# Patient Record
Sex: FEMALE | Race: WHITE | NOT HISPANIC OR LATINO | Employment: UNEMPLOYED | URBAN - NONMETROPOLITAN AREA
[De-identification: names, ages, dates, MRNs, and addresses within clinical notes are randomized per-mention and may not be internally consistent; named-entity substitution may affect disease eponyms.]

---

## 2017-01-25 ENCOUNTER — OFFICE VISIT (OUTPATIENT)
Dept: FAMILY MEDICINE CLINIC | Facility: CLINIC | Age: 52
End: 2017-01-25

## 2017-01-25 VITALS
BODY MASS INDEX: 22.82 KG/M2 | SYSTOLIC BLOOD PRESSURE: 94 MMHG | HEIGHT: 65 IN | DIASTOLIC BLOOD PRESSURE: 64 MMHG | WEIGHT: 137 LBS

## 2017-01-25 DIAGNOSIS — M54.31 SCIATICA OF RIGHT SIDE: ICD-10-CM

## 2017-01-25 DIAGNOSIS — F41.1 GENERALIZED ANXIETY DISORDER: Primary | ICD-10-CM

## 2017-01-25 PROCEDURE — 99213 OFFICE O/P EST LOW 20 MIN: CPT | Performed by: FAMILY MEDICINE

## 2017-01-25 RX ORDER — LORAZEPAM 0.5 MG/1
0.5 TABLET ORAL 2 TIMES DAILY PRN
Qty: 60 TABLET | Refills: 2 | Status: SHIPPED | OUTPATIENT
Start: 2017-01-25 | End: 2017-02-03 | Stop reason: SDUPTHER

## 2017-01-25 NOTE — PATIENT INSTRUCTIONS
Sciatica With Rehab  The sciatic nerve runs from the back down the leg and is responsible for sensation and control of the muscles in the back (posterior) side of the thigh, lower leg, and foot. Sciatica is a condition that is characterized by inflammation of this nerve.   SYMPTOMS   · Signs of nerve damage, including numbness and/or weakness along the posterior side of the lower extremity.  · Pain in the back of the thigh that may also travel down the leg.  · Pain that worsens when sitting for long periods of time.  · Occasionally, pain in the back or buttock.  CAUSES   Inflammation of the sciatic nerve is the cause of sciatica. The inflammation is due to something irritating the nerve. Common sources of irritation include:  · Sitting for long periods of time.  · Direct trauma to the nerve.  · Arthritis of the spine.  · Herniated or ruptured disk.  · Slipping of the vertebrae (spondylolisthesis).  · Pressure from soft tissues, such as muscles or ligament-like tissue (fascia).  RISK INCREASES WITH:  · Sports that place pressure or stress on the spine (football or weightlifting).  · Poor strength and flexibility.  · Failure to warm up properly before activity.  · Family history of low back pain or disk disorders.  · Previous back injury or surgery.  · Poor body mechanics, especially when lifting, or poor posture.  PREVENTION   · Warm up and stretch properly before activity.  · Maintain physical fitness:    Strength, flexibility, and endurance.  · Cardiovascular fitness.  · Learn and use proper technique, especially with posture and lifting. When possible, have  correct improper technique.  · Avoid activities that place stress on the spine.  PROGNOSIS  If treated properly, then sciatica usually resolves within 6 weeks. However, occasionally surgery is necessary.   RELATED COMPLICATIONS   · Permanent nerve damage, including pain, numbness, tingle, or weakness.  · Chronic back pain.  · Risks of surgery: infection,  bleeding, nerve damage, or damage to surrounding tissues.  TREATMENT  Treatment initially involves resting from any activities that aggravate your symptoms. The use of ice and medication may help reduce pain and inflammation. The use of strengthening and stretching exercises may help reduce pain with activity. These exercises may be performed at home or with referral to a therapist. A therapist may recommend further treatments, such as transcutaneous electronic nerve stimulation (TENS) or ultrasound. Your caregiver may recommend corticosteroid injections to help reduce inflammation of the sciatic nerve. If symptoms persist despite non-surgical (conservative) treatment, then surgery may be recommended.  MEDICATION  · If pain medication is necessary, then nonsteroidal anti-inflammatory medications, such as aspirin and ibuprofen, or other minor pain relievers, such as acetaminophen, are often recommended.  · Do not take pain medication for 7 days before surgery.  · Prescription pain relievers may be given if deemed necessary by your caregiver. Use only as directed and only as much as you need.  · Ointments applied to the skin may be helpful.  · Corticosteroid injections may be given by your caregiver. These injections should be reserved for the most serious cases, because they may only be given a certain number of times.  HEAT AND COLD  · Cold treatment (icing) relieves pain and reduces inflammation. Cold treatment should be applied for 10 to 15 minutes every 2 to 3 hours for inflammation and pain and immediately after any activity that aggravates your symptoms. Use ice packs or massage the area with a piece of ice (ice massage).  · Heat treatment may be used prior to performing the stretching and strengthening activities prescribed by your caregiver, physical therapist, or . Use a heat pack or soak the injury in warm water.  SEEK MEDICAL CARE IF:  · Treatment seems to offer no benefit, or the condition  worsens.  · Any medications produce adverse side effects.  EXERCISES   RANGE OF MOTION (ROM) AND STRETCHING EXERCISES - Sciatica  Most people with sciatic will find that their symptoms worsen with either excessive bending forward (flexion) or arching at the low back (extension). The exercises which will help resolve your symptoms will focus on the opposite motion. Your physician, physical therapist or  will help you determine which exercises will be most helpful to resolve your low back pain. Do not complete any exercises without first consulting with your clinician. Discontinue any exercises which worsen your symptoms until you speak to your clinician. If you have pain, numbness or tingling which travels down into your buttocks, leg or foot, the goal of the therapy is for these symptoms to move closer to your back and eventually resolve. Occasionally, these leg symptoms will get better, but your low back pain may worsen; this is typically an indication of progress in your rehabilitation. Be certain to be very alert to any changes in your symptoms and the activities in which you participated in the 24 hours prior to the change. Sharing this information with your clinician will allow him/her to most efficiently treat your condition.  These exercises may help you when beginning to rehabilitate your injury. Your symptoms may resolve with or without further involvement from your physician, physical therapist or . While completing these exercises, remember:   · Restoring tissue flexibility helps normal motion to return to the joints. This allows healthier, less painful movement and activity.  · An effective stretch should be held for at least 30 seconds.  · A stretch should never be painful. You should only feel a gentle lengthening or release in the stretched tissue.  FLEXION RANGE OF MOTION AND STRETCHING EXERCISES:  STRETCH - Flexion, Single Knee to Chest   · Lie on a firm bed or floor  with both legs extended in front of you.  · Keeping one leg in contact with the floor, bring your opposite knee to your chest. Hold your leg in place by either grabbing behind your thigh or at your knee.  · Pull until you feel a gentle stretch in your low back. Hold __________ seconds.  · Slowly release your grasp and repeat the exercise with the opposite side.  Repeat __________ times. Complete this exercise __________ times per day.   STRETCH - Flexion, Double Knee to Chest  · Lie on a firm bed or floor with both legs extended in front of you.  · Keeping one leg in contact with the floor, bring your opposite knee to your chest.  · Tense your stomach muscles to support your back and then lift your other knee to your chest. Hold your legs in place by either grabbing behind your thighs or at your knees.  · Pull both knees toward your chest until you feel a gentle stretch in your low back. Hold __________ seconds.  · Tense your stomach muscles and slowly return one leg at a time to the floor.  Repeat __________ times. Complete this exercise __________ times per day.   STRETCH - Low Trunk Rotation   · Lie on a firm bed or floor. Keeping your legs in front of you, bend your knees so they are both pointed toward the ceiling and your feet are flat on the floor.  · Extend your arms out to the side. This will stabilize your upper body by keeping your shoulders in contact with the floor.  · Gently and slowly drop both knees together to one side until you feel a gentle stretch in your low back. Hold for __________ seconds.  · Tense your stomach muscles to support your low back as you bring your knees back to the starting position. Repeat the exercise to the other side.  Repeat __________ times. Complete this exercise __________ times per day   EXTENSION RANGE OF MOTION AND FLEXIBILITY EXERCISES:  STRETCH - Extension, Prone on Elbows  · Lie on your stomach on the floor, a bed will be too soft. Place your palms about shoulder  "width apart and at the height of your head.  · Place your elbows under your shoulders. If this is too painful, stack pillows under your chest.  · Allow your body to relax so that your hips drop lower and make contact more completely with the floor.  · Hold this position for __________ seconds.  · Slowly return to lying flat on the floor.  Repeat __________ times. Complete this exercise __________ times per day.   RANGE OF MOTION - Extension, Prone Press Ups  · Lie on your stomach on the floor, a bed will be too soft. Place your palms about shoulder width apart and at the height of your head.  · Keeping your back as relaxed as possible, slowly straighten your elbows while keeping your hips on the floor. You may adjust the placement of your hands to maximize your comfort. As you gain motion, your hands will come more underneath your shoulders.  · Hold this position __________ seconds.  · Slowly return to lying flat on the floor.  Repeat __________ times. Complete this exercise __________ times per day.   STRENGTHENING EXERCISES - Sciatica   These exercises may help you when beginning to rehabilitate your injury. These exercises should be done near your \"sweet spot.\" This is the neutral, low-back arch, somewhere between fully rounded and fully arched, that is your least painful position. When performed in this safe range of motion, these exercises can be used for people who have either a flexion or extension based injury. These exercises may resolve your symptoms with or without further involvement from your physician, physical therapist or . While completing these exercises, remember:   · Muscles can gain both the endurance and the strength needed for everyday activities through controlled exercises.  · Complete these exercises as instructed by your physician, physical therapist or . Progress with the resistance and repetition exercises only as your caregiver advises.  · You may " experience muscle soreness or fatigue, but the pain or discomfort you are trying to eliminate should never worsen during these exercises. If this pain does worsen, stop and make certain you are following the directions exactly. If the pain is still present after adjustments, discontinue the exercise until you can discuss the trouble with your clinician.  STRENGTHENING - Deep Abdominals, Pelvic Tilt   · Lie on a firm bed or floor. Keeping your legs in front of you, bend your knees so they are both pointed toward the ceiling and your feet are flat on the floor.  · Tense your lower abdominal muscles to press your low back into the floor. This motion will rotate your pelvis so that your tail bone is scooping upwards rather than pointing at your feet or into the floor.  · With a gentle tension and even breathing, hold this position for __________ seconds.  Repeat __________ times. Complete this exercise __________ times per day.   STRENGTHENING - Abdominals, Crunches   · Lie on a firm bed or floor. Keeping your legs in front of you, bend your knees so they are both pointed toward the ceiling and your feet are flat on the floor. Cross your arms over your chest.  · Slightly tip your chin down without bending your neck.  · Tense your abdominals and slowly lift your trunk high enough to just clear your shoulder blades. Lifting higher can put excessive stress on the low back and does not further strengthen your abdominal muscles.  · Control your return to the starting position.  Repeat __________ times. Complete this exercise __________ times per day.   STRENGTHENING - Quadruped, Opposite UE/LE Lift  · Assume a hands and knees position on a firm surface. Keep your hands under your shoulders and your knees under your hips. You may place padding under your knees for comfort.  · Find your neutral spine and gently tense your abdominal muscles so that you can maintain this position. Your shoulders and hips should form a rectangle  that is parallel with the floor and is not twisted.  · Keeping your trunk steady, lift your right hand no higher than your shoulder and then your left leg no higher than your hip. Make sure you are not holding your breath. Hold this position __________ seconds.  · Continuing to keep your abdominal muscles tense and your back steady, slowly return to your starting position. Repeat with the opposite arm and leg.  Repeat __________ times. Complete this exercise __________ times per day.   STRENGTHENING - Abdominals and Quadriceps, Straight Leg Raise   · Lie on a firm bed or floor with both legs extended in front of you.  · Keeping one leg in contact with the floor, bend the other knee so that your foot can rest flat on the floor.  · Find your neutral spine, and tense your abdominal muscles to maintain your spinal position throughout the exercise.  · Slowly lift your straight leg off the floor about 6 inches for a count of 15, making sure to not hold your breath.  · Still keeping your neutral spine, slowly lower your leg all the way to the floor.  Repeat this exercise with each leg __________ times. Complete this exercise __________ times per day.  POSTURE AND BODY MECHANICS CONSIDERATIONS - Sciatica  Keeping correct posture when sitting, standing or completing your activities will reduce the stress put on different body tissues, allowing injured tissues a chance to heal and limiting painful experiences. The following are general guidelines for improved posture. Your physician or physical therapist will provide you with any instructions specific to your needs. While reading these guidelines, remember:  · The exercises prescribed by your provider will help you have the flexibility and strength to maintain correct postures.  · The correct posture provides the optimal environment for your joints to work. All of your joints have less wear and tear when properly supported by a spine with good posture. This means you will  experience a healthier, less painful body.  · Correct posture must be practiced with all of your activities, especially prolonged sitting and standing. Correct posture is as important when doing repetitive low-stress activities (typing) as it is when doing a single heavy-load activity (lifting).  RESTING POSITIONS  Consider which positions are most painful for you when choosing a resting position. If you have pain with flexion-based activities (sitting, bending, stooping, squatting), choose a position that allows you to rest in a less flexed posture. You would want to avoid curling into a fetal position on your side. If your pain worsens with extension-based activities (prolonged standing, working overhead), avoid resting in an extended position such as sleeping on your stomach. Most people will find more comfort when they rest with their spine in a more neutral position, neither too rounded nor too arched. Lying on a non-sagging bed on your side with a pillow between your knees, or on your back with a pillow under your knees will often provide some relief. Keep in mind, being in any one position for a prolonged period of time, no matter how correct your posture, can still lead to stiffness.  PROPER SITTING POSTURE  In order to minimize stress and discomfort on your spine, you must sit with correct posture Sitting with good posture should be effortless for a healthy body. Returning to good posture is a gradual process. Many people can work toward this most comfortably by using various supports until they have the flexibility and strength to maintain this posture on their own.  When sitting with proper posture, your ears will fall over your shoulders and your shoulders will fall over your hips. You should use the back of the chair to support your upper back. Your low back will be in a neutral position, just slightly arched. You may place a small pillow or folded towel at the base of your low back for support.   When  working at a desk, create an environment that supports good, upright posture. Without extra support, muscles fatigue and lead to excessive strain on joints and other tissues. Keep these recommendations in mind:  CHAIR:   · A chair should be able to slide under your desk when your back makes contact with the back of the chair. This allows you to work closely.  · The chair's height should allow your eyes to be level with the upper part of your monitor and your hands to be slightly lower than your elbows.  BODY POSITION  · Your feet should make contact with the floor. If this is not possible, use a foot rest.  · Keep your ears over your shoulders. This will reduce stress on your neck and low back.  INCORRECT SITTING POSTURES   · If you are feeling tired and unable to assume a healthy sitting posture, do not slouch or slump. This puts excessive strain on your back tissues, causing more damage and pain. Healthier options include:  · Using more support, like a lumbar pillow.  · Switching tasks to something that requires you to be upright or walking.  · Talking a brief walk.  · Lying down to rest in a neutral-spine position.  PROLONGED STANDING WHILE SLIGHTLY LEANING FORWARD   When completing a task that requires you to lean forward while standing in one place for a long time, place either foot up on a stationary 2-4 inch high object to help maintain the best posture. When both feet are on the ground, the low back tends to lose its slight inward curve. If this curve flattens (or becomes too large), then the back and your other joints will experience too much stress, fatigue more quickly and can cause pain.   CORRECT STANDING POSTURES  Proper standing posture should be assumed with all daily activities, even if they only take a few moments, like when brushing your teeth. As in sitting, your ears should fall over your shoulders and your shoulders should fall over your hips. You should keep a slight tension in your abdominal  muscles to brace your spine. Your tailbone should point down to the ground, not behind your body, resulting in an over-extended swayback posture.   INCORRECT STANDING POSTURES   Common incorrect standing postures include a forward head, locked knees and/or an excessive swayback.  WALKING  Walk with an upright posture. Your ears, shoulders and hips should all line-up.  PROLONGED ACTIVITY IN A FLEXED POSITION  When completing a task that requires you to bend forward at your waist or lean over a low surface, try to find a way to stabilize 3 of 4 of your limbs. You can place a hand or elbow on your thigh or rest a knee on the surface you are reaching across. This will provide you more stability so that your muscles do not fatigue as quickly. By keeping your knees relaxed, or slightly bent, you will also reduce stress across your low back.  CORRECT LIFTING TECHNIQUES  DO :   · Assume a wide stance. This will provide you more stability and the opportunity to get as close as possible to the object which you are lifting.  · Tense your abdominals to brace your spine; then bend at the knees and hips. Keeping your back locked in a neutral-spine position, lift using your leg muscles. Lift with your legs, keeping your back straight.  · Test the weight of unknown objects before attempting to lift them.  · Try to keep your elbows locked down at your sides in order get the best strength from your shoulders when carrying an object.  · Always ask for help when lifting heavy or awkward objects.  INCORRECT LIFTING TECHNIQUES  DO NOT:   · Lock your knees when lifting, even if it is a small object.  · Bend and twist. Pivot at your feet or move your feet when needing to change directions.  · Assume that you cannot safely  a paperclip without proper posture.     This information is not intended to replace advice given to you by your health care provider. Make sure you discuss any questions you have with your health care provider.      Document Released: 12/18/2006 Document Revised: 05/03/2016 Document Reviewed: 04/01/2010  [employer] Patient Education ©2016 [employer]

## 2017-01-25 NOTE — MR AVS SNAPSHOT
Nerissa John   1/25/2017 3:15 PM   Office Visit    Dept Phone:  [phone]   Encounter #:  [id number]    Provider:  Raiza Saini MD   Department:  [facility] FAM[facility]                Your Full Care Plan              Today's Medication Changes          These changes are accurate as of: 1/25/17  4:01 PM.  If you have any questions, ask your nurse or doctor.               Medication(s)that have changed:     FORTEO 600 MCG/2.4ML injection   Generic drug:  teriparatide   INJECT 2.4ML SUBQ EVERY DAY AS DIRECTED   What changed:  Another medication with the same name was removed. Continue taking this medication, and follow the directions you see here.   Changed by:  Bari Elizabeth MD            Where to Get Your Medications      You can get these medications from any pharmacy     Bring a paper prescription for each of these medications     LORazepam 0.5 MG tablet                  Your Updated Medication List          This list is accurate as of: 1/25/17  4:01 PM.  Always use your most recent med list.                aspirin 81 MG chewable tablet       calcium carbonate 1250 MG capsule       EASY TOUCH PEN NEEDLES 31G X 5 MM misc   Generic drug:  Insulin Pen Needle   USE AS DIRECTED.       FISH OIL CONCENTRATE 1000 MG capsule       FORTEO 600 MCG/2.4ML injection   Generic drug:  teriparatide   INJECT 2.4ML SUBQ EVERY DAY AS DIRECTED       LORazepam 0.5 MG tablet   Commonly known as:  ATIVAN   Take 1 tablet by mouth 2 (Two) Times a Day As Needed for anxiety.       methIMAzole 5 MG tablet   Commonly known as:  TAPAZOLE   Take 1 tablet by mouth daily.       MULTIVITAMIN PO       naproxen 375 MG tablet   Commonly known as:  NAPROSYN   Take 1 tablet by mouth 2 (Two) Times a Day As Needed for mild pain (1-3).       omeprazole 20 MG capsule   Commonly known as:  priLOSEC   Take 1 capsule by mouth Daily.       tiZANidine 2 MG tablet   Commonly known as:   ZANAFLEX   Take 1 tablet by mouth Every 8 (Eight) Hours As Needed for muscle spasms.       TOPROL XL 25 MG 24 hr tablet   Generic drug:  metoprolol succinate XL       vitamin E 400 UNIT capsule               You Were Diagnosed With        Codes Comments    Generalized anxiety disorder     ICD-10-CM: F41.1  ICD-9-CM: 300.02       Instructions    Sciatica With Rehab  The sciatic nerve runs from the back down the leg and is responsible for sensation and control of the muscles in the back (posterior) side of the thigh, lower leg, and foot. Sciatica is a condition that is characterized by inflammation of this nerve.   SYMPTOMS   · Signs of nerve damage, including numbness and/or weakness along the posterior side of the lower extremity.  · Pain in the back of the thigh that may also travel down the leg.  · Pain that worsens when sitting for long periods of time.  · Occasionally, pain in the back or buttock.  CAUSES   Inflammation of the sciatic nerve is the cause of sciatica. The inflammation is due to something irritating the nerve. Common sources of irritation include:  · Sitting for long periods of time.  · Direct trauma to the nerve.  · Arthritis of the spine.  · Herniated or ruptured disk.  · Slipping of the vertebrae (spondylolisthesis).  · Pressure from soft tissues, such as muscles or ligament-like tissue (fascia).  RISK INCREASES WITH:  · Sports that place pressure or stress on the spine (football or weightlifting).  · Poor strength and flexibility.  · Failure to warm up properly before activity.  · Family history of low back pain or disk disorders.  · Previous back injury or surgery.  · Poor body mechanics, especially when lifting, or poor posture.  PREVENTION   · Warm up and stretch properly before activity.  · Maintain physical fitness:    Strength, flexibility, and endurance.  · Cardiovascular fitness.  · Learn and use proper technique, especially with posture and lifting. When possible, have  correct  improper technique.  · Avoid activities that place stress on the spine.  PROGNOSIS  If treated properly, then sciatica usually resolves within 6 weeks. However, occasionally surgery is necessary.   RELATED COMPLICATIONS   · Permanent nerve damage, including pain, numbness, tingle, or weakness.  · Chronic back pain.  · Risks of surgery: infection, bleeding, nerve damage, or damage to surrounding tissues.  TREATMENT  Treatment initially involves resting from any activities that aggravate your symptoms. The use of ice and medication may help reduce pain and inflammation. The use of strengthening and stretching exercises may help reduce pain with activity. These exercises may be performed at home or with referral to a therapist. A therapist may recommend further treatments, such as transcutaneous electronic nerve stimulation (TENS) or ultrasound. Your caregiver may recommend corticosteroid injections to help reduce inflammation of the sciatic nerve. If symptoms persist despite non-surgical (conservative) treatment, then surgery may be recommended.  MEDICATION  · If pain medication is necessary, then nonsteroidal anti-inflammatory medications, such as aspirin and ibuprofen, or other minor pain relievers, such as acetaminophen, are often recommended.  · Do not take pain medication for 7 days before surgery.  · Prescription pain relievers may be given if deemed necessary by your caregiver. Use only as directed and only as much as you need.  · Ointments applied to the skin may be helpful.  · Corticosteroid injections may be given by your caregiver. These injections should be reserved for the most serious cases, because they may only be given a certain number of times.  HEAT AND COLD  · Cold treatment (icing) relieves pain and reduces inflammation. Cold treatment should be applied for 10 to 15 minutes every 2 to 3 hours for inflammation and pain and immediately after any activity that aggravates your symptoms. Use ice packs  or massage the area with a piece of ice (ice massage).  · Heat treatment may be used prior to performing the stretching and strengthening activities prescribed by your caregiver, physical therapist, or . Use a heat pack or soak the injury in warm water.  SEEK MEDICAL CARE IF:  · Treatment seems to offer no benefit, or the condition worsens.  · Any medications produce adverse side effects.  EXERCISES   RANGE OF MOTION (ROM) AND STRETCHING EXERCISES - Sciatica  Most people with sciatic will find that their symptoms worsen with either excessive bending forward (flexion) or arching at the low back (extension). The exercises which will help resolve your symptoms will focus on the opposite motion. Your physician, physical therapist or  will help you determine which exercises will be most helpful to resolve your low back pain. Do not complete any exercises without first consulting with your clinician. Discontinue any exercises which worsen your symptoms until you speak to your clinician. If you have pain, numbness or tingling which travels down into your buttocks, leg or foot, the goal of the therapy is for these symptoms to move closer to your back and eventually resolve. Occasionally, these leg symptoms will get better, but your low back pain may worsen; this is typically an indication of progress in your rehabilitation. Be certain to be very alert to any changes in your symptoms and the activities in which you participated in the 24 hours prior to the change. Sharing this information with your clinician will allow him/her to most efficiently treat your condition.  These exercises may help you when beginning to rehabilitate your injury. Your symptoms may resolve with or without further involvement from your physician, physical therapist or . While completing these exercises, remember:   · Restoring tissue flexibility helps normal motion to return to the joints. This allows  healthier, less painful movement and activity.  · An effective stretch should be held for at least 30 seconds.  · A stretch should never be painful. You should only feel a gentle lengthening or release in the stretched tissue.  FLEXION RANGE OF MOTION AND STRETCHING EXERCISES:  STRETCH - Flexion, Single Knee to Chest   · Lie on a firm bed or floor with both legs extended in front of you.  · Keeping one leg in contact with the floor, bring your opposite knee to your chest. Hold your leg in place by either grabbing behind your thigh or at your knee.  · Pull until you feel a gentle stretch in your low back. Hold __________ seconds.  · Slowly release your grasp and repeat the exercise with the opposite side.  Repeat __________ times. Complete this exercise __________ times per day.   STRETCH - Flexion, Double Knee to Chest  · Lie on a firm bed or floor with both legs extended in front of you.  · Keeping one leg in contact with the floor, bring your opposite knee to your chest.  · Tense your stomach muscles to support your back and then lift your other knee to your chest. Hold your legs in place by either grabbing behind your thighs or at your knees.  · Pull both knees toward your chest until you feel a gentle stretch in your low back. Hold __________ seconds.  · Tense your stomach muscles and slowly return one leg at a time to the floor.  Repeat __________ times. Complete this exercise __________ times per day.   STRETCH - Low Trunk Rotation   · Lie on a firm bed or floor. Keeping your legs in front of you, bend your knees so they are both pointed toward the ceiling and your feet are flat on the floor.  · Extend your arms out to the side. This will stabilize your upper body by keeping your shoulders in contact with the floor.  · Gently and slowly drop both knees together to one side until you feel a gentle stretch in your low back. Hold for __________ seconds.  · Tense your stomach muscles to support your low back as  "you bring your knees back to the starting position. Repeat the exercise to the other side.  Repeat __________ times. Complete this exercise __________ times per day   EXTENSION RANGE OF MOTION AND FLEXIBILITY EXERCISES:  STRETCH - Extension, Prone on Elbows  · Lie on your stomach on the floor, a bed will be too soft. Place your palms about shoulder width apart and at the height of your head.  · Place your elbows under your shoulders. If this is too painful, stack pillows under your chest.  · Allow your body to relax so that your hips drop lower and make contact more completely with the floor.  · Hold this position for __________ seconds.  · Slowly return to lying flat on the floor.  Repeat __________ times. Complete this exercise __________ times per day.   RANGE OF MOTION - Extension, Prone Press Ups  · Lie on your stomach on the floor, a bed will be too soft. Place your palms about shoulder width apart and at the height of your head.  · Keeping your back as relaxed as possible, slowly straighten your elbows while keeping your hips on the floor. You may adjust the placement of your hands to maximize your comfort. As you gain motion, your hands will come more underneath your shoulders.  · Hold this position __________ seconds.  · Slowly return to lying flat on the floor.  Repeat __________ times. Complete this exercise __________ times per day.   STRENGTHENING EXERCISES - Sciatica   These exercises may help you when beginning to rehabilitate your injury. These exercises should be done near your \"sweet spot.\" This is the neutral, low-back arch, somewhere between fully rounded and fully arched, that is your least painful position. When performed in this safe range of motion, these exercises can be used for people who have either a flexion or extension based injury. These exercises may resolve your symptoms with or without further involvement from your physician, physical therapist or . While completing " these exercises, remember:   · Muscles can gain both the endurance and the strength needed for everyday activities through controlled exercises.  · Complete these exercises as instructed by your physician, physical therapist or . Progress with the resistance and repetition exercises only as your caregiver advises.  · You may experience muscle soreness or fatigue, but the pain or discomfort you are trying to eliminate should never worsen during these exercises. If this pain does worsen, stop and make certain you are following the directions exactly. If the pain is still present after adjustments, discontinue the exercise until you can discuss the trouble with your clinician.  STRENGTHENING - Deep Abdominals, Pelvic Tilt   · Lie on a firm bed or floor. Keeping your legs in front of you, bend your knees so they are both pointed toward the ceiling and your feet are flat on the floor.  · Tense your lower abdominal muscles to press your low back into the floor. This motion will rotate your pelvis so that your tail bone is scooping upwards rather than pointing at your feet or into the floor.  · With a gentle tension and even breathing, hold this position for __________ seconds.  Repeat __________ times. Complete this exercise __________ times per day.   STRENGTHENING - Abdominals, Crunches   · Lie on a firm bed or floor. Keeping your legs in front of you, bend your knees so they are both pointed toward the ceiling and your feet are flat on the floor. Cross your arms over your chest.  · Slightly tip your chin down without bending your neck.  · Tense your abdominals and slowly lift your trunk high enough to just clear your shoulder blades. Lifting higher can put excessive stress on the low back and does not further strengthen your abdominal muscles.  · Control your return to the starting position.  Repeat __________ times. Complete this exercise __________ times per day.   STRENGTHENING - Quadruped, Opposite  UE/LE Lift  · Assume a hands and knees position on a firm surface. Keep your hands under your shoulders and your knees under your hips. You may place padding under your knees for comfort.  · Find your neutral spine and gently tense your abdominal muscles so that you can maintain this position. Your shoulders and hips should form a rectangle that is parallel with the floor and is not twisted.  · Keeping your trunk steady, lift your right hand no higher than your shoulder and then your left leg no higher than your hip. Make sure you are not holding your breath. Hold this position __________ seconds.  · Continuing to keep your abdominal muscles tense and your back steady, slowly return to your starting position. Repeat with the opposite arm and leg.  Repeat __________ times. Complete this exercise __________ times per day.   STRENGTHENING - Abdominals and Quadriceps, Straight Leg Raise   · Lie on a firm bed or floor with both legs extended in front of you.  · Keeping one leg in contact with the floor, bend the other knee so that your foot can rest flat on the floor.  · Find your neutral spine, and tense your abdominal muscles to maintain your spinal position throughout the exercise.  · Slowly lift your straight leg off the floor about 6 inches for a count of 15, making sure to not hold your breath.  · Still keeping your neutral spine, slowly lower your leg all the way to the floor.  Repeat this exercise with each leg __________ times. Complete this exercise __________ times per day.  POSTURE AND BODY MECHANICS CONSIDERATIONS - Sciatica  Keeping correct posture when sitting, standing or completing your activities will reduce the stress put on different body tissues, allowing injured tissues a chance to heal and limiting painful experiences. The following are general guidelines for improved posture. Your physician or physical therapist will provide you with any instructions specific to your needs. While reading these  guidelines, remember:  · The exercises prescribed by your provider will help you have the flexibility and strength to maintain correct postures.  · The correct posture provides the optimal environment for your joints to work. All of your joints have less wear and tear when properly supported by a spine with good posture. This means you will experience a healthier, less painful body.  · Correct posture must be practiced with all of your activities, especially prolonged sitting and standing. Correct posture is as important when doing repetitive low-stress activities (typing) as it is when doing a single heavy-load activity (lifting).  RESTING POSITIONS  Consider which positions are most painful for you when choosing a resting position. If you have pain with flexion-based activities (sitting, bending, stooping, squatting), choose a position that allows you to rest in a less flexed posture. You would want to avoid curling into a fetal position on your side. If your pain worsens with extension-based activities (prolonged standing, working overhead), avoid resting in an extended position such as sleeping on your stomach. Most people will find more comfort when they rest with their spine in a more neutral position, neither too rounded nor too arched. Lying on a non-sagging bed on your side with a pillow between your knees, or on your back with a pillow under your knees will often provide some relief. Keep in mind, being in any one position for a prolonged period of time, no matter how correct your posture, can still lead to stiffness.  PROPER SITTING POSTURE  In order to minimize stress and discomfort on your spine, you must sit with correct posture Sitting with good posture should be effortless for a healthy body. Returning to good posture is a gradual process. Many people can work toward this most comfortably by using various supports until they have the flexibility and strength to maintain this posture on their  own.  When sitting with proper posture, your ears will fall over your shoulders and your shoulders will fall over your hips. You should use the back of the chair to support your upper back. Your low back will be in a neutral position, just slightly arched. You may place a small pillow or folded towel at the base of your low back for support.   When working at a desk, create an environment that supports good, upright posture. Without extra support, muscles fatigue and lead to excessive strain on joints and other tissues. Keep these recommendations in mind:  CHAIR:   · A chair should be able to slide under your desk when your back makes contact with the back of the chair. This allows you to work closely.  · The chair's height should allow your eyes to be level with the upper part of your monitor and your hands to be slightly lower than your elbows.  BODY POSITION  · Your feet should make contact with the floor. If this is not possible, use a foot rest.  · Keep your ears over your shoulders. This will reduce stress on your neck and low back.  INCORRECT SITTING POSTURES   · If you are feeling tired and unable to assume a healthy sitting posture, do not slouch or slump. This puts excessive strain on your back tissues, causing more damage and pain. Healthier options include:  · Using more support, like a lumbar pillow.  · Switching tasks to something that requires you to be upright or walking.  · Talking a brief walk.  · Lying down to rest in a neutral-spine position.  PROLONGED STANDING WHILE SLIGHTLY LEANING FORWARD   When completing a task that requires you to lean forward while standing in one place for a long time, place either foot up on a stationary 2-4 inch high object to help maintain the best posture. When both feet are on the ground, the low back tends to lose its slight inward curve. If this curve flattens (or becomes too large), then the back and your other joints will experience too much stress, fatigue more  quickly and can cause pain.   CORRECT STANDING POSTURES  Proper standing posture should be assumed with all daily activities, even if they only take a few moments, like when brushing your teeth. As in sitting, your ears should fall over your shoulders and your shoulders should fall over your hips. You should keep a slight tension in your abdominal muscles to brace your spine. Your tailbone should point down to the ground, not behind your body, resulting in an over-extended swayback posture.   INCORRECT STANDING POSTURES   Common incorrect standing postures include a forward head, locked knees and/or an excessive swayback.  WALKING  Walk with an upright posture. Your ears, shoulders and hips should all line-up.  PROLONGED ACTIVITY IN A FLEXED POSITION  When completing a task that requires you to bend forward at your waist or lean over a low surface, try to find a way to stabilize 3 of 4 of your limbs. You can place a hand or elbow on your thigh or rest a knee on the surface you are reaching across. This will provide you more stability so that your muscles do not fatigue as quickly. By keeping your knees relaxed, or slightly bent, you will also reduce stress across your low back.  CORRECT LIFTING TECHNIQUES  DO :   · Assume a wide stance. This will provide you more stability and the opportunity to get as close as possible to the object which you are lifting.  · Tense your abdominals to brace your spine; then bend at the knees and hips. Keeping your back locked in a neutral-spine position, lift using your leg muscles. Lift with your legs, keeping your back straight.  · Test the weight of unknown objects before attempting to lift them.  · Try to keep your elbows locked down at your sides in order get the best strength from your shoulders when carrying an object.  · Always ask for help when lifting heavy or awkward objects.  INCORRECT LIFTING TECHNIQUES  DO NOT:   · Lock your knees when lifting, even if it is a small  object.  · Bend and twist. Pivot at your feet or move your feet when needing to change directions.  · Assume that you cannot safely  a paperclip without proper posture.     This information is not intended to replace advice given to you by your health care provider. Make sure you discuss any questions you have with your health care provider.     Document Released: 12/18/2006 Document Revised: 05/03/2016 Document Reviewed: 04/01/2010  [employer] Patient Education ©2016 [employer]         Patient Instructions History      Upcoming Appointments     Visit Type Date Time Department    OFFICE VISIT 1/25/2017  3:15 PM [facility]    OFFICE VISIT 3/23/2017  3:15 PM [facility]    OFFICE VISIT 5/26/2017  3:30 PM [facility]      MyChart Signup     Our records indicate that you have an active [employer] account.    You can view your After Visit Summary by going to [email or web address] and logging in with your [employer] username and password.  If you don't have a [employer] username and password but a parent or guardian has access to your record, the parent or guardian should login with their own [employer] username and password and access your record to view the After Visit Summary.    If you have questions, you can email [email or web address] or call [phone] to talk to our [employer] staff.  Remember, [employer] is NOT to be used for urgent needs.  For medical emergencies, dial 911.               Other Info from Your Visit           Your Appointments     Mar 23, 2017  3:15 PM CDT   Office Visit with Raiza Sanii MD   [facility] FAMILY MEDICINE (--)    [address]  [address] [place] [id number] [phone]           Arrive 15 minutes prior to appointment.            May 26, 2017  3:30 PM CDT   Office Visit with Bari Elizabeth MD   [facility] ENDOCRINOLOGY (--)    [address] [facility] [address]   [phone]           Arrive 15 minutes prior to appointment.              Allergies     No Known Allergies      Reason for Visit     Follow-up recheck for de quervains disease,generalized anxiety disorder    Leg Pain right leg pain, varicose veins       Vital Signs     Blood Pressure Height Weight Last Menstrual Period Body Mass Index Smoking Status    94/64 65\" (165.1 cm) 137 lb (62.1 kg) (LMP Unknown) 22.8 kg/m2 Never Smoker      Problems and Diagnoses Noted     Generalized anxiety disorder        "

## 2017-01-25 NOTE — PROGRESS NOTES
Elizabeth Lopez is a 51 y.o. female.     Leg Pain    There was no injury mechanism. The pain is present in the right hip. The quality of the pain is described as shooting. The pain is at a severity of 4/10. The pain is moderate. The pain has been intermittent since onset. Associated symptoms include numbness and tingling. Pertinent negatives include no inability to bear weight, loss of motion, loss of sensation or muscle weakness. She reports no foreign bodies present. She has tried acetaminophen and NSAIDs for the symptoms. The treatment provided mild relief.   Anxiety   Presents for follow-up visit. Symptoms include excessive worry, muscle tension, nervous/anxious behavior, palpitations, panic and restlessness. Patient reports no chest pain, compulsions, confusion, decreased concentration, depressed mood, dizziness, dry mouth, feeling of choking, hyperventilation, impotence, insomnia, irritability, malaise, nausea, obsessions, shortness of breath or suicidal ideas. Symptoms occur most days. The severity of symptoms is moderate and causing significant distress. The quality of sleep is fair. Nighttime awakenings: occasional.     Compliance with medications is %.         Current Outpatient Prescriptions:   •  aspirin 81 MG chewable tablet, Chew 81 mg daily., Disp: , Rfl:   •  calcium carbonate 1250 MG capsule, Take 600 mg by mouth., Disp: , Rfl:   •  EASY TOUCH PEN NEEDLES 31G X 5 MM misc, USE AS DIRECTED., Disp: 50 each, Rfl: 11  •  FORTEO 600 MCG/2.4ML injection, INJECT 2.4ML SUBQ EVERY DAY AS DIRECTED, Disp: 2.4 mL, Rfl: 3  •  LORazepam (ATIVAN) 0.5 MG tablet, Take 1 tablet by mouth 2 (Two) Times a Day As Needed for anxiety., Disp: 60 tablet, Rfl: 2  •  methimazole (TAPAZOLE) 5 MG tablet, Take 1 tablet by mouth daily., Disp: 30 tablet, Rfl: 4  •  metoprolol succinate XL (TOPROL XL) 25 MG 24 hr tablet, Take 12.5 mg by mouth daily., Disp: , Rfl:   •  Multiple Vitamins-Minerals (MULTIVITAMIN PO),  "Take 1 tablet by mouth daily., Disp: , Rfl:   •  naproxen (NAPROSYN) 375 MG tablet, Take 1 tablet by mouth 2 (Two) Times a Day As Needed for mild pain (1-3)., Disp: 30 tablet, Rfl: 1  •  Omega-3 Fatty Acids (FISH OIL CONCENTRATE) 1000 MG capsule, Take 1 capsule by mouth 2 (two) times a day., Disp: , Rfl:   •  omeprazole (PriLOSEC) 20 MG capsule, Take 1 capsule by mouth Daily., Disp: 30 capsule, Rfl: 5  •  tiZANidine (ZANAFLEX) 2 MG tablet, Take 1 tablet by mouth Every 8 (Eight) Hours As Needed for muscle spasms., Disp: 30 tablet, Rfl: 2  •  vitamin E 400 UNIT capsule, Take 400 Units by mouth daily., Disp: , Rfl:     The following portions of the patient's history were reviewed and updated as appropriate: allergies, current medications, past family history, past medical history, past social history, past surgical history and problem list.    Review of Systems   Constitutional: Negative for irritability.   Respiratory: Negative for shortness of breath.    Cardiovascular: Positive for palpitations. Negative for chest pain and leg swelling.   Gastrointestinal: Negative for abdominal distention, abdominal pain, constipation, diarrhea, nausea and vomiting.   Genitourinary: Negative for impotence.   Musculoskeletal: Positive for arthralgias, back pain and gait problem. Negative for joint swelling, myalgias, neck pain and neck stiffness.   Skin: Negative for pallor, rash and wound.   Neurological: Positive for tingling and numbness. Negative for dizziness.   Psychiatric/Behavioral: Negative for confusion, decreased concentration and suicidal ideas. The patient is nervous/anxious. The patient does not have insomnia.      Objective    Vitals:    01/25/17 1504   BP: 94/64   Weight: 137 lb (62.1 kg)   Height: 65\" (165.1 cm)       Physical Exam   Constitutional: She is oriented to person, place, and time. She appears well-developed and well-nourished. No distress.   Cardiovascular: Normal rate, regular rhythm and normal heart " sounds.    No murmur heard.  Pulmonary/Chest: Effort normal and breath sounds normal. No respiratory distress. She has no wheezes.   Abdominal: Soft. Bowel sounds are normal. She exhibits no distension. There is no tenderness.   Musculoskeletal:        Right hip: She exhibits normal range of motion, normal strength, no bony tenderness, no swelling and no deformity.        Lumbar back: She exhibits normal range of motion, no tenderness, no pain and no spasm.   Negative straight leg test BL.  Also normal strength in legs BL.   Neurological: She is alert and oriented to person, place, and time.   Psychiatric: She has a normal mood and affect. Her behavior is normal. Judgment and thought content normal.   Nursing note and vitals reviewed.      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  Will continue ativan for now.  UDS at next appointment.  The patient has read and signed the [facility] Controlled Substance Contract.  I will continue to see patient for regular follow up appointments.  They are well controlled on their medication.  BRENDAN has been reviewed by me and is updated every 3 months. The patient is aware of the potential for addiction and dependence.  2.) Sciatica-  Will try stretches.  Isn't flaired at the moment.    RTC in 2 months or sooner PRN

## 2017-02-01 RX ORDER — METHIMAZOLE 5 MG/1
TABLET ORAL
Qty: 30 TABLET | Refills: 0 | Status: SHIPPED | OUTPATIENT
Start: 2017-02-01 | End: 2017-05-26 | Stop reason: SDUPTHER

## 2017-02-03 DIAGNOSIS — F41.1 GENERALIZED ANXIETY DISORDER: ICD-10-CM

## 2017-02-03 RX ORDER — LORAZEPAM 0.5 MG/1
0.5 TABLET ORAL 2 TIMES DAILY PRN
Qty: 60 TABLET | Refills: 2 | Status: SHIPPED | OUTPATIENT
Start: 2017-02-03 | End: 2017-05-05 | Stop reason: SDUPTHER

## 2017-02-22 ENCOUNTER — HOSPITAL ENCOUNTER (EMERGENCY)
Facility: HOSPITAL | Age: 52
Discharge: HOME | End: 2017-02-22
Attending: EMERGENCY MEDICINE | Admitting: NURSE PRACTITIONER

## 2017-02-22 ENCOUNTER — APPOINTMENT (OUTPATIENT)
Dept: GENERAL RADIOLOGY | Facility: HOSPITAL | Age: 52
End: 2017-02-22

## 2017-02-22 VITALS
HEART RATE: 80 BPM | BODY MASS INDEX: 22.16 KG/M2 | SYSTOLIC BLOOD PRESSURE: 115 MMHG | WEIGHT: 133 LBS | TEMPERATURE: 99.1 F | OXYGEN SATURATION: 97 % | HEIGHT: 65 IN | DIASTOLIC BLOOD PRESSURE: 67 MMHG | RESPIRATION RATE: 18 BRPM

## 2017-02-22 DIAGNOSIS — F41.9 ANXIETY: Primary | ICD-10-CM

## 2017-02-22 LAB
ALBUMIN SERPL-MCNC: 4.1 G/DL (ref 3.4–4.8)
ALBUMIN/GLOB SERPL: 1.2 G/DL (ref 1.1–1.8)
ALP SERPL-CCNC: 58 U/L (ref 38–126)
ALT SERPL W P-5'-P-CCNC: 26 U/L (ref 9–52)
ANION GAP SERPL CALCULATED.3IONS-SCNC: 11 MMOL/L (ref 5–15)
AST SERPL-CCNC: 32 U/L (ref 14–36)
BASOPHILS # BLD AUTO: 0.05 10*3/MM3 (ref 0–0.2)
BASOPHILS NFR BLD AUTO: 0.8 % (ref 0–2)
BILIRUB SERPL-MCNC: 0.4 MG/DL (ref 0.2–1.3)
BUN BLD-MCNC: 15 MG/DL (ref 7–21)
BUN/CREAT SERPL: 25.9 (ref 7–25)
CALCIUM SPEC-SCNC: 9.1 MG/DL (ref 8.4–10.2)
CHLORIDE SERPL-SCNC: 98 MMOL/L (ref 95–110)
CO2 SERPL-SCNC: 25 MMOL/L (ref 22–31)
CREAT BLD-MCNC: 0.58 MG/DL (ref 0.5–1)
DEPRECATED RDW RBC AUTO: 38 FL (ref 36.4–46.3)
EOSINOPHIL # BLD AUTO: 0.15 10*3/MM3 (ref 0–0.7)
EOSINOPHIL NFR BLD AUTO: 2.4 % (ref 0–7)
ERYTHROCYTE [DISTWIDTH] IN BLOOD BY AUTOMATED COUNT: 11.9 % (ref 11.5–14.5)
GFR SERPL CREATININE-BSD FRML MDRD: 110 ML/MIN/1.73 (ref 51–120)
GLOBULIN UR ELPH-MCNC: 3.3 GM/DL (ref 2.3–3.5)
GLUCOSE BLD-MCNC: 116 MG/DL (ref 60–100)
HCT VFR BLD AUTO: 36.6 % (ref 35–45)
HGB BLD-MCNC: 13.1 G/DL (ref 12–15.5)
HOLD SPECIMEN: NORMAL
HOLD SPECIMEN: NORMAL
IMM GRANULOCYTES # BLD: 0.01 10*3/MM3 (ref 0–0.02)
IMM GRANULOCYTES NFR BLD: 0.2 % (ref 0–0.5)
LYMPHOCYTES # BLD AUTO: 1.88 10*3/MM3 (ref 0.6–4.2)
LYMPHOCYTES NFR BLD AUTO: 30.1 % (ref 10–50)
MCH RBC QN AUTO: 31.4 PG (ref 26.5–34)
MCHC RBC AUTO-ENTMCNC: 35.8 G/DL (ref 31.4–36)
MCV RBC AUTO: 87.8 FL (ref 80–98)
MONOCYTES # BLD AUTO: 0.34 10*3/MM3 (ref 0–0.9)
MONOCYTES NFR BLD AUTO: 5.4 % (ref 0–12)
NEUTROPHILS # BLD AUTO: 3.82 10*3/MM3 (ref 2–8.6)
NEUTROPHILS NFR BLD AUTO: 61.1 % (ref 37–80)
NT-PROBNP SERPL-MCNC: 106 PG/ML (ref 0–900)
PLATELET # BLD AUTO: 279 10*3/MM3 (ref 150–450)
PMV BLD AUTO: 9.2 FL (ref 8–12)
POTASSIUM BLD-SCNC: 3.8 MMOL/L (ref 3.5–5.1)
PROT SERPL-MCNC: 7.4 G/DL (ref 6.3–8.6)
RBC # BLD AUTO: 4.17 10*6/MM3 (ref 3.77–5.16)
SODIUM BLD-SCNC: 134 MMOL/L (ref 137–145)
T4 FREE SERPL-MCNC: 1 NG/DL (ref 0.78–2.19)
TROPONIN I SERPL-MCNC: <0.012 NG/ML
TROPONIN I SERPL-MCNC: <0.012 NG/ML
TSH SERPL DL<=0.05 MIU/L-ACNC: 1.58 MIU/ML (ref 0.46–4.68)
WBC NRBC COR # BLD: 6.25 10*3/MM3 (ref 3.2–9.8)
WHOLE BLOOD HOLD SPECIMEN: NORMAL
WHOLE BLOOD HOLD SPECIMEN: NORMAL

## 2017-02-22 PROCEDURE — 80050 GENERAL HEALTH PANEL: CPT | Performed by: EMERGENCY MEDICINE

## 2017-02-22 PROCEDURE — 83880 ASSAY OF NATRIURETIC PEPTIDE: CPT | Performed by: EMERGENCY MEDICINE

## 2017-02-22 PROCEDURE — 71010 HC CHEST PA OR AP: CPT

## 2017-02-22 PROCEDURE — 93005 ELECTROCARDIOGRAM TRACING: CPT

## 2017-02-22 PROCEDURE — 84439 ASSAY OF FREE THYROXINE: CPT | Performed by: NURSE PRACTITIONER

## 2017-02-22 PROCEDURE — 93010 ELECTROCARDIOGRAM REPORT: CPT | Performed by: INTERNAL MEDICINE

## 2017-02-22 PROCEDURE — 36415 COLL VENOUS BLD VENIPUNCTURE: CPT

## 2017-02-22 PROCEDURE — 84484 ASSAY OF TROPONIN QUANT: CPT | Performed by: EMERGENCY MEDICINE

## 2017-02-22 PROCEDURE — 99284 EMERGENCY DEPT VISIT MOD MDM: CPT

## 2017-02-22 RX ORDER — SODIUM CHLORIDE 0.9 % (FLUSH) 0.9 %
10 SYRINGE (ML) INJECTION AS NEEDED
Status: DISCONTINUED | OUTPATIENT
Start: 2017-02-22 | End: 2017-02-22 | Stop reason: HOSPADM

## 2017-02-22 RX ORDER — ASPIRIN 325 MG
325 TABLET ORAL ONCE
Status: DISCONTINUED | OUTPATIENT
Start: 2017-02-22 | End: 2017-02-22 | Stop reason: HOSPADM

## 2017-02-22 NOTE — ED PROVIDER NOTES
Subjective   HPI Comments: States she was sitting in her recliner, eating chocolate bar, felt she almost choked on almond. She has panic attacks and had a bad panic attack. She checked her oxygen level with pulse ox at home and it was reading low, scared her even more, so she called ambulance. Denies feeling sob at this time. She did take ativan.  Had heart cath last year, states it was clear.      History provided by:  Patient      Review of Systems   Constitutional: Negative.    Eyes: Negative.    Respiratory: Negative.    Cardiovascular: Negative.    Gastrointestinal: Negative.    Genitourinary: Negative.    Musculoskeletal: Negative.    Skin: Negative.    Neurological: Negative.    Psychiatric/Behavioral: Negative.        Past Medical History   Diagnosis Date   • Anal fistula    • Anemia    • Astigmatism    • Death of family member      Mother passes away.   • Generalized anxiety disorder    • GERD (gastroesophageal reflux disease)    • Goiter    • Graves disease    • Heartburn    • Hematuria    • Hyperthyroidism    • Lesion of eyelid      LLL   • Lumbosacral spondylosis    • Menopausal syndrome    • Myopia    • Osteoporosis    • Pain in back      Lumbar region   • Pain in joint involving left lower leg    • Pain in wrist    • Panic attack    • Presbyopia    • Right upper quadrant pain    • Takotsubo cardiomyopathy    • Thoracic spondylosis        No Known Allergies    Past Surgical History   Procedure Laterality Date   • Skin biopsy  06/29/2014   • Cardiac catheterization  03/02/2016     Normal coronaries with no obstructive disease. Nonischemic stress induced cardiomyopathy known as Takotsubo syndrome.   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   • Wound closure  01/16/2014     Layer closure of wound   • Colonoscopy  01/30/2016     Removal of anal fistula   • Hysterectomy  04/14/2014     Total abdominal hysterectomy, bilateral salpingo-oophorectomy.  Menorrhagia and leiomyomatous uterus.   • Tubal abdominal ligation         Family History   Problem Relation Age of Onset   • Heart disease Mother    • Cancer Father    • Diabetes Sister    • Thyroid disease Sister    • Diabetes Brother    • Cancer Maternal Grandmother      Ovarian Cancer   • Breast cancer Other    • Cancer Other    • Other Other      Gall Bladder disease   • Breast cancer Other        Social History     Social History   • Marital status:      Spouse name: N/A   • Number of children: N/A   • Years of education: N/A     Social History Main Topics   • Smoking status: Never Smoker   • Smokeless tobacco: None   • Alcohol use No   • Drug use: None   • Sexual activity: Not Asked     Other Topics Concern   • None     Social History Narrative           Objective   Physical Exam    ECG 12 Lead    Date/Time: 2/22/2017 2:34 PM  Performed by: JOJO ESTEVEZ  Authorized by: YANIQUE MENDEZ   Interpreted by physician  Comparison: not compared with previous ECG   Rhythm: sinus rhythm  BPM: 92                 ED Course  ED Course      XR Chest 1 View   Final Result   CONCLUSION: No acute cardiopulmonary pathology is identified.      Electronically signed by:  Malick Moura MD  2/22/2017 3:03   PM CST Workstation: [employer]        Results for orders placed or performed during the hospital encounter of 02/22/17   Troponin   Result Value Ref Range    Troponin I <0.012 <=0.034 ng/mL   Troponin   Result Value Ref Range    Troponin I <0.012 <=0.034 ng/mL   Comprehensive Metabolic Panel   Result Value Ref Range    Glucose 116 (H) 60 - 100 mg/dL    BUN 15 7 - 21 mg/dL    Creatinine 0.58 0.50 - 1.00 mg/dL    Sodium 134 (L) 137 - 145 mmol/L    Potassium 3.8 3.5 - 5.1 mmol/L    Chloride 98 95 - 110 mmol/L    CO2 25.0 22.0 - 31.0 mmol/L    Calcium 9.1 8.4 - 10.2 mg/dL    Total Protein 7.4 6.3 - 8.6 g/dL    Albumin 4.10 3.40 - 4.80 g/dL    ALT (SGPT) 26 9 - 52 U/L    AST (SGOT) 32 14 - 36 U/L    Alkaline Phosphatase  58 38 - 126 U/L    Total Bilirubin 0.4 0.2 - 1.3 mg/dL    eGFR Non  Amer 110 51 - 120 mL/min/1.73    Globulin 3.3 2.3 - 3.5 gm/dL    A/G Ratio 1.2 1.1 - 1.8 g/dL    BUN/Creatinine Ratio 25.9 (H) 7.0 - 25.0    Anion Gap 11.0 5.0 - 15.0 mmol/L   BNP   Result Value Ref Range    proBNP 106.0 0.0 - 900.0 pg/mL   CBC Auto Differential   Result Value Ref Range    WBC 6.25 3.20 - 9.80 10*3/mm3    RBC 4.17 3.77 - 5.16 10*6/mm3    Hemoglobin 13.1 12.0 - 15.5 g/dL    Hematocrit 36.6 35.0 - 45.0 %    MCV 87.8 80.0 - 98.0 fL    MCH 31.4 26.5 - 34.0 pg    MCHC 35.8 31.4 - 36.0 g/dL    RDW 11.9 11.5 - 14.5 %    RDW-SD 38.0 36.4 - 46.3 fl    MPV 9.2 8.0 - 12.0 fL    Platelets 279 150 - 450 10*3/mm3    Neutrophil % 61.1 37.0 - 80.0 %    Lymphocyte % 30.1 10.0 - 50.0 %    Monocyte % 5.4 0.0 - 12.0 %    Eosinophil % 2.4 0.0 - 7.0 %    Basophil % 0.8 0.0 - 2.0 %    Immature Grans % 0.2 0.0 - 0.5 %    Neutrophils, Absolute 3.82 2.00 - 8.60 10*3/mm3    Lymphocytes, Absolute 1.88 0.60 - 4.20 10*3/mm3    Monocytes, Absolute 0.34 0.00 - 0.90 10*3/mm3    Eosinophils, Absolute 0.15 0.00 - 0.70 10*3/mm3    Basophils, Absolute 0.05 0.00 - 0.20 10*3/mm3    Immature Grans, Absolute 0.01 0.00 - 0.02 10*3/mm3   TSH   Result Value Ref Range    TSH 1.580 0.460 - 4.680 mIU/mL   T4, Free   Result Value Ref Range    Free T4 1.00 0.78 - 2.19 ng/dL   Light Blue Top   Result Value Ref Range    Extra Tube hold for add-on    Green Top (Gel)   Result Value Ref Range    Extra Tube Hold for add-ons.    Lavender Top   Result Value Ref Range    Extra Tube hold for add-on    Gold Top - SST   Result Value Ref Range    Extra Tube Hold for add-ons.                    MDM  Number of Diagnoses or Management Options  Anxiety:   Diagnosis management comments: Labs negative, CXR neg. She has ativan at home, will use as needed and follow up with Dr Saini.      Final diagnoses:   Anxiety            Rachel Dobbs, APRN  02/22/17 2053

## 2017-04-19 RX ORDER — OMEPRAZOLE 20 MG/1
20 CAPSULE, DELAYED RELEASE ORAL DAILY
Qty: 30 CAPSULE | Refills: 5 | Status: SHIPPED | OUTPATIENT
Start: 2017-04-19 | End: 2017-09-21 | Stop reason: SDUPTHER

## 2017-05-05 ENCOUNTER — OFFICE VISIT (OUTPATIENT)
Dept: FAMILY MEDICINE CLINIC | Facility: CLINIC | Age: 52
End: 2017-05-05

## 2017-05-05 VITALS
HEIGHT: 65 IN | WEIGHT: 138 LBS | DIASTOLIC BLOOD PRESSURE: 78 MMHG | BODY MASS INDEX: 22.99 KG/M2 | SYSTOLIC BLOOD PRESSURE: 118 MMHG

## 2017-05-05 DIAGNOSIS — M54.32 SCIATICA OF LEFT SIDE: Primary | ICD-10-CM

## 2017-05-05 DIAGNOSIS — Z12.31 ENCOUNTER FOR SCREENING MAMMOGRAM FOR BREAST CANCER: ICD-10-CM

## 2017-05-05 DIAGNOSIS — F41.1 GENERALIZED ANXIETY DISORDER: ICD-10-CM

## 2017-05-05 PROCEDURE — 96372 THER/PROPH/DIAG INJ SC/IM: CPT | Performed by: FAMILY MEDICINE

## 2017-05-05 PROCEDURE — 99214 OFFICE O/P EST MOD 30 MIN: CPT | Performed by: FAMILY MEDICINE

## 2017-05-05 RX ORDER — TRIAMCINOLONE ACETONIDE 40 MG/ML
80 INJECTION, SUSPENSION INTRA-ARTICULAR; INTRAMUSCULAR ONCE
Status: COMPLETED | OUTPATIENT
Start: 2017-05-05 | End: 2017-05-05

## 2017-05-05 RX ORDER — HYDROCODONE BITARTRATE AND ACETAMINOPHEN 5; 325 MG/1; MG/1
1 TABLET ORAL EVERY 6 HOURS PRN
Qty: 40 TABLET | Refills: 0 | Status: SHIPPED | OUTPATIENT
Start: 2017-05-05 | End: 2017-06-01 | Stop reason: SDUPTHER

## 2017-05-05 RX ORDER — LORAZEPAM 0.5 MG/1
0.5 TABLET ORAL 2 TIMES DAILY PRN
Qty: 60 TABLET | Refills: 2 | Status: SHIPPED | OUTPATIENT
Start: 2017-05-05 | End: 2017-06-30 | Stop reason: SDUPTHER

## 2017-05-05 RX ADMIN — TRIAMCINOLONE ACETONIDE 80 MG: 40 INJECTION, SUSPENSION INTRA-ARTICULAR; INTRAMUSCULAR at 11:44

## 2017-05-08 ENCOUNTER — TELEPHONE (OUTPATIENT)
Dept: FAMILY MEDICINE CLINIC | Facility: CLINIC | Age: 52
End: 2017-05-08

## 2017-05-11 ENCOUNTER — TELEPHONE (OUTPATIENT)
Dept: FAMILY MEDICINE CLINIC | Facility: CLINIC | Age: 52
End: 2017-05-11

## 2017-05-11 RX ORDER — ESCITALOPRAM OXALATE 10 MG/1
10 TABLET ORAL DAILY
Qty: 30 TABLET | Refills: 2 | Status: SHIPPED | OUTPATIENT
Start: 2017-05-11 | End: 2017-07-27 | Stop reason: DRUGHIGH

## 2017-05-18 ENCOUNTER — APPOINTMENT (OUTPATIENT)
Dept: LAB | Facility: HOSPITAL | Age: 52
End: 2017-05-18

## 2017-05-18 PROCEDURE — 80061 LIPID PANEL: CPT | Performed by: INTERNAL MEDICINE

## 2017-05-18 PROCEDURE — 80050 GENERAL HEALTH PANEL: CPT | Performed by: INTERNAL MEDICINE

## 2017-05-18 PROCEDURE — 36415 COLL VENOUS BLD VENIPUNCTURE: CPT | Performed by: INTERNAL MEDICINE

## 2017-05-18 PROCEDURE — 82607 VITAMIN B-12: CPT | Performed by: INTERNAL MEDICINE

## 2017-05-18 PROCEDURE — 82306 VITAMIN D 25 HYDROXY: CPT | Performed by: INTERNAL MEDICINE

## 2017-05-23 ENCOUNTER — HOSPITAL ENCOUNTER (OUTPATIENT)
Dept: PHYSICAL THERAPY | Facility: HOSPITAL | Age: 52
Setting detail: THERAPIES SERIES
Discharge: HOME | End: 2017-05-23

## 2017-05-23 DIAGNOSIS — M54.32 SCIATICA OF LEFT SIDE: Primary | ICD-10-CM

## 2017-05-23 PROCEDURE — 87109 MYCOPLASMA: CPT | Performed by: NURSE PRACTITIONER

## 2017-05-23 PROCEDURE — 87086 URINE CULTURE/COLONY COUNT: CPT | Performed by: NURSE PRACTITIONER

## 2017-05-23 PROCEDURE — 97162 PT EVAL MOD COMPLEX 30 MIN: CPT

## 2017-05-23 PROCEDURE — 97110 THERAPEUTIC EXERCISES: CPT

## 2017-05-26 ENCOUNTER — OFFICE VISIT (OUTPATIENT)
Dept: ENDOCRINOLOGY | Facility: CLINIC | Age: 52
End: 2017-05-26

## 2017-05-26 VITALS
HEART RATE: 77 BPM | WEIGHT: 138.4 LBS | DIASTOLIC BLOOD PRESSURE: 70 MMHG | BODY MASS INDEX: 23.06 KG/M2 | SYSTOLIC BLOOD PRESSURE: 116 MMHG | HEIGHT: 65 IN

## 2017-05-26 DIAGNOSIS — M81.0 OSTEOPOROSIS: ICD-10-CM

## 2017-05-26 DIAGNOSIS — E05.90 HYPERTHYROIDISM: Primary | ICD-10-CM

## 2017-05-26 DIAGNOSIS — E55.9 VITAMIN D DEFICIENCY: ICD-10-CM

## 2017-05-26 PROCEDURE — 99214 OFFICE O/P EST MOD 30 MIN: CPT | Performed by: INTERNAL MEDICINE

## 2017-05-26 RX ORDER — METHIMAZOLE 5 MG/1
TABLET ORAL
Qty: 30 TABLET | Refills: 11 | Status: SHIPPED | OUTPATIENT
Start: 2017-05-26 | End: 2018-05-18 | Stop reason: SDUPTHER

## 2017-06-01 ENCOUNTER — HOSPITAL ENCOUNTER (OUTPATIENT)
Dept: PHYSICAL THERAPY | Facility: HOSPITAL | Age: 52
Setting detail: THERAPIES SERIES
Discharge: HOME | End: 2017-06-01

## 2017-06-01 ENCOUNTER — OFFICE VISIT (OUTPATIENT)
Dept: FAMILY MEDICINE CLINIC | Facility: CLINIC | Age: 52
End: 2017-06-01

## 2017-06-01 VITALS
SYSTOLIC BLOOD PRESSURE: 102 MMHG | BODY MASS INDEX: 22.76 KG/M2 | DIASTOLIC BLOOD PRESSURE: 68 MMHG | HEIGHT: 65 IN | WEIGHT: 136.6 LBS

## 2017-06-01 DIAGNOSIS — M54.32 SCIATICA OF LEFT SIDE: Primary | ICD-10-CM

## 2017-06-01 DIAGNOSIS — M54.40 BILATERAL LOW BACK PAIN WITH SCIATICA, SCIATICA LATERALITY UNSPECIFIED, UNSPECIFIED CHRONICITY: Primary | Chronic | ICD-10-CM

## 2017-06-01 DIAGNOSIS — F41.1 GENERALIZED ANXIETY DISORDER: Chronic | ICD-10-CM

## 2017-06-01 PROCEDURE — 97110 THERAPEUTIC EXERCISES: CPT

## 2017-06-01 PROCEDURE — 99213 OFFICE O/P EST LOW 20 MIN: CPT | Performed by: FAMILY MEDICINE

## 2017-06-01 PROCEDURE — G0283 ELEC STIM OTHER THAN WOUND: HCPCS

## 2017-06-01 RX ORDER — HYDROCODONE BITARTRATE AND ACETAMINOPHEN 5; 325 MG/1; MG/1
1 TABLET ORAL EVERY 6 HOURS PRN
Qty: 40 TABLET | Refills: 0 | Status: SHIPPED | OUTPATIENT
Start: 2017-06-01 | End: 2017-06-30 | Stop reason: SDUPTHER

## 2017-06-01 NOTE — PROGRESS NOTES
Elizabeth Lopez is a 51 y.o. female.     Back Pain   This is a recurrent problem. The current episode started more than 1 month ago. The problem occurs constantly. Associated symptoms include headaches. Pertinent negatives include no abdominal pain or chest pain. She has tried analgesics, NSAIDs and muscle relaxant (She has just started PT) for the symptoms.   Anxiety   Presents for follow-up visit. Symptoms include excessive worry and nervous/anxious behavior. Patient reports no chest pain, compulsions, confusion, decreased concentration, depressed mood, dizziness, dry mouth, feeling of choking, hyperventilation, impotence, insomnia, irritability, malaise, muscle tension, nausea, obsessions, palpitations, panic, restlessness or shortness of breath. Symptoms occur occasionally. The severity of symptoms is moderate. The quality of sleep is fair. Nighttime awakenings: occasional.     Compliance with medications is %. Treatment side effects: She has been doing better on lexapro.        Current Outpatient Prescriptions:   •  aspirin 81 MG chewable tablet, Chew 81 mg daily., Disp: , Rfl:   •  calcium carbonate 1250 MG capsule, Take 600 mg by mouth., Disp: , Rfl:   •  EASY TOUCH PEN NEEDLES 31G X 5 MM misc, USE AS DIRECTED., Disp: 50 each, Rfl: 11  •  escitalopram (LEXAPRO) 10 MG tablet, Take 1 tablet by mouth Daily., Disp: 30 tablet, Rfl: 2  •  FORTEO 600 MCG/2.4ML injection, INJECT 2.4ML SUBQ EVERY DAY AS DIRECTED, Disp: 2.4 mL, Rfl: 3  •  HYDROcodone-acetaminophen (NORCO) 5-325 MG per tablet, Take 1 tablet by mouth Every 6 (Six) Hours As Needed for Moderate Pain (4-6)., Disp: 40 tablet, Rfl: 0  •  LORazepam (ATIVAN) 0.5 MG tablet, Take 1 tablet by mouth 2 (Two) Times a Day As Needed for Anxiety., Disp: 60 tablet, Rfl: 2  •  methIMAzole (TAPAZOLE) 5 MG tablet, One tab po daily, Disp: 30 tablet, Rfl: 11  •  metoprolol succinate XL (TOPROL XL) 25 MG 24 hr tablet, Take 12.5 mg by mouth daily., Disp: , Rfl:  "  •  Multiple Vitamins-Minerals (MULTIVITAMIN PO), Take 1 tablet by mouth daily., Disp: , Rfl:   •  naproxen (NAPROSYN) 375 MG tablet, Take 1 tablet by mouth 2 (Two) Times a Day As Needed for mild pain (1-3)., Disp: 30 tablet, Rfl: 1  •  Omega-3 Fatty Acids (FISH OIL CONCENTRATE) 1000 MG capsule, Take 1 capsule by mouth 2 (two) times a day., Disp: , Rfl:   •  omeprazole (priLOSEC) 20 MG capsule, Take 1 capsule by mouth Daily., Disp: 30 capsule, Rfl: 5  •  tiZANidine (ZANAFLEX) 2 MG tablet, Take 1 tablet by mouth Every 8 (Eight) Hours As Needed for muscle spasms., Disp: 30 tablet, Rfl: 2  •  vitamin E 400 UNIT capsule, Take 400 Units by mouth daily., Disp: , Rfl:     The following portions of the patient's history were reviewed and updated as appropriate: allergies, current medications, past family history, past medical history, past social history, past surgical history and problem list.    Review of Systems   Constitutional: Positive for activity change, appetite change and fatigue. Negative for irritability and unexpected weight change.   Eyes: Negative for visual disturbance.   Respiratory: Positive for chest tightness. Negative for cough, shortness of breath and wheezing.    Cardiovascular: Negative for chest pain, palpitations and leg swelling.   Gastrointestinal: Negative for abdominal distention, abdominal pain, constipation, diarrhea, nausea and vomiting.   Genitourinary: Negative for impotence.   Musculoskeletal: Positive for arthralgias, back pain and gait problem.   Neurological: Positive for headaches. Negative for dizziness.   Psychiatric/Behavioral: Positive for agitation, dysphoric mood and sleep disturbance. Negative for confusion and decreased concentration. The patient is nervous/anxious. The patient does not have insomnia.        Objective    Vitals:    06/01/17 0843   BP: 102/68   Weight: 136 lb 9.6 oz (62 kg)   Height: 65\" (165.1 cm)       Physical Exam   Constitutional: She is oriented to " person, place, and time. She appears well-developed and well-nourished. No distress.   Cardiovascular: Normal rate, regular rhythm and normal heart sounds.    No murmur heard.  No LE edema.   Pulmonary/Chest: Effort normal and breath sounds normal. No respiratory distress.   Abdominal: Soft. Bowel sounds are normal. She exhibits no distension. There is no tenderness.   Musculoskeletal:        Cervical back: She exhibits tenderness and spasm. She exhibits normal range of motion, no bony tenderness, no deformity and no laceration.        Lumbar back: She exhibits decreased range of motion, tenderness, pain and spasm. She exhibits no deformity.   Neurological: She is alert and oriented to person, place, and time.   Psychiatric: She has a normal mood and affect. Her behavior is normal. Judgment and thought content normal.   Nursing note and vitals reviewed.      Assessment/Plan   Problems Addressed this Visit        Nervous and Auditory    Lumbar back pain - Primary       Other    Generalized anxiety disorder        1.) Back Pain-  Will continue her PT and has been getting better. Refilled another short course of pain medicaiton to use only PRN.    2.) Anxiety- Lexapro seems to be working well for her and helping with the depression aspect. Continue that and the ativan for now.  RTC in 1-2 months or sooner PRN

## 2017-06-01 NOTE — THERAPY TREATMENT NOTE
Outpatient Physical Therapy Ortho Treatment Note  [facility]     Patient Name: Nerissa Lopez  : 1965  MRN: [id number]  Today's Date: 2017      Visit Date: 2017  Visits 2/2. Marc 17. MD f/charlee MENDIETA.   Visit Dx:    ICD-10-CM ICD-9-CM   1. Sciatica of left side M54.32 724.3       Patient Active Problem List   Diagnosis   • Hyperthyroidism   • Osteoporosis   • Vitamin D deficiency   • Menopausal syndrome (hot flashes)   • Anal fistula   • Anemia   • Astigmatism   • Death of family member   • Gastroesophageal reflux disease   • Generalized anxiety disorder   • Goiter   • Graves disease   • Hematuria   • Lesion of eyelid   • Lumbosacral spondylolysis   • Menopausal syndrome   • Myopia   • Lumbar back pain   • Panic attack   • Presbyopia   • Takotsubo cardiomyopathy   • Thoracic spondylosis        Past Medical History:   Diagnosis Date   • Anal fistula    • Anemia    • Astigmatism    • Broken heart syndrome 2016    when mother passed away   • Death of family member     Mother passes away.   • Generalized anxiety disorder    • GERD (gastroesophageal reflux disease)    • Goiter    • Graves disease    • Heartburn    • Hematuria    • Hyperthyroidism    • Lesion of eyelid     LLL   • Lumbosacral spondylosis    • Menopausal syndrome    • Myopia    • Osteoporosis    • Pain in back     Lumbar region   • Pain in joint involving left lower leg    • Pain in wrist    • Panic attack    • Presbyopia    • Right upper quadrant pain    • Takotsubo cardiomyopathy    • Thoracic spondylosis         Past Surgical History:   Procedure Laterality Date   • CARDIAC CATHETERIZATION  2016    Normal coronaries with no obstructive disease. Nonischemic stress induced cardiomyopathy known as Takotsubo syndrome.   • COLONOSCOPY  2016    Removal of anal fistula   • CYST REMOVAL Right     right breast   • EYELID CARCINOMA EXCISION  06/15/2015   • HYSTERECTOMY  2014    Total abdominal hysterectomy,  bilateral salpingo-oophorectomy. Menorrhagia and leiomyomatous uterus.   • SKIN BIOPSY  06/29/2014   • TUBAL ABDOMINAL LIGATION     • WOUND CLOSURE  07/20/2014    Layer closure of wound.   • WOUND CLOSURE  01/16/2014    Layer closure of wound             PT Ortho       06/01/17 1100    Subjective Comments    Subjective Comments Pt c/o LBP today that sometimes goes into R hip.   -    Precautions and Contraindications    Precautions/Limitations no known precautions/limitations  -    Subjective Pain    Able to rate subjective pain? yes  -    Pre-Treatment Pain Level 5  -    Post-Treatment Pain Level 3  -      User Key  (r) = Recorded By, (t) = Taken By, (c) = Cosigned By    Initials Name Provider Type    NIDHI Aiken PTA Physical Therapy Assistant                            PT Assessment/Plan       06/01/17 1100       PT Assessment    Functional Limitations Impaired gait;Limitations in functional capacity and performance;Performance in leisure activities;Performance in sport activities  -     Impairments Balance;Gait  -     Assessment Comments Fair annalise of added TE today. Good efort with all TE. No goals met thus far.   -     Rehab Potential Good  -     Patient/caregiver participated in establishment of treatment plan and goals Yes  -     Patient would benefit from skilled therapy intervention Yes  -     PT Plan    PT Frequency 2x/week  -     Predicted Duration of Therapy Intervention (days/wks) 4-6 weeks  -     PT Plan Comments Cont PT per POC.   -       User Key  (r) = Recorded By, (t) = Taken By, (c) = Cosigned By    Initials Name Provider Type    NIDHI Aiken PTA Physical Therapy Assistant                Modalities       06/01/17 1100          ELECTRICAL STIMULATION    Attended/Unattended Unattended  -      Stimulation Type IFC  -      Max mAmp 5  -JW      Location/Electrode Placement/Other LB  -      Rx Minutes 15 mins  -        User Key  (r) = Recorded By, (t) =  Taken By, (c) = Cosigned By    Initials Name Provider Type    NIDHI Pedro Luis Aiken PTA Physical Therapy Assistant                Exercises       06/01/17 1100          Subjective Comments    Subjective Comments Pt c/o LBP today that sometimes goes into R hip.   -JW      Subjective Pain    Able to rate subjective pain? yes  -JW      Pre-Treatment Pain Level 5  -JW      Post-Treatment Pain Level 3  -JW      Exercise 1    Exercise Name 1 LTR  -JW      Cueing 1 Verbal  -JW      Sets 1 1  -JW      Reps 1 10  -JW      Exercise 2    Exercise Name 2 BKLL  -JW      Cueing 2 Verbal  -JW      Sets 2 2  -JW      Reps 2 10  -JW      Exercise 3    Exercise Name 3 BKFO  -JW      Cueing 3 Verbal  -JW      Equipment 3 Theraband  -JW      Resistance 3 Yellow  -JW      Reps 3 20  -JW      Exercise 4    Exercise Name 4 Bridges with abd iso  -JW      Cueing 4 Verbal  -JW      Equipment 4 Theraband  -JW      Resistance 4 Yellow  -JW      Sets 4 2  -JW      Reps 4 10  -JW      Exercise 5    Exercise Name 5 Prone lying  -JW      Cueing 5 Verbal  -JW      Time (Minutes) 5 2'  -JW      Exercise 6    Exercise Name 6 Seated thoracic ext  -JW      Cueing 6 Demo  -JW      Sets 6 2  -JW      Reps 6 10  -JW      Exercise 7    Exercise Name 7 SLR  -JW      Sets 7 2  -JW      Reps 7 10  -JW      Exercise 8    Exercise Name 8 Postural re ed  -JW        User Key  (r) = Recorded By, (t) = Taken By, (c) = Cosigned By    Initials Name Provider Type    NIDHI Pedro Luis Aiken PTA Physical Therapy Assistant                               PT OP Goals       06/01/17 1100       PT Short Term Goals    STG Date to Achieve 06/13/17  -JW     STG 1 Tolerate 45 minute treatment session, no increase in pain  -JW     STG 1 Progress Not Met  -JW     STG 2 Able to stand 40 minutes or greater, no increase in pain  -JW     STG 2 Progress Not Met  -JW     STG 3 Able to sit 40 minutes or greater, no increase in pain  -JW     STG 3 Progress Not Met  -JW     STG 4 Able to walk  0.75 miles or greater, no increase in pain  -     STG 4 Progress Not Met  -     STG 5 Ambulate 4 reciprocal stairs, no increase in pain.  -     STG 5 Progress Not Met  -     Long Term Goals    LTG Date to Achieve 07/04/17  -     LTG 1 Modified Oswestry to 25% or less.  -     LTG 1 Progress Not Met  -     LTG 2 Able to stand 55 minutes or greater, no increase in pain  -     LTG 2 Progress Not Met  -     LTG 3 Able to sit 55 minutes or greater, no increase in pain  -     LTG 3 Progress Not Met  -     LTG 4 Able to walk 1 mile or greater, no increase in pain  -     LTG 4 Progress Not Met  -     LTG 5 B hip flexion to 4+/5 or greater  -     LTG 5 Progress Not Met  -     LTG 6 B knee extension to 4+/5 or greater  -     LTG 6 Progress Not Met  -     LTG 7 B knee flexion to 4+/5 or greater  -     LTG 7 Progress Not Met  -       User Key  (r) = Recorded By, (t) = Taken By, (c) = Cosigned By    Initials Name Provider Type    NIDHI Aiken PTA Physical Therapy Assistant                    Time Calculation:   Start Time: 1104  Stop Time: 1200  Time Calculation (min): 56 min  Total Timed Code Minutes- PT: 56 minute(s)    Therapy Charges for Today     Code Description Service Date Service Provider Modifiers Qty    00987263817 HC PT THER PROC EA 15 MIN 6/1/2017 Pedro Luis Aiken PTA GP 3    [id number] HC PT ELECTRICAL STIM UNATTENDED 6/1/2017 Pedro Luis Aiken PTA  1                    Pedro Luis Aiken PTA  6/1/2017

## 2017-06-06 ENCOUNTER — HOSPITAL ENCOUNTER (OUTPATIENT)
Dept: PHYSICAL THERAPY | Facility: HOSPITAL | Age: 52
Setting detail: THERAPIES SERIES
Discharge: HOME | End: 2017-06-06

## 2017-06-06 DIAGNOSIS — M54.32 SCIATICA OF LEFT SIDE: Primary | ICD-10-CM

## 2017-06-06 PROCEDURE — 97110 THERAPEUTIC EXERCISES: CPT | Performed by: PHYSICAL THERAPIST

## 2017-06-06 PROCEDURE — G0283 ELEC STIM OTHER THAN WOUND: HCPCS | Performed by: PHYSICAL THERAPIST

## 2017-06-06 PROCEDURE — 97140 MANUAL THERAPY 1/> REGIONS: CPT | Performed by: PHYSICAL THERAPIST

## 2017-06-06 NOTE — THERAPY TREATMENT NOTE
Outpatient Physical Therapy Ortho Treatment Note  [facility]     Patient Name: Nerissa Lopez  : 1965  MRN: [id number]  Today's Date: 2017      Visit Date: 2017      Visit 3/3 (6 until 17)  Recert date 17  MD visit 17  No improvement        Visit Dx:    ICD-10-CM ICD-9-CM   1. Sciatica of left side M54.32 724.3       Patient Active Problem List   Diagnosis   • Hyperthyroidism   • Osteoporosis   • Vitamin D deficiency   • Menopausal syndrome (hot flashes)   • Anal fistula   • Anemia   • Astigmatism   • Death of family member   • Gastroesophageal reflux disease   • Generalized anxiety disorder   • Goiter   • Graves disease   • Hematuria   • Lesion of eyelid   • Lumbosacral spondylolysis   • Menopausal syndrome   • Myopia   • Lumbar back pain   • Panic attack   • Presbyopia   • Takotsubo cardiomyopathy   • Thoracic spondylosis        Past Medical History:   Diagnosis Date   • Anal fistula    • Anemia    • Astigmatism    • Broken heart syndrome 2016    when mother passed away   • Death of family member     Mother passes away.   • Generalized anxiety disorder    • GERD (gastroesophageal reflux disease)    • Goiter    • Graves disease    • Heartburn    • Hematuria    • Hyperthyroidism    • Lesion of eyelid     LLL   • Lumbosacral spondylosis    • Menopausal syndrome    • Myopia    • Osteoporosis    • Pain in back     Lumbar region   • Pain in joint involving left lower leg    • Pain in wrist    • Panic attack    • Presbyopia    • Right upper quadrant pain    • Takotsubo cardiomyopathy    • Thoracic spondylosis         Past Surgical History:   Procedure Laterality Date   • CARDIAC CATHETERIZATION  2016    Normal coronaries with no obstructive disease. Nonischemic stress induced cardiomyopathy known as Takotsubo syndrome.   • COLONOSCOPY  2016    Removal of anal fistula   • CYST REMOVAL Right     right breast   • EYELID CARCINOMA EXCISION  06/15/2015   •  HYSTERECTOMY  04/14/2014    Total abdominal hysterectomy, bilateral salpingo-oophorectomy. Menorrhagia and leiomyomatous uterus.   • SKIN BIOPSY  06/29/2014   • TUBAL ABDOMINAL LIGATION     • WOUND CLOSURE  07/20/2014    Layer closure of wound.   • WOUND CLOSURE  01/16/2014    Layer closure of wound                             PT Assessment/Plan       06/06/17 1002       PT Assessment    Assessment Comments good effort towards exercise.  -KW     PT Plan    PT Plan Comments Cont PT POC  -KW       User Key  (r) = Recorded By, (t) = Taken By, (c) = Cosigned By    Initials Name Provider Type    LEILA Alanis, PT Physical Therapist                Modalities       06/06/17 0900          Subjective Comments    Subjective Comments Reports on and off back pain but is compliant w/ HEP  -KW      Subjective Pain    Able to rate subjective pain? yes  -KW      Pre-Treatment Pain Level 4  -KW      Post-Treatment Pain Level 0  -KW      Ice    Ice Applied Yes  -KW      Location low back  -KW      Rx Minutes 15 mins  -KW      Ice S/P Rx Yes  -KW      ELECTRICAL STIMULATION    Attended/Unattended Unattended  -KW      Stimulation Type IFC  -KW      Max mAmp 5  -KW      Location/Electrode Placement/Other LB  -KW      Rx Minutes 15 mins  -KW        User Key  (r) = Recorded By, (t) = Taken By, (c) = Cosigned By    Initials Name Provider Type    LEILA Alanis, PT Physical Therapist                Exercises       06/06/17 0900          Subjective Comments    Subjective Comments Reports on and off back pain but is compliant w/ HEP  -KW      Subjective Pain    Able to rate subjective pain? yes  -KW      Pre-Treatment Pain Level 4  -KW      Post-Treatment Pain Level 0  -KW      Exercise 1    Exercise Name 1 pro II level 1  -KW      Time (Minutes) 1 10  -KW      Exercise 2    Exercise Name 2 4 way SLR  -KW      Sets 2 1  -KW      Reps 2 10  -KW      Exercise 3    Exercise Name 3 bridges  -KW      Sets 3 3  -KW      Reps 3 10  -KW       Exercise 4    Exercise Name 4 prone hip flexor stretch  -KW      Sets 4 3  -KW      Time (Seconds) 4 30  -KW      Exercise 5    Exercise Name 5 hamstrings stretch  -KW      Sets 5 3  -KW      Time (Seconds) 5 30  -KW      Exercise 6    Exercise Name 6 wall slides w/ bal  -KW      Sets 6 3  -KW      Reps 6 10  -KW      Exercise 7    Exercise Name 7 upstairs track lap  -KW      Time (Seconds) 7 55  -KW        User Key  (r) = Recorded By, (t) = Taken By, (c) = Cosigned By    Initials Name Provider Type    LEILA Alanis PT Physical Therapist                        Manual Rx (last 36 hours)      Manual Treatments       06/06/17 0900          Manual Rx 1    Manual Rx 1 Location cupping low back  -KW      Manual Rx 1 Duration 8  -KW        User Key  (r) = Recorded By, (t) = Taken By, (c) = Cosigned By    Initials Name Provider Type    LEILA Alanis PT Physical Therapist                PT OP Goals       06/06/17 0800       PT Short Term Goals    STG Date to Achieve 06/13/17  -KW     STG 1 Tolerate 45 minute treatment session, no increase in pain  -KW     STG 1 Progress Not Met  -KW     STG 2 Able to stand 40 minutes or greater, no increase in pain  -KW     STG 2 Progress Not Met  -KW     STG 3 Able to sit 40 minutes or greater, no increase in pain  -KW     STG 3 Progress Not Met  -KW     STG 4 Able to walk 0.75 miles or greater, no increase in pain  -KW     STG 4 Progress Not Met  -KW     STG 5 Ambulate 4 reciprocal stairs, no increase in pain.  -KW     STG 5 Progress Not Met  -KW     Long Term Goals    LTG Date to Achieve 07/04/17  -KW     LTG 1 Modified Oswestry to 25% or less.  -KW     LTG 1 Progress Not Met  -KW     LTG 2 Able to stand 55 minutes or greater, no increase in pain  -KW     LTG 2 Progress Not Met  -KW     LTG 3 Able to sit 55 minutes or greater, no increase in pain  -KW     LTG 3 Progress Not Met  -KW     LTG 4 Able to walk 1 mile or greater, no increase in pain  -KW     LTG 4 Progress Not  Met  -KW     LTG 5 B hip flexion to 4+/5 or greater  -KW     LTG 5 Progress Not Met  -KW     LTG 6 B knee extension to 4+/5 or greater  -KW     LTG 6 Progress Not Met  -KW     LTG 7 B knee flexion to 4+/5 or greater  -KW     LTG 7 Progress Not Met  -KW     Time Calculation    PT Goal Re-Cert Due Date 06/13/17  -KW       User Key  (r) = Recorded By, (t) = Taken By, (c) = Cosigned By    Initials Name Provider Type    LEILA Alanis, PT Physical Therapist                Therapy Education       06/06/17 1002          Therapy Education    Given HEP  -KW      Program New  -KW      How Provided Demonstration  -KW      Provided to Patient  -KW      Level of Understanding Demonstrated  -KW        User Key  (r) = Recorded By, (t) = Taken By, (c) = Cosigned By    Initials Name Provider Type    LEILA Alanis, AYAZ Physical Therapist                Time Calculation:   Start Time: 0900  Stop Time: 1010  Time Calculation (min): 70 min  Total Timed Code Minutes- PT: 70 minute(s)    Therapy Charges for Today     Code Description Service Date Service Provider Modifiers Qty    31027575854 HC PT THER PROC EA 15 MIN 6/6/2017 Yani Alanis, PT GP 3    77938371985 HC PT ELECTRICAL STIM UNATTENDED 6/6/2017 Yani Alanis, PT  1    47712194852 HC PT MANUAL THERAPY EA 15 MIN 6/6/2017 Yani Alanis, PT GP 1                    Yani Alanis, PT  6/6/2017

## 2017-06-08 ENCOUNTER — HOSPITAL ENCOUNTER (OUTPATIENT)
Dept: PHYSICAL THERAPY | Facility: HOSPITAL | Age: 52
Setting detail: THERAPIES SERIES
Discharge: HOME | End: 2017-06-08

## 2017-06-08 DIAGNOSIS — M54.32 SCIATICA OF LEFT SIDE: Primary | ICD-10-CM

## 2017-06-08 PROCEDURE — 97110 THERAPEUTIC EXERCISES: CPT | Performed by: PHYSICAL THERAPIST

## 2017-06-08 PROCEDURE — G0283 ELEC STIM OTHER THAN WOUND: HCPCS | Performed by: PHYSICAL THERAPIST

## 2017-06-08 NOTE — THERAPY TREATMENT NOTE
Outpatient Physical Therapy Ortho Treatment Note  [facility]     Patient Name: Nerissa Lopez  : 1965  MRN: [id number]  Today's Date: 2017      Visit Date: 2017      Visit  (6 until 17)  Recert date 17  MD visit 17  Slight improvement        Visit Dx:    ICD-10-CM ICD-9-CM   1. Sciatica of left side M54.32 724.3       Patient Active Problem List   Diagnosis   • Hyperthyroidism   • Osteoporosis   • Vitamin D deficiency   • Menopausal syndrome (hot flashes)   • Anal fistula   • Anemia   • Astigmatism   • Death of family member   • Gastroesophageal reflux disease   • Generalized anxiety disorder   • Goiter   • Graves disease   • Hematuria   • Lesion of eyelid   • Lumbosacral spondylolysis   • Menopausal syndrome   • Myopia   • Lumbar back pain   • Panic attack   • Presbyopia   • Takotsubo cardiomyopathy   • Thoracic spondylosis        Past Medical History:   Diagnosis Date   • Anal fistula    • Anemia    • Astigmatism    • Broken heart syndrome 2016    when mother passed away   • Death of family member     Mother passes away.   • Generalized anxiety disorder    • GERD (gastroesophageal reflux disease)    • Goiter    • Graves disease    • Heartburn    • Hematuria    • Hyperthyroidism    • Lesion of eyelid     LLL   • Lumbosacral spondylosis    • Menopausal syndrome    • Myopia    • Osteoporosis    • Pain in back     Lumbar region   • Pain in joint involving left lower leg    • Pain in wrist    • Panic attack    • Presbyopia    • Right upper quadrant pain    • Takotsubo cardiomyopathy    • Thoracic spondylosis         Past Surgical History:   Procedure Laterality Date   • CARDIAC CATHETERIZATION  2016    Normal coronaries with no obstructive disease. Nonischemic stress induced cardiomyopathy known as Takotsubo syndrome.   • COLONOSCOPY  2016    Removal of anal fistula   • CYST REMOVAL Right     right breast   • EYELID CARCINOMA EXCISION  06/15/2015   •  HYSTERECTOMY  04/14/2014    Total abdominal hysterectomy, bilateral salpingo-oophorectomy. Menorrhagia and leiomyomatous uterus.   • SKIN BIOPSY  06/29/2014   • TUBAL ABDOMINAL LIGATION     • WOUND CLOSURE  07/20/2014    Layer closure of wound.   • WOUND CLOSURE  01/16/2014    Layer closure of wound                             PT Assessment/Plan       06/08/17 0935 06/08/17 0911    PT Assessment    Assessment Comments some discomfort with alt UE LE ext, and pro II  -KW good effort, annalise ther ex well.  -KW    PT Plan    PT Plan Comments  Cont POC LBP and sciatica  -KW      User Key  (r) = Recorded By, (t) = Taken By, (c) = Cosigned By    Initials Name Provider Type    LEILA Alanis, PT Physical Therapist                Modalities       06/08/17 0900          Moist Heat    MH Applied Yes  -KW      Location low back  -KW      Rx Minutes 15 mins  -KW      MH S/P Rx Yes  -KW      ELECTRICAL STIMULATION    Attended/Unattended Unattended  -KW      Stimulation Type IFC  -KW      Location/Electrode Placement/Other LB  -KW      Rx Minutes 15 mins  -KW        User Key  (r) = Recorded By, (t) = Taken By, (c) = Cosigned By    Initials Name Provider Type    LEILA Alanis, PT Physical Therapist                Exercises       06/08/17 0900          Subjective Comments    Subjective Comments Reports stifness in low back this morning. States no too bad after last visit  -KW      Subjective Pain    Able to rate subjective pain? yes  -KW      Pre-Treatment Pain Level 4  -KW      Exercise 1    Exercise Name 1 pro II level 2  -KW      Time (Minutes) 1 10  -KW      Exercise 2    Exercise Name 2 SKC  -KW      Sets 2 2  -KW      Reps 2 10  -KW      Exercise 3    Exercise Name 3 trans abs  -KW      Sets 3 1  -KW      Reps 3 10  -KW      Exercise 4    Exercise Name 4 bridges w/ ball  -KW      Sets 4 3  -KW      Reps 4 10  -KW      Exercise 5    Exercise Name 5 LTR  -KW      Sets 5 2  -KW      Reps 5 10  -KW      Exercise 6     Exercise Name 6 Alt UE and LE  -KW      Sets 6 2  -KW      Reps 6 10  -KW      Exercise 7    Exercise Name 7 hamstring stretch  -KW      Sets 7 3  -KW      Time (Seconds) 7 30  -KW        User Key  (r) = Recorded By, (t) = Taken By, (c) = Cosigned By    Initials Name Provider Type    LEILA Alanis, PT Physical Therapist                               PT OP Goals       06/08/17 0900       PT Short Term Goals    STG Date to Achieve 06/13/17  -KW     STG 1 Tolerate 45 minute treatment session, no increase in pain  -KW     STG 1 Progress Met  -KW     STG 2 Able to stand 40 minutes or greater, no increase in pain  -KW     STG 2 Progress Not Met  -KW     STG 3 Able to sit 40 minutes or greater, no increase in pain  -KW     STG 3 Progress Not Met  -KW     STG 4 Able to walk 0.75 miles or greater, no increase in pain  -KW     STG 4 Progress Not Met  -KW     STG 5 Ambulate 4 reciprocal stairs, no increase in pain.  -KW     STG 5 Progress Not Met  -KW     Long Term Goals    LTG Date to Achieve 07/04/17  -KW     LTG 1 Modified Oswestry to 25% or less.  -KW     LTG 1 Progress Not Met  -KW     LTG 2 Able to stand 55 minutes or greater, no increase in pain  -KW     LTG 2 Progress Not Met  -KW     LTG 3 Able to sit 55 minutes or greater, no increase in pain  -KW     LTG 3 Progress Not Met  -KW     LTG 4 Able to walk 1 mile or greater, no increase in pain  -KW     LTG 4 Progress Not Met  -KW     LTG 5 B hip flexion to 4+/5 or greater  -KW     LTG 5 Progress Not Met  -KW     LTG 6 B knee extension to 4+/5 or greater  -KW     LTG 6 Progress Not Met  -KW     LTG 7 B knee flexion to 4+/5 or greater  -KW     LTG 7 Progress Not Met  -KW     Time Calculation    PT Goal Re-Cert Due Date 06/13/17  -KW       User Key  (r) = Recorded By, (t) = Taken By, (c) = Cosigned By    Initials Name Provider Type    LEILA Alanis, PT Physical Therapist                Therapy Education       06/08/17 0910          Therapy Education    Given HEP   -KW      Program Reinforced  -KW      How Provided Verbal  -KW      Provided to Patient  -KW      Level of Understanding Verbalized  -KW        User Key  (r) = Recorded By, (t) = Taken By, (c) = Cosigned By    Initials Name Provider Type    KW Yani Alanis, PT Physical Therapist                Time Calculation:   Start Time: 0900  Stop Time: 0947  Time Calculation (min): 47 min  Total Timed Code Minutes- PT: 47 minute(s)    Therapy Charges for Today     Code Description Service Date Service Provider Modifiers Qty    82095104854 HC PT ELECTRICAL STIM UNATTENDED 6/8/2017 Yani Alanis, PT  1    19739672186 HC PT THER PROC EA 15 MIN 6/8/2017 Yani Alanis, PT GP 2                    Yani Alanis, PT  6/8/2017

## 2017-06-13 ENCOUNTER — HOSPITAL ENCOUNTER (OUTPATIENT)
Dept: PHYSICAL THERAPY | Facility: HOSPITAL | Age: 52
Setting detail: THERAPIES SERIES
Discharge: HOME | End: 2017-06-13

## 2017-06-13 DIAGNOSIS — M54.32 SCIATICA OF LEFT SIDE: Primary | ICD-10-CM

## 2017-06-13 PROCEDURE — G0283 ELEC STIM OTHER THAN WOUND: HCPCS | Performed by: PHYSICAL THERAPIST

## 2017-06-13 PROCEDURE — 97035 APP MDLTY 1+ULTRASOUND EA 15: CPT | Performed by: PHYSICAL THERAPIST

## 2017-06-13 PROCEDURE — 97110 THERAPEUTIC EXERCISES: CPT | Performed by: PHYSICAL THERAPIST

## 2017-06-13 NOTE — THERAPY TREATMENT NOTE
Outpatient Physical Therapy Ortho Treatment Note  [facility]     Patient Name: Nerissa Lopez  : 1965  MRN: [id number]  Today's Date: 2017      Visit Date: 2017      Visit  (6 visits until 17)  recert date 17  MD visit 17  Some improvement      Dx: L sciatica        Visit Dx:    ICD-10-CM ICD-9-CM   1. Sciatica of left side M54.32 724.3       Patient Active Problem List   Diagnosis   • Hyperthyroidism   • Osteoporosis   • Vitamin D deficiency   • Menopausal syndrome (hot flashes)   • Anal fistula   • Anemia   • Astigmatism   • Death of family member   • Gastroesophageal reflux disease   • Generalized anxiety disorder   • Goiter   • Graves disease   • Hematuria   • Lesion of eyelid   • Lumbosacral spondylolysis   • Menopausal syndrome   • Myopia   • Lumbar back pain   • Panic attack   • Presbyopia   • Takotsubo cardiomyopathy   • Thoracic spondylosis        Past Medical History:   Diagnosis Date   • Anal fistula    • Anemia    • Astigmatism    • Broken heart syndrome 2016    when mother passed away   • Death of family member     Mother passes away.   • Generalized anxiety disorder    • GERD (gastroesophageal reflux disease)    • Goiter    • Graves disease    • Heartburn    • Hematuria    • Hyperthyroidism    • Lesion of eyelid     LLL   • Lumbosacral spondylosis    • Menopausal syndrome    • Myopia    • Osteoporosis    • Pain in back     Lumbar region   • Pain in joint involving left lower leg    • Pain in wrist    • Panic attack    • Presbyopia    • Right upper quadrant pain    • Takotsubo cardiomyopathy    • Thoracic spondylosis         Past Surgical History:   Procedure Laterality Date   • CARDIAC CATHETERIZATION  2016    Normal coronaries with no obstructive disease. Nonischemic stress induced cardiomyopathy known as Takotsubo syndrome.   • COLONOSCOPY  2016    Removal of anal fistula   • CYST REMOVAL Right     right breast   • EYELID CARCINOMA  EXCISION  06/15/2015   • HYSTERECTOMY  04/14/2014    Total abdominal hysterectomy, bilateral salpingo-oophorectomy. Menorrhagia and leiomyomatous uterus.   • SKIN BIOPSY  06/29/2014   • TUBAL ABDOMINAL LIGATION     • WOUND CLOSURE  07/20/2014    Layer closure of wound.   • WOUND CLOSURE  01/16/2014    Layer closure of wound             PT Ortho       06/13/17 1000    Subjective Comments    Subjective Comments Reports pain in lower back and R hip. Will see MD next week. L sciatica has been getting much better, her new complaint has been R hip pain.  -KW    Lumbar/SI Special Tests    SI Compression Test (SI Dysfunction) Right:;Positive  -KW    Hip Special Tests    RICHARDSON (hip vs SI pathology) Right:;Positive  -KW      User Key  (r) = Recorded By, (t) = Taken By, (c) = Cosigned By    Initials Name Provider Type    LEILA Alanis, PT Physical Therapist                            PT Assessment/Plan       06/13/17 1020       PT Assessment    Assessment Comments L sciatia is much improved. new complaints are R hip pain.  -KW     PT Plan    PT Plan Comments Cont POC LBP. Next visit aquatics, explore custom foot orthotics and on next MD vist R hip xray  -KW       User Key  (r) = Recorded By, (t) = Taken By, (c) = Cosigned By    Initials Name Provider Type    LEILA Alanis, PT Physical Therapist                Modalities       06/13/17 1000          ELECTRICAL STIMULATION    Attended/Unattended Unattended  -KW      Stimulation Type IFC  -KW      Location/Electrode Placement/Other LB  -KW      Rx Minutes 20 mins  -KW      Ultrasound [id number]    Location LB   -KW      Rx Minutes 8  -KW      Frequency 1.0 MHz  -KW      Intensity - Wts/cm 1.2  -KW        User Key  (r) = Recorded By, (t) = Taken By, (c) = Cosigned By    Initials Name Provider Type    LEILA Alanis, PT Physical Therapist                Exercises       06/13/17 1000          Subjective Comments    Subjective Comments Reports pain in lower back and R  hip. Will see MD next week. L sciatica has been getting much better, her new complaint has been R hip pain.  -KW      Subjective Pain    Able to rate subjective pain? yes  -KW      Pre-Treatment Pain Level 5  -KW      Post-Treatment Pain Level 1  -KW      Exercise 1    Exercise Name 1 pro II level 1  -KW      Time (Minutes) 1 10  -KW      Exercise 2    Exercise Name 2 SKC  -KW      Sets 2 1  -KW      Reps 2 10  -KW      Exercise 3    Exercise Name 3 Hamstring string stretch  -KW      Sets 3 3  -KW      Time (Seconds) 3 30  -KW      Exercise 4    Exercise Name 4 bilat leg pull  -KW      Sets 4 3  -KW      Time (Seconds) 4 30  -KW        User Key  (r) = Recorded By, (t) = Taken By, (c) = Cosigned By    Initials Name Provider Type    LEILA Alanis, PT Physical Therapist                               PT OP Goals       06/13/17 1000       PT Short Term Goals    STG Date to Achieve 06/27/17  -KW     STG 1 Tolerate 45 minute treatment session, no increase in pain  -KW     STG 1 Progress Met  -KW     STG 2 Able to stand 40 minutes or greater, no increase in pain  -KW     STG 2 Progress Not Met  -KW     STG 3 Able to sit 40 minutes or greater, no increase in pain  -KW     STG 3 Progress Not Met  -KW     STG 4 Able to walk 0.75 miles or greater, no increase in pain  -KW     STG 4 Progress Not Met  -KW     STG 5 Ambulate 4 reciprocal stairs, no increase in pain.  -KW     STG 5 Progress Not Met  -KW     Long Term Goals    LTG Date to Achieve 07/04/17  -KW     LTG 1 Modified Oswestry to 25% or less.  -KW     LTG 1 Progress Not Met  -KW     LTG 2 Able to stand 55 minutes or greater, no increase in pain  -KW     LTG 2 Progress Not Met  -KW     LTG 3 Able to sit 55 minutes or greater, no increase in pain  -KW     LTG 3 Progress Not Met  -KW     LTG 4 Able to walk 1 mile or greater, no increase in pain  -KW     LTG 4 Progress Not Met  -KW     LTG 5 B hip flexion to 4+/5 or greater  -KW     LTG 5 Progress Not Met  -KW     LTG 6  B knee extension to 4+/5 or greater  -KW     LTG 6 Progress Not Met  -KW     LTG 7 B knee flexion to 4+/5 or greater  -KW     LTG 7 Progress Not Met  -KW     Time Calculation    PT Goal Re-Cert Due Date 07/04/17  -KW       User Key  (r) = Recorded By, (t) = Taken By, (c) = Cosigned By    Initials Name Provider Type    LEILA Alanis, AYAZ Physical Therapist                Therapy Education       06/13/17 1010          Therapy Education    Given HEP  -KW      Program Reinforced  -KW      How Provided Verbal  -KW      Provided to Patient  -KW      Level of Understanding Verbalized  -KW        User Key  (r) = Recorded By, (t) = Taken By, (c) = Cosigned By    Initials Name Provider Type    LEILA Alanis, PT Physical Therapist                Time Calculation:   Start Time: 0933  Stop Time: 1027  Time Calculation (min): 54 min  Total Timed Code Minutes- PT: 54 minute(s)    Therapy Charges for Today     Code Description Service Date Service Provider Modifiers Qty    40266972016 HC PT THER PROC EA 15 MIN 6/13/2017 Yani Alanis, PT GP 2    75707298163 HC PT ELECTRICAL STIM UNATTENDED 6/13/2017 Yani Alanis, PT  1    10623275259 HC PT ULTRASOUND EA 15 MIN 6/13/2017 Yani Alanis, PT GP 1                    Yani Alanis, PT  6/13/2017

## 2017-06-15 ENCOUNTER — APPOINTMENT (OUTPATIENT)
Dept: PHYSICAL THERAPY | Facility: HOSPITAL | Age: 52
End: 2017-06-15

## 2017-06-20 ENCOUNTER — HOSPITAL ENCOUNTER (OUTPATIENT)
Dept: PHYSICAL THERAPY | Facility: HOSPITAL | Age: 52
Setting detail: THERAPIES SERIES
Discharge: HOME | End: 2017-06-20

## 2017-06-20 ENCOUNTER — TELEPHONE (OUTPATIENT)
Dept: ENDOCRINOLOGY | Facility: CLINIC | Age: 52
End: 2017-06-20

## 2017-06-20 DIAGNOSIS — M54.32 SCIATICA OF LEFT SIDE: Primary | ICD-10-CM

## 2017-06-20 PROCEDURE — 97113 AQUATIC THERAPY/EXERCISES: CPT | Performed by: PHYSICAL THERAPIST

## 2017-06-24 ENCOUNTER — APPOINTMENT (OUTPATIENT)
Dept: GENERAL RADIOLOGY | Facility: HOSPITAL | Age: 52
End: 2017-06-24

## 2017-06-24 ENCOUNTER — HOSPITAL ENCOUNTER (EMERGENCY)
Facility: HOSPITAL | Age: 52
Discharge: HOME | End: 2017-06-24
Attending: EMERGENCY MEDICINE | Admitting: EMERGENCY MEDICINE

## 2017-06-24 VITALS
DIASTOLIC BLOOD PRESSURE: 69 MMHG | OXYGEN SATURATION: 99 % | TEMPERATURE: 98.4 F | WEIGHT: 141.7 LBS | BODY MASS INDEX: 23.61 KG/M2 | HEIGHT: 65 IN | RESPIRATION RATE: 16 BRPM | HEART RATE: 58 BPM | SYSTOLIC BLOOD PRESSURE: 110 MMHG
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
## 2017-06-24 PROCEDURE — 99283 EMERGENCY DEPT VISIT LOW MDM: CPT

## 2017-06-24 PROCEDURE — 25010000002 TDAP 5-2.5-18.5 LF-MCG/0.5 SUSPENSION: Performed by: NURSE PRACTITIONER

## 2017-06-24 PROCEDURE — 90471 IMMUNIZATION ADMIN: CPT | Performed by: NURSE PRACTITIONER

## 2017-06-24 PROCEDURE — 90715 TDAP VACCINE 7 YRS/> IM: CPT | Performed by: NURSE PRACTITIONER

## 2017-06-24 PROCEDURE — 73552 X-RAY EXAM OF FEMUR 2/>: CPT

## 2017-06-24 RX ORDER — AMOXICILLIN AND CLAVULANATE POTASSIUM 875; 125 MG/1; MG/1
1 TABLET, FILM COATED ORAL 2 TIMES DAILY
Qty: 20 TABLET | Refills: 0 | Status: SHIPPED | OUTPATIENT
Start: 2017-06-24 | End: 2017-07-27

## 2017-06-24 RX ORDER — FLUCONAZOLE 150 MG/1
TABLET ORAL
Qty: 2 TABLET | Refills: 0 | Status: SHIPPED | OUTPATIENT
Start: 2017-06-24 | End: 2017-06-30

## 2017-06-24 RX ORDER — HYDROCODONE BITARTRATE AND ACETAMINOPHEN 5; 325 MG/1; MG/1
2 TABLET ORAL ONCE
Status: COMPLETED | OUTPATIENT
Start: 2017-06-24 | End: 2017-06-24

## 2017-06-24 RX ADMIN — HYDROCODONE BITARTRATE AND ACETAMINOPHEN 2 TABLET: 5; 325 TABLET ORAL at 17:43

## 2017-06-24 RX ADMIN — TETANUS TOXOID, REDUCED DIPHTHERIA TOXOID AND ACELLULAR PERTUSSIS VACCINE, ADSORBED 0.5 ML: 5; 2.5; 8; 8; 2.5 SUSPENSION INTRAMUSCULAR at 17:44

## 2017-06-24 NOTE — DISCHARGE INSTRUCTIONS
Wash wound with soap and water daily.  Elevate left leg and apply ice/cold pack.  Complete the antibiotics.  Keep appointment with PCP.  May return to ED any worsening of symptoms.  May alternate tylenol and ibuprofen every 3 hours as needed for pain.

## 2017-06-24 NOTE — ED PROVIDER NOTES
Subjective   HPI Comments: C/o dog bite, her neighbor's dog, pit bull mix chased her and bit her left leg. States she was told it had it's shots and the police have the dog now. Still oozing blood from the site. Unknown last  Tetanus. Pt is ambulatory but with a limp.      History provided by:  Patient      Review of Systems   Constitutional: Negative.    Eyes: Negative.    Respiratory: Negative.    Cardiovascular: Negative.    Gastrointestinal: Negative.    Genitourinary: Negative.    Musculoskeletal: Negative.         Left leg pain   Skin: Negative.    Neurological: Negative.    Psychiatric/Behavioral: Negative.        Past Medical History:   Diagnosis Date   • Anal fistula    • Anemia    • Astigmatism    • Broken heart syndrome 03/02/2016    when mother passed away   • Death of family member     Mother passes away.   • Generalized anxiety disorder    • GERD (gastroesophageal reflux disease)    • Goiter    • Graves disease    • Heartburn    • Hematuria    • Hyperthyroidism    • Lesion of eyelid     LLL   • Lumbosacral spondylosis    • Menopausal syndrome    • Myopia    • Osteoporosis    • Pain in back     Lumbar region   • Pain in joint involving left lower leg    • Pain in wrist    • Panic attack    • Presbyopia    • Right upper quadrant pain    • Takotsubo cardiomyopathy    • Thoracic spondylosis        No Known Allergies    Past Surgical History:   Procedure Laterality Date   • CARDIAC CATHETERIZATION  03/02/2016    Normal coronaries with no obstructive disease. Nonischemic stress induced cardiomyopathy known as Takotsubo syndrome.   • COLONOSCOPY  01/30/2016    Removal of anal fistula   • CYST REMOVAL Right     right breast   • EYELID CARCINOMA EXCISION  06/15/2015   • HYSTERECTOMY  04/14/2014    Total abdominal hysterectomy, bilateral salpingo-oophorectomy. Menorrhagia and leiomyomatous uterus.   • SKIN BIOPSY  06/29/2014   • TUBAL ABDOMINAL LIGATION     • WOUND CLOSURE  07/20/2014    Layer closure of wound.  "  • WOUND CLOSURE  01/16/2014    Layer closure of wound       Family History   Problem Relation Age of Onset   • Heart disease Mother    • Cancer Father    • Diabetes Sister    • Thyroid disease Sister    • Diabetes Brother    • Cancer Maternal Grandmother      Ovarian Cancer   • Breast cancer Other    • Cancer Other    • Other Other      Gall Bladder disease   • Breast cancer Other    • Breast cancer Maternal Aunt        Social History     Social History   • Marital status:      Spouse name: N/A   • Number of children: N/A   • Years of education: N/A     Social History Main Topics   • Smoking status: Never Smoker   • Smokeless tobacco: Never Used   • Alcohol use No   • Drug use: Defer   • Sexual activity: Defer     Other Topics Concern   • None     Social History Narrative           Objective   Physical Exam   Constitutional: She is oriented to person, place, and time. She appears well-developed and well-nourished.   HENT:   Head: Normocephalic.   Eyes: EOM are normal. Pupils are equal, round, and reactive to light.   Neck: Normal range of motion. Neck supple.   Cardiovascular: Normal rate.    Pulmonary/Chest: Effort normal.   Abdominal: Soft. Bowel sounds are normal.   Musculoskeletal: She exhibits tenderness.   Left thigh puncture wound - dog bite. Edema and ecchymosis, slow oozing bleeding from the site. 3 mm.   Neurological: She is alert and oriented to person, place, and time.   Skin: Skin is warm and dry.   Nursing note and vitals reviewed.  /69  Pulse 66  Temp 98.4 °F (36.9 °C) (Oral)   Resp 16  Ht 65\" (165.1 cm)  Wt 141 lb 11.2 oz (64.3 kg)  LMP  (LMP Unknown)  SpO2 99%  BMI 23.58 kg/m2      Procedures         ED Course  ED Course      XR Femur 2 View Left   Final Result   Unremarkable left femur.      Electronically signed by:  Arnel Pack MD  6/24/2017 5:08 PM   CDT Workstation: [id number]                    MDM  Number of Diagnoses or Management Options  Dog bite, initial " encounter:   Diagnosis management comments: Dog bite wound left thigh today, neighbor's dog, now with the police. Edema and ecchymosis wound x 1 puncture wound. XR negative. Rx given for augmentin. Tetanus updated. norco given for pain in ED. She has home Rx.      Final diagnoses:   Dog bite, initial encounter            Rachel Dobbs, APRN  06/24/17 7889

## 2017-06-30 ENCOUNTER — OFFICE VISIT (OUTPATIENT)
Dept: FAMILY MEDICINE CLINIC | Facility: CLINIC | Age: 52
End: 2017-06-30

## 2017-06-30 ENCOUNTER — APPOINTMENT (OUTPATIENT)
Dept: LAB | Facility: HOSPITAL | Age: 52
End: 2017-06-30

## 2017-06-30 VITALS
HEIGHT: 65 IN | BODY MASS INDEX: 23.16 KG/M2 | WEIGHT: 139 LBS | DIASTOLIC BLOOD PRESSURE: 76 MMHG | SYSTOLIC BLOOD PRESSURE: 126 MMHG
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
## 2017-06-30 DIAGNOSIS — M54.42 CHRONIC BILATERAL LOW BACK PAIN WITH LEFT-SIDED SCIATICA: Primary | ICD-10-CM

## 2017-06-30 DIAGNOSIS — M54.6 BILATERAL THORACIC BACK PAIN, UNSPECIFIED CHRONICITY: ICD-10-CM

## 2017-06-30 DIAGNOSIS — G89.29 CHRONIC BILATERAL LOW BACK PAIN WITH LEFT-SIDED SCIATICA: Primary | ICD-10-CM

## 2017-06-30 DIAGNOSIS — Z79.899 HIGH RISK MEDICATION USE: ICD-10-CM

## 2017-06-30 DIAGNOSIS — F41.1 GENERALIZED ANXIETY DISORDER: ICD-10-CM

## 2017-06-30 PROCEDURE — G0481 DRUG TEST DEF 8-14 CLASSES: HCPCS | Performed by: FAMILY MEDICINE

## 2017-06-30 PROCEDURE — 99213 OFFICE O/P EST LOW 20 MIN: CPT | Performed by: FAMILY MEDICINE

## 2017-06-30 PROCEDURE — 80307 DRUG TEST PRSMV CHEM ANLYZR: CPT | Performed by: FAMILY MEDICINE

## 2017-06-30 RX ORDER — HYDROCODONE BITARTRATE AND ACETAMINOPHEN 5; 325 MG/1; MG/1
1 TABLET ORAL 2 TIMES DAILY PRN
Qty: 60 TABLET | Refills: 0 | Status: SHIPPED | OUTPATIENT
Start: 2017-06-30 | End: 2017-07-27 | Stop reason: SDUPTHER

## 2017-06-30 RX ORDER — TIZANIDINE 4 MG/1
4 TABLET ORAL EVERY 6 HOURS PRN
Qty: 120 TABLET | Refills: 2 | Status: SHIPPED | OUTPATIENT
Start: 2017-06-30 | End: 2017-09-22 | Stop reason: SDUPTHER

## 2017-06-30 RX ORDER — LORAZEPAM 0.5 MG/1
0.5 TABLET ORAL 2 TIMES DAILY PRN
Qty: 60 TABLET | Refills: 2 | Status: SHIPPED | OUTPATIENT
Start: 2017-06-30 | End: 2017-07-27 | Stop reason: SDUPTHER

## 2017-06-30 NOTE — PROGRESS NOTES
Elizabeth Lopez is a 51 y.o. female.     Back Pain   This is a chronic problem. The current episode started more than 1 year ago. The problem occurs constantly. The problem has been gradually worsening since onset. The pain is present in the gluteal, sacro-iliac and thoracic spine. The quality of the pain is described as aching and shooting. The pain is at a severity of 5/10. The pain is moderate. The pain is the same all the time. The symptoms are aggravated by bending, position, standing and twisting. Stiffness is present in the morning and at night. Associated symptoms include headaches, leg pain and paresthesias. Pertinent negatives include no abdominal pain, chest pain, dysuria, fever, pelvic pain, perianal numbness or weight loss. Risk factors include history of osteoporosis, menopause and poor posture. Treatments tried: She has completed PT and hasn't been getting worse.  She has been taking about 1-2 pain medications a day. The treatment provided mild relief.   Anxiety   Presents for follow-up visit. Symptoms include excessive worry, insomnia, irritability and nervous/anxious behavior. Patient reports no chest pain, compulsions, confusion, decreased concentration, depressed mood, dizziness, dry mouth, feeling of choking, hyperventilation, impotence, malaise, muscle tension, nausea, obsessions, palpitations, panic, restlessness, shortness of breath or suicidal ideas.       She says that when she did physical therapy and that helped only a little. She says that her hips were out of line. She said that she needed some shoe inserts.      She was seen in the ed because she was attacked by a pitbull mix. She has been on abx and it is no draining.  She has a large hematoma on her left thigh from the bite.  Her neighbors dog attacked her. He had chased her in the past.  She hasn't had any fevers or chills, but her stress is a lot higher since she was attacked.        Current Outpatient Prescriptions:   •   amoxicillin-clavulanate (AUGMENTIN) 875-125 MG per tablet, Take 1 tablet by mouth 2 (Two) Times a Day., Disp: 20 tablet, Rfl: 0  •  aspirin 81 MG chewable tablet, Chew 81 mg daily., Disp: , Rfl:   •  calcium carbonate 1250 MG capsule, Take 600 mg by mouth., Disp: , Rfl:   •  EASY TOUCH PEN NEEDLES 31G X 5 MM misc, USE AS DIRECTED., Disp: 50 each, Rfl: 11  •  escitalopram (LEXAPRO) 10 MG tablet, Take 1 tablet by mouth Daily., Disp: 30 tablet, Rfl: 2  •  HYDROcodone-acetaminophen (NORCO) 5-325 MG per tablet, Take 1 tablet by mouth Every 6 (Six) Hours As Needed for Moderate Pain (4-6)., Disp: 40 tablet, Rfl: 0  •  LORazepam (ATIVAN) 0.5 MG tablet, Take 1 tablet by mouth 2 (Two) Times a Day As Needed for Anxiety., Disp: 60 tablet, Rfl: 2  •  methIMAzole (TAPAZOLE) 5 MG tablet, One tab po daily, Disp: 30 tablet, Rfl: 11  •  metoprolol succinate XL (TOPROL XL) 25 MG 24 hr tablet, Take 12.5 mg by mouth daily., Disp: , Rfl:   •  Multiple Vitamins-Minerals (MULTIVITAMIN PO), Take 1 tablet by mouth daily., Disp: , Rfl:   •  naproxen (NAPROSYN) 375 MG tablet, Take 1 tablet by mouth 2 (Two) Times a Day As Needed for mild pain (1-3)., Disp: 30 tablet, Rfl: 1  •  Omega-3 Fatty Acids (FISH OIL CONCENTRATE) 1000 MG capsule, Take 1 capsule by mouth 2 (two) times a day., Disp: , Rfl:   •  omeprazole (priLOSEC) 20 MG capsule, Take 1 capsule by mouth Daily., Disp: 30 capsule, Rfl: 5  •  teriparatide (FORTEO) 600 MCG/2.4ML injection, Inject 0.08 mL under the skin Daily., Disp: 2.4 mL, Rfl: 3  •  tiZANidine (ZANAFLEX) 2 MG tablet, Take 1 tablet by mouth Every 8 (Eight) Hours As Needed for muscle spasms., Disp: 30 tablet, Rfl: 2  •  vitamin E 400 UNIT capsule, Take 400 Units by mouth daily., Disp: , Rfl:     The following portions of the patient's history were reviewed and updated as appropriate: allergies, current medications, past family history, past medical history, past social history, past surgical history and problem  "list.    Review of Systems   Constitutional: Positive for activity change, appetite change, fatigue and irritability. Negative for fever, unexpected weight change and weight loss.   Eyes: Negative for visual disturbance.   Respiratory: Positive for chest tightness. Negative for cough, shortness of breath and wheezing.    Cardiovascular: Negative for chest pain, palpitations and leg swelling.   Gastrointestinal: Negative for abdominal distention, abdominal pain, constipation, diarrhea, nausea and vomiting.   Genitourinary: Negative for dysuria, impotence and pelvic pain.   Musculoskeletal: Positive for arthralgias, back pain, gait problem, neck pain and neck stiffness.   Neurological: Positive for headaches and paresthesias. Negative for dizziness.   Psychiatric/Behavioral: Positive for agitation and dysphoric mood. Negative for confusion, decreased concentration, sleep disturbance and suicidal ideas. The patient is nervous/anxious and has insomnia.        Objective    Vitals:    06/30/17 0956   BP: 126/76   Weight: 139 lb (63 kg)   Height: 65\" (165.1 cm)       Physical Exam   Constitutional: She is oriented to person, place, and time. She appears well-developed and well-nourished. No distress.   Cardiovascular: Normal rate, regular rhythm and normal heart sounds.    No murmur heard.  No LE edema.   Pulmonary/Chest: Effort normal and breath sounds normal. No respiratory distress.   Abdominal: Soft. Bowel sounds are normal. She exhibits no distension. There is no tenderness.   Musculoskeletal:        Cervical back: She exhibits tenderness and spasm. She exhibits normal range of motion, no bony tenderness, no deformity and no laceration.        Lumbar back: She exhibits decreased range of motion, tenderness, pain and spasm. She exhibits no deformity.   Neurological: She is alert and oriented to person, place, and time.   Psychiatric: She has a normal mood and affect. Her behavior is normal. Judgment and thought content " normal.   Nursing note and vitals reviewed.      Assessment/Plan   Problems Addressed this Visit        Nervous and Auditory    Lumbar back pain - Primary    Relevant Orders    MRI Lumbar Spine Without Contrast       Other    Generalized anxiety disorder    Relevant Medications    LORazepam (ATIVAN) 0.5 MG tablet      Other Visit Diagnoses     Bilateral thoracic back pain, unspecified chronicity        Relevant Orders    Ambulatory Referral to Pain Management    MRI Lumbar Spine Without Contrast    Dog bite, subsequent encounter        High risk medication use        Relevant Orders    ToxASSURE Select 13 (MW)        1.) Back Pain-  Will continue medication for now especially with her bite and the pain that she is having pain from that.  UDS today. The patient has read and signed the [facility] Controlled Substance Contract.  I will continue to see patient for regular follow up appointments.  They are well controlled on their medication.  BRENDAN has been reviewed by me and is updated every 3 months. The patient is aware of the potential for addiction and dependence.  MRI of lumbar spine ordered. I think that her thoracic pain is spasm from anxiety and low back pain.  2.) YOANA- Doing well on current medications.  Will continue for now. Not good time to try to lower the Ativan will continue to monitor.  3.) Dog Bite-  Appears to be healing well. Has a large hematoma on her left thigh. No drainage. Continue abx that she has.  RTC in 1 month or sooner PRN

## 2017-07-09 LAB — CONV REPORT SUMMARY: NORMAL

## 2017-07-11 ENCOUNTER — HOSPITAL ENCOUNTER (OUTPATIENT)
Dept: MRI IMAGING | Facility: HOSPITAL | Age: 52
Discharge: HOME | End: 2017-07-11
Admitting: FAMILY MEDICINE

## 2017-07-11 DIAGNOSIS — G89.29 CHRONIC BILATERAL LOW BACK PAIN WITH LEFT-SIDED SCIATICA: ICD-10-CM

## 2017-07-11 DIAGNOSIS — M54.6 BILATERAL THORACIC BACK PAIN, UNSPECIFIED CHRONICITY: ICD-10-CM

## 2017-07-11 DIAGNOSIS — M54.42 CHRONIC BILATERAL LOW BACK PAIN WITH LEFT-SIDED SCIATICA: ICD-10-CM

## 2017-07-11 PROCEDURE — 72148 MRI LUMBAR SPINE W/O DYE: CPT

## 2017-07-12 DIAGNOSIS — F41.1 GENERALIZED ANXIETY DISORDER: Primary | ICD-10-CM

## 2017-07-13 DIAGNOSIS — G89.29 CHRONIC BILATERAL LOW BACK PAIN, WITH SCIATICA PRESENCE UNSPECIFIED: Primary | ICD-10-CM

## 2017-07-13 DIAGNOSIS — M54.5 CHRONIC BILATERAL LOW BACK PAIN, WITH SCIATICA PRESENCE UNSPECIFIED: Primary | ICD-10-CM

## 2017-07-21 ENCOUNTER — TELEPHONE (OUTPATIENT)
Dept: FAMILY MEDICINE CLINIC | Facility: CLINIC | Age: 52
End: 2017-07-21

## 2017-07-21 NOTE — TELEPHONE ENCOUNTER
JEANA POST WAS REF TO A [facility] FOR THERAPY BUT SHE WAS WANTING TO STAY HERE IN [facility]..SHE WAS CALLED ABOUT A THERAPIST APPT BUT IT WAS FOR HOP'LL...PLEASE REF HER TO SOMEONE LOCAL..

## 2017-07-27 ENCOUNTER — OFFICE VISIT (OUTPATIENT)
Dept: FAMILY MEDICINE CLINIC | Facility: CLINIC | Age: 52
End: 2017-07-27

## 2017-07-27 VITALS
DIASTOLIC BLOOD PRESSURE: 74 MMHG | WEIGHT: 142 LBS | SYSTOLIC BLOOD PRESSURE: 122 MMHG | BODY MASS INDEX: 23.66 KG/M2 | HEIGHT: 65 IN

## 2017-07-27 DIAGNOSIS — M54.32 SCIATICA OF LEFT SIDE: ICD-10-CM

## 2017-07-27 DIAGNOSIS — F41.1 GENERALIZED ANXIETY DISORDER: Primary | ICD-10-CM

## 2017-07-27 PROCEDURE — 99214 OFFICE O/P EST MOD 30 MIN: CPT | Performed by: FAMILY MEDICINE

## 2017-07-27 RX ORDER — FLUTICASONE PROPIONATE 50 MCG
2 SPRAY, SUSPENSION (ML) NASAL DAILY
Qty: 1 EACH | Refills: 0 | Status: SHIPPED | OUTPATIENT
Start: 2017-07-27 | End: 2017-08-26

## 2017-07-27 RX ORDER — HYDROCODONE BITARTRATE AND ACETAMINOPHEN 5; 325 MG/1; MG/1
1 TABLET ORAL 2 TIMES DAILY PRN
Qty: 60 TABLET | Refills: 0 | Status: SHIPPED | OUTPATIENT
Start: 2017-07-27 | End: 2017-08-25 | Stop reason: SDUPTHER

## 2017-07-27 RX ORDER — LIDOCAINE 50 MG/G
OINTMENT TOPICAL
Qty: 50 G | Refills: 1 | Status: SHIPPED | OUTPATIENT
Start: 2017-07-27 | End: 2018-10-28

## 2017-07-27 RX ORDER — LORAZEPAM 0.5 MG/1
0.5 TABLET ORAL 2 TIMES DAILY PRN
Qty: 60 TABLET | Refills: 2 | Status: SHIPPED | OUTPATIENT
Start: 2017-07-27 | End: 2018-01-23 | Stop reason: SDUPTHER

## 2017-07-27 RX ORDER — ESCITALOPRAM OXALATE 20 MG/1
20 TABLET ORAL DAILY
Qty: 30 TABLET | Refills: 3 | Status: SHIPPED | OUTPATIENT
Start: 2017-07-27 | End: 2017-08-07

## 2017-07-27 NOTE — PROGRESS NOTES
Elizabeth Lopez is a 51 y.o. female.     Anxiety   Presents for follow-up visit. Symptoms include excessive worry, irritability, muscle tension, nervous/anxious behavior and restlessness. Patient reports no chest pain, compulsions, confusion, decreased concentration, depressed mood, dizziness, dry mouth, feeling of choking, hyperventilation, impotence, insomnia, malaise, nausea, obsessions, palpitations, panic, shortness of breath or suicidal ideas. Symptoms occur constantly. The severity of symptoms is severe and causing significant distress. The quality of sleep is poor. Nighttime awakenings: several.     Compliance with medications is %.   Back Pain   This is a recurrent problem. The current episode started more than 1 month ago. The problem occurs daily. The problem has been waxing and waning since onset. The pain is present in the lumbar spine. The quality of the pain is described as shooting. The pain radiates to the left thigh. The pain is at a severity of 6/10. The pain is moderate. The pain is the same all the time. The symptoms are aggravated by stress. Associated symptoms include leg pain and weakness. Pertinent negatives include no abdominal pain, bladder incontinence, bowel incontinence, chest pain, dysuria, fever, headaches, numbness, paresis, paresthesias, pelvic pain, perianal numbness, tingling or weight loss. Risk factors include sedentary lifestyle, poor posture and menopause. She has tried NSAIDs, muscle relaxant, heat, ice, home exercises and analgesics for the symptoms. The treatment provided mild relief.        Current Outpatient Prescriptions:   •  aspirin 81 MG chewable tablet, Chew 81 mg daily., Disp: , Rfl:   •  calcium carbonate 1250 MG capsule, Take 600 mg by mouth., Disp: , Rfl:   •  EASY TOUCH PEN NEEDLES 31G X 5 MM misc, USE AS DIRECTED., Disp: 50 each, Rfl: 11  •  escitalopram (LEXAPRO) 10 MG tablet, Take 1 tablet by mouth Daily., Disp: 30 tablet, Rfl: 2  •  FORTEO  600 MCG/2.4ML injection, INJECT 2.4ML SUBQ EVERY DAY AS DIRECTED, Disp: 2.4 mL, Rfl: 3  •  HYDROcodone-acetaminophen (NORCO) 5-325 MG per tablet, Take 1 tablet by mouth 2 (Two) Times a Day As Needed for Moderate Pain (4-6)., Disp: 60 tablet, Rfl: 0  •  LORazepam (ATIVAN) 0.5 MG tablet, Take 1 tablet by mouth 2 (Two) Times a Day As Needed for Anxiety., Disp: 60 tablet, Rfl: 2  •  methIMAzole (TAPAZOLE) 5 MG tablet, One tab po daily, Disp: 30 tablet, Rfl: 11  •  metoprolol succinate XL (TOPROL XL) 25 MG 24 hr tablet, Take 12.5 mg by mouth daily., Disp: , Rfl:   •  Multiple Vitamins-Minerals (MULTIVITAMIN PO), Take 1 tablet by mouth daily., Disp: , Rfl:   •  naproxen (NAPROSYN) 375 MG tablet, Take 1 tablet by mouth 2 (Two) Times a Day As Needed for mild pain (1-3)., Disp: 30 tablet, Rfl: 1  •  Omega-3 Fatty Acids (FISH OIL CONCENTRATE) 1000 MG capsule, Take 1 capsule by mouth 2 (two) times a day., Disp: , Rfl:   •  omeprazole (priLOSEC) 20 MG capsule, Take 1 capsule by mouth Daily., Disp: 30 capsule, Rfl: 5  •  teriparatide (FORTEO) 600 MCG/2.4ML injection, Inject 0.08 mL under the skin Daily., Disp: 2.4 mL, Rfl: 3  •  tiZANidine (ZANAFLEX) 4 MG tablet, Take 1 tablet by mouth Every 6 (Six) Hours As Needed for Muscle Spasms., Disp: 120 tablet, Rfl: 2  •  vitamin E 400 UNIT capsule, Take 400 Units by mouth daily., Disp: , Rfl:     The following portions of the patient's history were reviewed and updated as appropriate: allergies, current medications, past family history, past medical history, past social history, past surgical history and problem list.    Review of Systems   Constitutional: Positive for activity change, appetite change, fatigue and irritability. Negative for fever, unexpected weight change and weight loss.   Eyes: Negative for visual disturbance.   Respiratory: Positive for chest tightness. Negative for cough, shortness of breath and wheezing.    Cardiovascular: Negative for chest pain, palpitations and  "leg swelling.   Gastrointestinal: Negative for abdominal distention, abdominal pain, bowel incontinence, constipation, diarrhea, nausea and vomiting.   Genitourinary: Negative for bladder incontinence, dysuria, impotence and pelvic pain.   Musculoskeletal: Positive for arthralgias, back pain, gait problem, neck pain and neck stiffness.   Neurological: Positive for weakness. Negative for dizziness, tingling, numbness, headaches and paresthesias.   Psychiatric/Behavioral: Positive for agitation and dysphoric mood. Negative for confusion, decreased concentration, sleep disturbance and suicidal ideas. The patient is nervous/anxious. The patient does not have insomnia.        Objective    Vitals:    07/27/17 1405   BP: 122/74   Weight: 142 lb (64.4 kg)   Height: 65\" (165.1 cm)       Physical Exam   Constitutional: She is oriented to person, place, and time. She appears well-developed and well-nourished. No distress.   Cardiovascular: Normal rate, regular rhythm and normal heart sounds.    No murmur heard.  No LE edema.   Pulmonary/Chest: Effort normal and breath sounds normal. No respiratory distress.   Abdominal: Soft. Bowel sounds are normal. She exhibits no distension. There is no tenderness.   Musculoskeletal:        Cervical back: She exhibits tenderness and spasm. She exhibits normal range of motion, no bony tenderness, no deformity and no laceration.        Lumbar back: She exhibits decreased range of motion, tenderness, pain and spasm. She exhibits no deformity.   Neurological: She is alert and oriented to person, place, and time.   Psychiatric: She has a normal mood and affect. Her behavior is normal. Judgment and thought content normal.   Nursing note and vitals reviewed.      Assessment/Plan   Problems Addressed this Visit        Other    Generalized anxiety disorder - Primary    Relevant Medications    escitalopram (LEXAPRO) 20 MG tablet    LORazepam (ATIVAN) 0.5 MG tablet      Other Visit Diagnoses     " Sciatica of left side        Relevant Medications    HYDROcodone-acetaminophen (NORCO) 5-325 MG per tablet    lidocaine (XYLOCAINE) 5 % ointment      1.) YOANA-  Will increase her lexapro. Spent time discussing some of the things that have been upsetting her. She has been trying to be there for her daughter and she has been taking care of her the best she can. Stressed that she needs to take care of herself.  Refiled her ativan. UDS in chart reviewed. She has appointment with counseling, but they weren't able to get her in until September. Gave her information about the [facility] walk in [place] and she will try to go tomorrow.  2.) Sciatica- Continue current medications for now. She has an appointment with Pain Management coming up. I think that a lot of her pain is stress related and worsened with muscle tension.  Will try some topical lidocaine and see if that helps also.  A total of 30 minutes was spent with direct patient contact today.  RTC in 1-2 months or sooner PRN

## 2017-08-07 RX ORDER — ESCITALOPRAM OXALATE 10 MG/1
10 TABLET ORAL DAILY
Qty: 30 TABLET | Refills: 2 | Status: SHIPPED | OUTPATIENT
Start: 2017-08-07 | End: 2017-09-21 | Stop reason: SDUPTHER

## 2017-08-25 ENCOUNTER — OFFICE VISIT (OUTPATIENT)
Dept: FAMILY MEDICINE CLINIC | Facility: CLINIC | Age: 52
End: 2017-08-25

## 2017-08-25 VITALS
HEIGHT: 65 IN | BODY MASS INDEX: 24.22 KG/M2 | DIASTOLIC BLOOD PRESSURE: 64 MMHG | SYSTOLIC BLOOD PRESSURE: 100 MMHG | WEIGHT: 145.4 LBS

## 2017-08-25 DIAGNOSIS — M54.32 SCIATICA OF LEFT SIDE: ICD-10-CM

## 2017-08-25 DIAGNOSIS — M54.40 BILATERAL LOW BACK PAIN WITH SCIATICA, SCIATICA LATERALITY UNSPECIFIED, UNSPECIFIED CHRONICITY: Primary | ICD-10-CM

## 2017-08-25 DIAGNOSIS — F41.1 GENERALIZED ANXIETY DISORDER: ICD-10-CM

## 2017-08-25 PROCEDURE — 99213 OFFICE O/P EST LOW 20 MIN: CPT | Performed by: FAMILY MEDICINE

## 2017-08-25 RX ORDER — HYDROCODONE BITARTRATE AND ACETAMINOPHEN 5; 325 MG/1; MG/1
1 TABLET ORAL 2 TIMES DAILY PRN
Qty: 30 TABLET | Refills: 0 | OUTPATIENT
Start: 2017-08-25 | End: 2021-06-03

## 2017-08-25 NOTE — PROGRESS NOTES
Subjective   Nerissa Lopez is a 51 y.o. female.     History of Present Illness   Ms. Lopez is a 50yo female that presents today for follow-up after seeing the back specialist. He says that she needs to have surgery, but they are unable to do anything about it because of her osteoporosis.  She is on Forteo for her osteoporosis.  He doesn't think that she will do well with surgery.  She has been on norco and that has been helping with her pain.  She has tried PT recently and that didn't help. She has been doing exercises at home.    She had some improvement in her osteoporosis already.  She has only been on it since 6/16.  She hasn't had any issues with the medication.    She says that her anxiety has been better. She has been on the lexapro but didn't tolerate the 20MG dose.  She is okay on the 10MG.  She has been taking Ativan once a day.  Takes a half at a time, but has been needing it less recently.      Current Outpatient Prescriptions:   •  aspirin 81 MG chewable tablet, Chew 81 mg daily., Disp: , Rfl:   •  calcium carbonate 1250 MG capsule, Take 600 mg by mouth., Disp: , Rfl:   •  EASY TOUCH PEN NEEDLES 31G X 5 MM misc, USE AS DIRECTED., Disp: 50 each, Rfl: 11  •  escitalopram (LEXAPRO) 10 MG tablet, Take 1 tablet by mouth Daily., Disp: 30 tablet, Rfl: 2  •  fluticasone (FLONASE) 50 MCG/ACT nasal spray, 2 sprays into each nostril Daily for 30 days., Disp: 1 each, Rfl: 0  •  FORTEO 600 MCG/2.4ML injection, INJECT 2.4ML SUBQ EVERY DAY AS DIRECTED, Disp: 2.4 mL, Rfl: 3  •  HYDROcodone-acetaminophen (NORCO) 5-325 MG per tablet, Take 1 tablet by mouth 2 (Two) Times a Day As Needed for Moderate Pain (4-6)., Disp: 60 tablet, Rfl: 0  •  lidocaine (XYLOCAINE) 5 % ointment, Apply  topically Every 2 (Two) Hours As Needed for Mild Pain (1-3)., Disp: 50 g, Rfl: 1  •  LORazepam (ATIVAN) 0.5 MG tablet, Take 1 tablet by mouth 2 (Two) Times a Day As Needed for Anxiety., Disp: 60 tablet, Rfl: 2  •  methIMAzole (TAPAZOLE) 5 MG  tablet, One tab po daily, Disp: 30 tablet, Rfl: 11  •  metoprolol succinate XL (TOPROL XL) 25 MG 24 hr tablet, Take 12.5 mg by mouth daily., Disp: , Rfl:   •  Multiple Vitamins-Minerals (MULTIVITAMIN PO), Take 1 tablet by mouth daily., Disp: , Rfl:   •  naproxen (NAPROSYN) 375 MG tablet, Take 1 tablet by mouth 2 (Two) Times a Day As Needed for mild pain (1-3)., Disp: 30 tablet, Rfl: 1  •  Omega-3 Fatty Acids (FISH OIL CONCENTRATE) 1000 MG capsule, Take 1 capsule by mouth 2 (two) times a day., Disp: , Rfl:   •  omeprazole (priLOSEC) 20 MG capsule, Take 1 capsule by mouth Daily., Disp: 30 capsule, Rfl: 5  •  teriparatide (FORTEO) 600 MCG/2.4ML injection, Inject 0.08 mL under the skin Daily., Disp: 2.4 mL, Rfl: 3  •  tiZANidine (ZANAFLEX) 4 MG tablet, Take 1 tablet by mouth Every 6 (Six) Hours As Needed for Muscle Spasms., Disp: 120 tablet, Rfl: 2  •  vitamin E 400 UNIT capsule, Take 400 Units by mouth daily., Disp: , Rfl:     The following portions of the patient's history were reviewed and updated as appropriate: allergies, current medications, past family history, past medical history, past social history, past surgical history and problem list.    Review of Systems   Constitutional: Positive for fatigue. Negative for activity change, appetite change, fever and unexpected weight change.   Eyes: Negative for visual disturbance.   Respiratory: Negative for cough, chest tightness, shortness of breath and wheezing.    Cardiovascular: Negative for chest pain, palpitations and leg swelling.   Gastrointestinal: Negative for abdominal distention, abdominal pain, constipation, diarrhea, nausea and vomiting.   Genitourinary: Negative for dysuria and pelvic pain.   Musculoskeletal: Positive for arthralgias, back pain, gait problem, neck pain and neck stiffness.   Neurological: Positive for weakness. Negative for dizziness, numbness and headaches.   Psychiatric/Behavioral: Positive for dysphoric mood. Negative for agitation,  "confusion, decreased concentration, sleep disturbance and suicidal ideas. The patient is nervous/anxious.        Objective    Vitals:    08/25/17 1226   BP: 100/64   Weight: 145 lb 6.4 oz (66 kg)   Height: 65\" (165.1 cm)       Physical Exam   Constitutional: She is oriented to person, place, and time. She appears well-developed and well-nourished. No distress.   Cardiovascular: Normal rate, regular rhythm and normal heart sounds.    No murmur heard.  No LE edema.   Pulmonary/Chest: Effort normal and breath sounds normal. No respiratory distress.   Abdominal: Soft. Bowel sounds are normal. She exhibits no distension. There is no tenderness.   Musculoskeletal:        Cervical back: She exhibits tenderness and spasm. She exhibits normal range of motion, no bony tenderness, no deformity and no laceration.        Lumbar back: She exhibits decreased range of motion, tenderness, pain and spasm. She exhibits no deformity.   Neurological: She is alert and oriented to person, place, and time.   Psychiatric: She has a normal mood and affect. Her behavior is normal. Judgment and thought content normal.   Nursing note and vitals reviewed.      Assessment/Plan   She has an appointment with Pain Management. We need to continue her Forteo. Hoping that her bone density will improve and then she would be a candidate for surgery.  Now likely wouldn't be successful because he didn't think that the hardware that she needed would be stable.  Will continue low dose pain medication for now.  May benefit from injections and continue exercises at home.  UDS in chart reviewed. The patient has read and signed the [facility] Controlled Substance Contract.  I will continue to see patient for regular follow up appointments.  They are well controlled on their medication.  BRENDAN has been reviewed by me and is updated every 3 months. The patient is aware of the potential for addiction and dependence.  Gave her enough pain medication to get to " her appointment and not a full month supply.  Discussed that taking benzos with pain medication is high risk. She plans to start to wean off them. She feels that her lexapro is helping and she has an appointment with a counselor and feels that will help also.    RTC in 1 month or sooner PRN

## 2017-09-06 ENCOUNTER — OFFICE VISIT (OUTPATIENT)
Dept: PAIN MEDICINE | Facility: CLINIC | Age: 52
End: 2017-09-06

## 2017-09-06 VITALS
DIASTOLIC BLOOD PRESSURE: 70 MMHG | WEIGHT: 141.9 LBS | HEIGHT: 65 IN | SYSTOLIC BLOOD PRESSURE: 120 MMHG | BODY MASS INDEX: 23.64 KG/M2

## 2017-09-06 DIAGNOSIS — Z79.899 HIGH RISK MEDICATIONS (NOT ANTICOAGULANTS) LONG-TERM USE: ICD-10-CM

## 2017-09-06 DIAGNOSIS — M47.817 LUMBOSACRAL SPONDYLOSIS WITHOUT MYELOPATHY: Primary | ICD-10-CM

## 2017-09-06 PROCEDURE — 99215 OFFICE O/P EST HI 40 MIN: CPT | Performed by: NURSE PRACTITIONER

## 2017-09-06 RX ORDER — HYDROCODONE BITARTRATE AND ACETAMINOPHEN 5; 325 MG/1; MG/1
1 TABLET ORAL 2 TIMES DAILY
Qty: 60 TABLET | Refills: 0 | Status: SHIPPED | OUTPATIENT
Start: 2017-09-06 | End: 2017-10-06

## 2017-09-06 NOTE — PROGRESS NOTES
Nerissa Lopez is a 51 y.o. female.   1965    HPI:   Location: lower back  Quality: shooting, aching, sharp and dull  Severity: 6/10  Timing: constant  Alleviating: pain medication  Aggravating: increased activity     Patient has had low back pain for greater than 5 years.  She denies any specific injury that started this.  She has never been to pain management.  She's never been to a chiropractor.  She been to physical therapy which did not help.  She has an MRI which does show bilateral pars defects with anterolisthesis of L5 on S1.  She reports that Dr. Marroquin in [place] has suggested surgery however he feels though she needs treatment for her osteoporosis first.  She states she has been on Forteo for about a year and has had an increase her bone density.  In the meantime she's been taking opioid medication at a relatively low dose.  She takes Norco 5 by mouth twice a day.  This helps her perform activities of daily living better.  She denies side effects of her medication.  She has a history of anxiety and reports receiving counseling for this.  She has a history of depression as well however this is relatively controlled she feels.      The following portions of the patient's history were reviewed by me and updated as appropriate: allergies, current medications, past family history, past medical history, past social history, past surgical history and problem list.    Past Medical History:   Diagnosis Date   • Anal fistula    • Anemia    • Astigmatism    • Broken heart syndrome 03/02/2016    when mother passed away   • Chronic pain disorder    • Death of family member     Mother passes away.   • Generalized anxiety disorder    • GERD (gastroesophageal reflux disease)    • Goiter    • Graves disease    • Heartburn    • Hematuria    • Hyperthyroidism    • Lesion of eyelid     LLL   • Low back pain    • Lumbosacral spondylosis    • Menopausal syndrome    • Myopia    • Osteoporosis    • Pain in back     Lumbar  region   • Pain in joint involving left lower leg    • Pain in wrist    • Panic attack    • Presbyopia    • Right upper quadrant pain    • Takotsubo cardiomyopathy    • Thoracic spondylosis        Social History     Social History   • Marital status:      Spouse name: N/A   • Number of children: N/A   • Years of education: N/A     Occupational History   • Not on file.     Social History Main Topics   • Smoking status: Never Smoker   • Smokeless tobacco: Never Used   • Alcohol use No   • Drug use: No   • Sexual activity: Defer     Other Topics Concern   • Not on file     Social History Narrative       Family History   Problem Relation Age of Onset   • Heart disease Mother    • Hyperlipidemia Mother    • Hypertension Mother    • Osteoporosis Mother    • Cancer Father    • Diabetes Sister    • Thyroid disease Sister    • COPD Sister    • Hypertension Sister    • Migraines Sister    • Diabetes Brother    • COPD Brother    • Cancer Maternal Grandmother      Ovarian Cancer   • Breast cancer Other    • Cancer Other    • Other Other      Gall Bladder disease   • Breast cancer Other    • Breast cancer Maternal Aunt          Current Outpatient Prescriptions:   •  aspirin 81 MG chewable tablet, Chew 81 mg daily., Disp: , Rfl:   •  calcium carbonate 1250 MG capsule, Take 600 mg by mouth., Disp: , Rfl:   •  EASY TOUCH PEN NEEDLES 31G X 5 MM misc, USE AS DIRECTED., Disp: 50 each, Rfl: 11  •  escitalopram (LEXAPRO) 10 MG tablet, Take 1 tablet by mouth Daily., Disp: 30 tablet, Rfl: 2  •  FORTEO 600 MCG/2.4ML injection, INJECT 2.4ML SUBQ EVERY DAY AS DIRECTED, Disp: 2.4 mL, Rfl: 3  •  HYDROcodone-acetaminophen (NORCO) 5-325 MG per tablet, Take 1 tablet by mouth 2 (Two) Times a Day As Needed for Moderate Pain ., Disp: 30 tablet, Rfl: 0  •  lidocaine (XYLOCAINE) 5 % ointment, Apply  topically Every 2 (Two) Hours As Needed for Mild Pain (1-3)., Disp: 50 g, Rfl: 1  •  LORazepam (ATIVAN) 0.5 MG tablet, Take 1 tablet by mouth 2  (Two) Times a Day As Needed for Anxiety., Disp: 60 tablet, Rfl: 2  •  methIMAzole (TAPAZOLE) 5 MG tablet, One tab po daily, Disp: 30 tablet, Rfl: 11  •  metoprolol succinate XL (TOPROL XL) 25 MG 24 hr tablet, Take 12.5 mg by mouth daily., Disp: , Rfl:   •  Multiple Vitamins-Minerals (MULTIVITAMIN PO), Take 1 tablet by mouth daily., Disp: , Rfl:   •  naproxen (NAPROSYN) 375 MG tablet, Take 1 tablet by mouth 2 (Two) Times a Day As Needed for mild pain (1-3)., Disp: 30 tablet, Rfl: 1  •  Omega-3 Fatty Acids (FISH OIL CONCENTRATE) 1000 MG capsule, Take 1 capsule by mouth 2 (two) times a day., Disp: , Rfl:   •  omeprazole (priLOSEC) 20 MG capsule, Take 1 capsule by mouth Daily., Disp: 30 capsule, Rfl: 5  •  teriparatide (FORTEO) 600 MCG/2.4ML injection, Inject 0.08 mL under the skin Daily., Disp: 2.4 mL, Rfl: 3  •  tiZANidine (ZANAFLEX) 4 MG tablet, Take 1 tablet by mouth Every 6 (Six) Hours As Needed for Muscle Spasms., Disp: 120 tablet, Rfl: 2  •  vitamin E 400 UNIT capsule, Take 400 Units by mouth daily., Disp: , Rfl:   •  HYDROcodone-acetaminophen (NORCO) 5-325 MG per tablet, Take 1 tablet by mouth 2 (Two) Times a Day for 30 days., Disp: 60 tablet, Rfl: 0  •  HYDROcodone-acetaminophen (NORCO) 5-325 MG per tablet, Take 1 tablet by mouth 2 (Two) Times a Day for 30 days., Disp: 60 tablet, Rfl: 0    No Known Allergies    Review of Systems   Musculoskeletal: Positive for back pain.        Osteoporosis     Psychiatric/Behavioral: Positive for dysphoric mood. The patient is nervous/anxious.    All other systems reviewed and are negative.    All review of systems reviewed and negative except for above.    Physical Exam   Constitutional: She is oriented to person, place, and time. She appears well-developed and well-nourished. She does not appear ill. No distress.   HENT:   Head: Normocephalic and atraumatic.   Right Ear: Hearing normal.   Left Ear: Hearing normal.   Eyes: Conjunctivae and EOM are normal. Pupils are equal,  round, and reactive to light.   Neck: Normal range of motion and full passive range of motion without pain. Neck supple. No muscular tenderness present. Normal range of motion present.   Cardiovascular: Normal rate, regular rhythm and normal heart sounds.    Pulmonary/Chest: Effort normal and breath sounds normal.   Abdominal: Soft. Bowel sounds are normal. There is no tenderness.   Musculoskeletal:        Lumbar back: She exhibits decreased range of motion (Flexion 45° with extension of 5° bilateral facet loading.).   Neurological: She is alert and oriented to person, place, and time. She has normal strength and normal reflexes. She displays normal reflexes. No cranial nerve deficit or sensory deficit. She exhibits normal muscle tone. Coordination and gait normal.   Skin: Skin is warm and dry. No rash noted. No erythema.   Psychiatric: She has a normal mood and affect. Her behavior is normal. Judgment normal.   Vitals reviewed.      Nerissa was seen today for back pain.    Diagnoses and all orders for this visit:    Lumbosacral spondylosis without myelopathy    High risk medications (not anticoagulants) long-term use    Other orders  -     HYDROcodone-acetaminophen (NORCO) 5-325 MG per tablet; Take 1 tablet by mouth 2 (Two) Times a Day for 30 days.  -     HYDROcodone-acetaminophen (NORCO) 5-325 MG per tablet; Take 1 tablet by mouth 2 (Two) Times a Day for 30 days.        Medication: Patient reports no negative side effects, Patient reports appropriate usage and storage habits, Patient's opioid provides enough reflief to be more active and perform activities of daily living with less discomfort., Refill opioid medication as above and Opioid contract was read and discussed today and the patient chooses to sign.    Interventional: none at this time.  Should she decide to forego surgery, or is not a candidate, we could perform facet interventions and/or epidural steroid injections as necessary.  I would try to limit  the steroid.  Facet interventions could be done without steroid.    Rehab: none at this time.  No improvement with physical therapy in the past.    Behavioral: No aberrant behavior noted. BRENDAN Report #[id number]  was reviewed and is consistent with stated history    Urine drug screen None at this time    ORT: 1    PHQ-9: 0          This document has been electronically signed by Stevan Stroud MD on September 6, 2017 10:44 AM

## 2017-09-21 ENCOUNTER — OFFICE VISIT (OUTPATIENT)
Dept: FAMILY MEDICINE CLINIC | Facility: CLINIC | Age: 52
End: 2017-09-21

## 2017-09-21 VITALS
WEIGHT: 144.2 LBS | BODY MASS INDEX: 24.03 KG/M2 | SYSTOLIC BLOOD PRESSURE: 96 MMHG | DIASTOLIC BLOOD PRESSURE: 68 MMHG | HEIGHT: 65 IN

## 2017-09-21 DIAGNOSIS — R30.0 DYSURIA: ICD-10-CM

## 2017-09-21 DIAGNOSIS — F41.1 GENERALIZED ANXIETY DISORDER: Primary | ICD-10-CM

## 2017-09-21 DIAGNOSIS — Z23 INFLUENZA VACCINE NEEDED: ICD-10-CM

## 2017-09-21 LAB
BILIRUB BLD-MCNC: NEGATIVE MG/DL
CLARITY, POC: CLEAR
COLOR UR: YELLOW
GLUCOSE UR STRIP-MCNC: NEGATIVE MG/DL
KETONES UR QL: NEGATIVE
LEUKOCYTE EST, POC: ABNORMAL
NITRITE UR-MCNC: NEGATIVE MG/ML
PH UR: 8 [PH] (ref 5–8)
PROT UR STRIP-MCNC: NEGATIVE MG/DL
RBC # UR STRIP: ABNORMAL /UL
SP GR UR: 1 (ref 1–1.03)
UROBILINOGEN UR QL: NORMAL

## 2017-09-21 PROCEDURE — 90471 IMMUNIZATION ADMIN: CPT | Performed by: FAMILY MEDICINE

## 2017-09-21 PROCEDURE — 90686 IIV4 VACC NO PRSV 0.5 ML IM: CPT | Performed by: FAMILY MEDICINE

## 2017-09-21 PROCEDURE — 99213 OFFICE O/P EST LOW 20 MIN: CPT | Performed by: FAMILY MEDICINE

## 2017-09-21 RX ORDER — ESCITALOPRAM OXALATE 10 MG/1
10 TABLET ORAL DAILY
Qty: 90 TABLET | Refills: 1 | Status: SHIPPED | OUTPATIENT
Start: 2017-09-21 | End: 2017-11-21 | Stop reason: SDUPTHER

## 2017-09-21 RX ORDER — OMEPRAZOLE 20 MG/1
20 CAPSULE, DELAYED RELEASE ORAL DAILY
Qty: 90 CAPSULE | Refills: 1 | Status: SHIPPED | OUTPATIENT
Start: 2017-09-21 | End: 2018-04-03 | Stop reason: SDUPTHER

## 2017-09-21 NOTE — PROGRESS NOTES
Subjective   Nerissa Lopez is a 51 y.o. female.     History of Present Illness   Ms. Lopez is a 52yo female that presents today for follow-up on her anxiety and depression.  She has seen doctor at [facility] since the last time that she was here. Says that she feels about the same since she was here last time.  She has been dealing with a lot of things at home. She has issues with her daughter and she was attacked by her neighbors dog.  She has been taking lexapro and she didn't tolerate increasing that.  She has been trying to come off her Ativan. She hadn't been taking them that much.  We had discussed the risks of that with the pain medications with the Ativan and so that has made her want to stop taking it.    She has had some dysuria for a couple weeks. Has had some lower abd cramping.  No fevers.  Has had several UTI in the past.      Current Outpatient Prescriptions:   •  aspirin 81 MG chewable tablet, Chew 81 mg daily., Disp: , Rfl:   •  calcium carbonate 1250 MG capsule, Take 600 mg by mouth., Disp: , Rfl:   •  EASY TOUCH PEN NEEDLES 31G X 5 MM misc, USE AS DIRECTED., Disp: 50 each, Rfl: 11  •  escitalopram (LEXAPRO) 10 MG tablet, Take 1 tablet by mouth Daily., Disp: 30 tablet, Rfl: 2  •  FORTEO 600 MCG/2.4ML injection, INJECT 2.4ML SUBQ EVERY DAY AS DIRECTED, Disp: 2.4 mL, Rfl: 3  •  HYDROcodone-acetaminophen (NORCO) 5-325 MG per tablet, Take 1 tablet by mouth 2 (Two) Times a Day As Needed for Moderate Pain ., Disp: 30 tablet, Rfl: 0  •  HYDROcodone-acetaminophen (NORCO) 5-325 MG per tablet, Take 1 tablet by mouth 2 (Two) Times a Day for 30 days., Disp: 60 tablet, Rfl: 0  •  HYDROcodone-acetaminophen (NORCO) 5-325 MG per tablet, Take 1 tablet by mouth 2 (Two) Times a Day for 30 days., Disp: 60 tablet, Rfl: 0  •  lidocaine (XYLOCAINE) 5 % ointment, Apply  topically Every 2 (Two) Hours As Needed for Mild Pain (1-3)., Disp: 50 g, Rfl: 1  •  LORazepam (ATIVAN) 0.5 MG tablet, Take 1 tablet by mouth  2 (Two) Times a Day As Needed for Anxiety., Disp: 60 tablet, Rfl: 2  •  magnesium oxide (MAGOX) 400 (241.3 Mg) MG tablet tablet, Take 800 mg by mouth Daily., Disp: , Rfl:   •  methIMAzole (TAPAZOLE) 5 MG tablet, One tab po daily, Disp: 30 tablet, Rfl: 11  •  metoprolol succinate XL (TOPROL XL) 25 MG 24 hr tablet, Take 12.5 mg by mouth daily., Disp: , Rfl:   •  Multiple Vitamins-Minerals (MULTIVITAMIN PO), Take 1 tablet by mouth daily., Disp: , Rfl:   •  naproxen (NAPROSYN) 375 MG tablet, Take 1 tablet by mouth 2 (Two) Times a Day As Needed for mild pain (1-3)., Disp: 30 tablet, Rfl: 1  •  Omega-3 Fatty Acids (FISH OIL CONCENTRATE) 1000 MG capsule, Take 1 capsule by mouth 2 (two) times a day., Disp: , Rfl:   •  omeprazole (priLOSEC) 20 MG capsule, Take 1 capsule by mouth Daily., Disp: 30 capsule, Rfl: 5  •  tiZANidine (ZANAFLEX) 4 MG tablet, Take 1 tablet by mouth Every 6 (Six) Hours As Needed for Muscle Spasms., Disp: 120 tablet, Rfl: 2  •  vitamin E 400 UNIT capsule, Take 400 Units by mouth daily., Disp: , Rfl:     The following portions of the patient's history were reviewed and updated as appropriate: allergies, current medications, past family history, past medical history, past social history, past surgical history and problem list.    Review of Systems   Constitutional: Positive for fatigue. Negative for activity change, appetite change, fever and unexpected weight change.   Eyes: Negative for visual disturbance.   Respiratory: Negative for cough, chest tightness, shortness of breath and wheezing.    Cardiovascular: Negative for chest pain, palpitations and leg swelling.   Gastrointestinal: Positive for abdominal pain. Negative for abdominal distention, constipation, diarrhea, nausea and vomiting.   Genitourinary: Positive for dysuria. Negative for pelvic pain.   Musculoskeletal: Positive for arthralgias, back pain, gait problem, neck pain and neck stiffness.   Neurological: Positive for weakness. Negative for  "dizziness, numbness and headaches.   Psychiatric/Behavioral: Positive for dysphoric mood. Negative for agitation, confusion, decreased concentration, sleep disturbance and suicidal ideas. The patient is nervous/anxious.        Objective    Vitals:    09/21/17 0800   BP: 96/68   Weight: 144 lb 3.2 oz (65.4 kg)   Height: 65\" (165.1 cm)       Physical Exam   Constitutional: She is oriented to person, place, and time. She appears well-developed and well-nourished. No distress.   Cardiovascular: Normal rate, regular rhythm and normal heart sounds.    No murmur heard.  No LE edema.   Pulmonary/Chest: Effort normal and breath sounds normal. No respiratory distress.   Abdominal: Soft. Bowel sounds are normal. She exhibits no distension. There is tenderness.   Neurological: She is alert and oriented to person, place, and time.   Psychiatric: She has a normal mood and affect. Her behavior is normal. Judgment and thought content normal.   Nursing note and vitals reviewed.      Assessment/Plan   Problems Addressed this Visit        Other    Generalized anxiety disorder - Primary    Relevant Medications    escitalopram (LEXAPRO) 10 MG tablet      Other Visit Diagnoses     Dysuria        Influenza vaccine needed        Relevant Orders    Flu Vaccine Quad PF 3YR+ (FLUARIX/FLUZONE 1403-5728)        Continue current medications.  Continue to try to titrate off the Ativan and she has been doing a good job. UDS in chart reviewed. Doesn't need refill because she hasn't been taking it that often.  The patient has read and signed the [facility] Controlled Substance Contract.  I will continue to see patient for regular follow up appointments.  They are well controlled on their medication.  BRENDAN has been reviewed by me and is updated every 3 months. The patient is aware of the potential for addiction and dependence.  I think that the counseling that she is starting is going to help her more then anything.  I hope that will help her " deal with all the grief that she has.  Flu vaccine given today in the office.  UA negative today in the office.  May be having dysuria secondary to constipation.    RTC in 2 months or sooner PRN

## 2017-09-22 RX ORDER — TIZANIDINE 4 MG/1
4 TABLET ORAL EVERY 6 HOURS PRN
Qty: 120 TABLET | Refills: 2 | Status: SHIPPED | OUTPATIENT
Start: 2017-09-22 | End: 2017-11-25 | Stop reason: SDUPTHER

## 2017-11-01 RX ORDER — ESCITALOPRAM OXALATE 10 MG/1
TABLET ORAL
Qty: 30 TABLET | Refills: 0 | Status: SHIPPED | OUTPATIENT
Start: 2017-11-01 | End: 2018-05-10 | Stop reason: SDUPTHER

## 2017-11-02 ENCOUNTER — OFFICE VISIT (OUTPATIENT)
Dept: PAIN MEDICINE | Facility: CLINIC | Age: 52
End: 2017-11-02

## 2017-11-02 VITALS
SYSTOLIC BLOOD PRESSURE: 102 MMHG | DIASTOLIC BLOOD PRESSURE: 70 MMHG | BODY MASS INDEX: 23.57 KG/M2 | HEIGHT: 65 IN | WEIGHT: 141.5 LBS

## 2017-11-02 DIAGNOSIS — M43.07 LUMBOSACRAL SPONDYLOLYSIS: Primary | ICD-10-CM

## 2017-11-02 DIAGNOSIS — M51.36 DDD (DEGENERATIVE DISC DISEASE), LUMBAR: ICD-10-CM

## 2017-11-02 DIAGNOSIS — Z79.899 HIGH RISK MEDICATIONS (NOT ANTICOAGULANTS) LONG-TERM USE: ICD-10-CM

## 2017-11-02 DIAGNOSIS — M79.18 MYOFACIAL MUSCLE PAIN: ICD-10-CM

## 2017-11-02 DIAGNOSIS — M47.819 FACET ARTHROPATHY: ICD-10-CM

## 2017-11-02 PROCEDURE — 99213 OFFICE O/P EST LOW 20 MIN: CPT | Performed by: NURSE PRACTITIONER

## 2017-11-02 NOTE — PROGRESS NOTES
Nerissa Lopez is a 52 y.o. female.   1965    HPI:   Location: lower back  Quality: shooting, aching, sharp and dull  Severity: 5/10  Timing: constant  Alleviating: pain medication  Aggravating: increased activity      Pt remains with chronic lower back pain. I have read ancillary notes and images. Pt reports opiates control pain- may consider facet interventions. Opiate medications provides enough relief for daily activity and ambulation. NO SE. Pt happy with pain management visit and regimen.               I have reviewed ancillary notes and images for today's examination;      Moderate severity bilateral neural foraminal stenosis at L5-S1  secondary to facet arthrosis and the observed anterior  spondylolisthesis. No evidence of any significant central canal  stenosis. MRI lumbar spine without contrast is otherwise  unremarkable.  IMPRESSION:  Conclusion: 0.8 cm anterior spondylolisthesis of L5 respect to S1  secondary to facet arthrosis and bilateral pars defects,  spondylolysis at L5. Moderate severity bilateral neuroforaminal  stenosis at L5-S1 secondary to facet arthrosis and the observed  anterior spondylolisthesis.    No focal disc protrusion or central canal stenosis. MRI lumbar  spine without contrast is otherwise unremarkable.   7/11/2017 2:08 PM CDT            The following portions of the patient's history were reviewed by me and updated as appropriate: allergies, current medications, past family history, past medical history, past social history, past surgical history and problem list.    Past Medical History:   Diagnosis Date   • Anal fistula    • Anemia    • Astigmatism    • Broken heart syndrome 03/02/2016    when mother passed away   • Chronic pain disorder    • Death of family member     Mother passes away.   • Generalized anxiety disorder    • GERD (gastroesophageal reflux disease)    • Goiter    • Graves disease    • Heartburn    • Hematuria    • Hyperthyroidism    • Lesion of eyelid      LLL   • Low back pain    • Lumbosacral spondylosis    • Menopausal syndrome    • Myopia    • Osteoporosis    • Pain in back     Lumbar region   • Pain in joint involving left lower leg    • Pain in wrist    • Panic attack    • Presbyopia    • Right upper quadrant pain    • Takotsubo cardiomyopathy    • Thoracic spondylosis        Social History     Social History   • Marital status:      Spouse name: N/A   • Number of children: N/A   • Years of education: N/A     Occupational History   • Not on file.     Social History Main Topics   • Smoking status: Never Smoker   • Smokeless tobacco: Never Used   • Alcohol use No   • Drug use: No   • Sexual activity: Defer     Other Topics Concern   • Not on file     Social History Narrative       Family History   Problem Relation Age of Onset   • Heart disease Mother    • Hyperlipidemia Mother    • Hypertension Mother    • Osteoporosis Mother    • Cancer Father    • Diabetes Sister    • Thyroid disease Sister    • COPD Sister    • Hypertension Sister    • Migraines Sister    • Diabetes Brother    • COPD Brother    • Cancer Maternal Grandmother      Ovarian Cancer   • Breast cancer Other    • Cancer Other    • Other Other      Gall Bladder disease   • Breast cancer Other    • Breast cancer Maternal Aunt          Current Outpatient Prescriptions:   •  aspirin 81 MG chewable tablet, Chew 81 mg daily., Disp: , Rfl:   •  azithromycin (ZITHROMAX Z-ISABELLA) 250 MG tablet, Take 2 tablets the first day, then 1 tablet daily for 4 days., Disp: 6 tablet, Rfl: 0  •  calcium carbonate 1250 MG capsule, Take 600 mg by mouth., Disp: , Rfl:   •  EASY TOUCH PEN NEEDLES 31G X 5 MM misc, USE AS DIRECTED., Disp: 50 each, Rfl: 11  •  escitalopram (LEXAPRO) 10 MG tablet, Take 1 tablet by mouth Daily., Disp: 90 tablet, Rfl: 1  •  escitalopram (LEXAPRO) 10 MG tablet, TAKE 1 TABLET BY MOUTH DAILY, Disp: 30 tablet, Rfl: 0  •  fluticasone (FLONASE) 50 MCG/ACT nasal spray, 2 sprays into each nostril Daily  for 14 days., Disp: 1 bottle, Rfl: 0  •  FORTEO 600 MCG/2.4ML injection, INJECT 2.4ML SUBQ EVERY DAY AS DIRECTED, Disp: 2.4 mL, Rfl: 3  •  HYDROcodone-acetaminophen (NORCO) 5-325 MG per tablet, Take 1 tablet by mouth 2 (Two) Times a Day As Needed for Moderate Pain ., Disp: 30 tablet, Rfl: 0  •  lidocaine (XYLOCAINE) 5 % ointment, Apply  topically Every 2 (Two) Hours As Needed for Mild Pain (1-3)., Disp: 50 g, Rfl: 1  •  LORazepam (ATIVAN) 0.5 MG tablet, Take 1 tablet by mouth 2 (Two) Times a Day As Needed for Anxiety., Disp: 60 tablet, Rfl: 2  •  magnesium oxide (MAGOX) 400 (241.3 Mg) MG tablet tablet, Take 800 mg by mouth Daily., Disp: , Rfl:   •  methIMAzole (TAPAZOLE) 5 MG tablet, One tab po daily, Disp: 30 tablet, Rfl: 11  •  MethylPREDNISolone (MEDROL, ISABELLA,) 4 MG tablet, Take as directed on package instructions., Disp: 1 each, Rfl: 0  •  metoprolol succinate XL (TOPROL XL) 25 MG 24 hr tablet, Take 12.5 mg by mouth daily., Disp: , Rfl:   •  Multiple Vitamins-Minerals (MULTIVITAMIN PO), Take 1 tablet by mouth daily., Disp: , Rfl:   •  naproxen (NAPROSYN) 375 MG tablet, Take 1 tablet by mouth 2 (Two) Times a Day As Needed for mild pain (1-3)., Disp: 30 tablet, Rfl: 1  •  Omega-3 Fatty Acids (FISH OIL CONCENTRATE) 1000 MG capsule, Take 1 capsule by mouth 2 (two) times a day., Disp: , Rfl:   •  omeprazole (priLOSEC) 20 MG capsule, Take 1 capsule by mouth Daily., Disp: 90 capsule, Rfl: 1  •  promethazine-dextromethorphan (PROMETHAZINE-DM) 6.25-15 MG/5ML syrup, Take 5 mL by mouth 4 (Four) Times a Day As Needed for Cough., Disp: 118 mL, Rfl: 0  •  tiZANidine (ZANAFLEX) 4 MG tablet, Take 1 tablet by mouth Every 6 (Six) Hours As Needed for Muscle Spasms., Disp: 120 tablet, Rfl: 2  •  vitamin E 400 UNIT capsule, Take 400 Units by mouth daily., Disp: , Rfl:     No Known Allergies    Review of Systems   Musculoskeletal: Positive for back pain (lower).   All other systems reviewed and are negative.    All systems reviewed  and negative except for above.    Physical Exam   Constitutional: She is oriented to person, place, and time. She appears well-developed and well-nourished. She does not appear ill. No distress.   HENT:   Head: Normocephalic and atraumatic.   Right Ear: Hearing normal.   Left Ear: Hearing normal.   Eyes: Conjunctivae and EOM are normal. Pupils are equal, round, and reactive to light.   Neck: Normal range of motion and full passive range of motion without pain. Neck supple. No muscular tenderness present. Normal range of motion present.   Cardiovascular: Normal rate, regular rhythm and normal heart sounds.    Pulmonary/Chest: Effort normal and breath sounds normal.   Abdominal: Soft. Bowel sounds are normal. There is no tenderness.   Musculoskeletal:        Lumbar back: She exhibits decreased range of motion (Flexion < 60 deg and 10 deg ext with minerva facet loading) and tenderness ( midline ttp).   Neurological: She is alert and oriented to person, place, and time. She has normal strength and normal reflexes. She displays normal reflexes. No cranial nerve deficit or sensory deficit. She exhibits normal muscle tone. Coordination and gait normal.   Reflex Scores:       Tricep reflexes are 2+ on the right side and 2+ on the left side.       Bicep reflexes are 2+ on the right side and 2+ on the left side.       Brachioradialis reflexes are 2+ on the right side and 2+ on the left side.       Patellar reflexes are 2+ on the right side and 2+ on the left side.       Achilles reflexes are 2+ on the right side and 2+ on the left side.  Skin: Skin is warm and dry. No rash noted. No erythema.   Psychiatric: She has a normal mood and affect. Her behavior is normal. Judgment normal.   Vitals reviewed.      Nerissa was seen today for back pain.    Diagnoses and all orders for this visit:    Lumbosacral spondylolysis    DDD (degenerative disc disease), lumbar    Facet arthropathy    Myofacial muscle pain    High risk medications (not  anticoagulants) long-term use        Medication: Patient reports no negative side effects, Patient reports appropriate usage and storage habits and Patient's opioid provides enough relief to be more active and perform activities of daily living with less discomfort. Norco 5mg bid. Discussed case with Bimal Stroud, opiate medication refilled ×2 hand written scripts.      Interventional: GUEVARA and any neurological impairments discussed with patient that may need of emergency evaluation.      Rehab: home stretching.     Behavioral: No aberrant behavior noted. BRENDAN Report # [id number]  was reviewed and is consistent with stated history    Urine drug screen None at this time          This document has been electronically signed by YOANA Caballero on November 2, 2017 8:39 AM          This document has been electronically signed by YOANA Caballero on November 2, 2017 8:39 AM

## 2017-11-21 ENCOUNTER — APPOINTMENT (OUTPATIENT)
Dept: LAB | Facility: HOSPITAL | Age: 52
End: 2017-11-21

## 2017-11-21 ENCOUNTER — OFFICE VISIT (OUTPATIENT)
Dept: FAMILY MEDICINE CLINIC | Facility: CLINIC | Age: 52
End: 2017-11-21

## 2017-11-21 VITALS
BODY MASS INDEX: 23.91 KG/M2 | HEIGHT: 65 IN | SYSTOLIC BLOOD PRESSURE: 98 MMHG | DIASTOLIC BLOOD PRESSURE: 68 MMHG | WEIGHT: 143.5 LBS

## 2017-11-21 DIAGNOSIS — M54.42 BILATERAL LOW BACK PAIN WITH BILATERAL SCIATICA, UNSPECIFIED CHRONICITY: ICD-10-CM

## 2017-11-21 DIAGNOSIS — F41.1 GENERALIZED ANXIETY DISORDER: Primary | ICD-10-CM

## 2017-11-21 DIAGNOSIS — M54.41 BILATERAL LOW BACK PAIN WITH BILATERAL SCIATICA, UNSPECIFIED CHRONICITY: ICD-10-CM

## 2017-11-21 LAB
25(OH)D3 SERPL-MCNC: 47.6 NG/ML (ref 30–100)
ALBUMIN SERPL-MCNC: 4.2 G/DL (ref 3.4–4.8)
ALBUMIN/GLOB SERPL: 1.2 G/DL (ref 1.1–1.8)
ALP SERPL-CCNC: 46 U/L (ref 38–126)
ALT SERPL W P-5'-P-CCNC: 34 U/L (ref 9–52)
ANION GAP SERPL CALCULATED.3IONS-SCNC: 10 MMOL/L (ref 5–15)
ARTICHOKE IGE QN: 88 MG/DL (ref 1–129)
AST SERPL-CCNC: 49 U/L (ref 14–36)
BASOPHILS # BLD AUTO: 0.04 10*3/MM3 (ref 0–0.2)
BASOPHILS NFR BLD AUTO: 0.8 % (ref 0–2)
BILIRUB SERPL-MCNC: 0.6 MG/DL (ref 0.2–1.3)
BUN BLD-MCNC: 12 MG/DL (ref 7–21)
BUN/CREAT SERPL: 17.9 (ref 7–25)
CALCIUM SPEC-SCNC: 9.1 MG/DL (ref 8.4–10.2)
CHLORIDE SERPL-SCNC: 98 MMOL/L (ref 95–110)
CHOLEST SERPL-MCNC: 176 MG/DL (ref 0–199)
CO2 SERPL-SCNC: 29 MMOL/L (ref 22–31)
CREAT BLD-MCNC: 0.67 MG/DL (ref 0.5–1)
DEPRECATED RDW RBC AUTO: 41.2 FL (ref 36.4–46.3)
EOSINOPHIL # BLD AUTO: 0.21 10*3/MM3 (ref 0–0.7)
EOSINOPHIL NFR BLD AUTO: 4.4 % (ref 0–7)
ERYTHROCYTE [DISTWIDTH] IN BLOOD BY AUTOMATED COUNT: 12.2 % (ref 11.5–14.5)
GFR SERPL CREATININE-BSD FRML MDRD: 93 ML/MIN/1.73 (ref 51–120)
GLOBULIN UR ELPH-MCNC: 3.5 GM/DL (ref 2.3–3.5)
GLUCOSE BLD-MCNC: 79 MG/DL (ref 60–100)
HCT VFR BLD AUTO: 39.1 % (ref 35–45)
HDLC SERPL-MCNC: 65 MG/DL (ref 60–200)
HGB BLD-MCNC: 13.2 G/DL (ref 12–15.5)
IMM GRANULOCYTES # BLD: 0 10*3/MM3 (ref 0–0.02)
IMM GRANULOCYTES NFR BLD: 0 % (ref 0–0.5)
LDLC/HDLC SERPL: 1.51 {RATIO} (ref 0–3.22)
LYMPHOCYTES # BLD AUTO: 1.98 10*3/MM3 (ref 0.6–4.2)
LYMPHOCYTES NFR BLD AUTO: 41.4 % (ref 10–50)
MCH RBC QN AUTO: 31.3 PG (ref 26.5–34)
MCHC RBC AUTO-ENTMCNC: 33.8 G/DL (ref 31.4–36)
MCV RBC AUTO: 92.7 FL (ref 80–98)
MONOCYTES # BLD AUTO: 0.34 10*3/MM3 (ref 0–0.9)
MONOCYTES NFR BLD AUTO: 7.1 % (ref 0–12)
NEUTROPHILS # BLD AUTO: 2.21 10*3/MM3 (ref 2–8.6)
NEUTROPHILS NFR BLD AUTO: 46.3 % (ref 37–80)
PLATELET # BLD AUTO: 281 10*3/MM3 (ref 150–450)
PMV BLD AUTO: 10 FL (ref 8–12)
POTASSIUM BLD-SCNC: 4.6 MMOL/L (ref 3.5–5.1)
PROT SERPL-MCNC: 7.7 G/DL (ref 6.3–8.6)
RBC # BLD AUTO: 4.22 10*6/MM3 (ref 3.77–5.16)
SODIUM BLD-SCNC: 137 MMOL/L (ref 137–145)
TRIGL SERPL-MCNC: 64 MG/DL (ref 20–199)
TSH SERPL DL<=0.05 MIU/L-ACNC: 1.21 MIU/ML (ref 0.46–4.68)
WBC NRBC COR # BLD: 4.78 10*3/MM3 (ref 3.2–9.8)

## 2017-11-21 PROCEDURE — 80050 GENERAL HEALTH PANEL: CPT | Performed by: INTERNAL MEDICINE

## 2017-11-21 PROCEDURE — 80061 LIPID PANEL: CPT | Performed by: INTERNAL MEDICINE

## 2017-11-21 PROCEDURE — 82306 VITAMIN D 25 HYDROXY: CPT | Performed by: INTERNAL MEDICINE

## 2017-11-21 PROCEDURE — 36415 COLL VENOUS BLD VENIPUNCTURE: CPT | Performed by: INTERNAL MEDICINE

## 2017-11-21 PROCEDURE — 99213 OFFICE O/P EST LOW 20 MIN: CPT | Performed by: FAMILY MEDICINE

## 2017-11-21 NOTE — PROGRESS NOTES
Elizabeth Lopez is a 52 y.o. female.     Back Pain   This is a chronic problem. The current episode started more than 1 year ago. The problem occurs daily. The problem has been gradually worsening since onset. The pain is present in the lumbar spine. The quality of the pain is described as aching, stabbing and shooting. Radiates to: She has had issues with radiation and sciatica shooting into her legs.   The pain is at a severity of 4/10. The pain is mild. The symptoms are aggravated by bending, sitting and standing. Stiffness is present all day. Associated symptoms include numbness and weakness. Pertinent negatives include no abdominal pain, bladder incontinence, bowel incontinence, chest pain, dysuria, fever, headaches, leg pain, paresis, paresthesias, pelvic pain, perianal numbness, tingling or weight loss. Risk factors include menopause. She has tried analgesics, home exercises and NSAIDs (She has been seeing pain management. She is considering doing injections. ) for the symptoms. The treatment provided mild relief.   Anxiety   Presents for follow-up visit. Symptoms include excessive worry, insomnia and nervous/anxious behavior. Patient reports no chest pain, compulsions, confusion, decreased concentration, depressed mood, dizziness, dry mouth, feeling of choking, hyperventilation, impotence, irritability, malaise, muscle tension, nausea, obsessions, palpitations, panic, restlessness, shortness of breath or suicidal ideas. Symptoms occur occasionally. The severity of symptoms is moderate. The quality of sleep is fair. Nighttime awakenings: occasional.     Compliance with medications is %.        Current Outpatient Prescriptions:   •  aspirin 81 MG chewable tablet, Chew 81 mg daily., Disp: , Rfl:   •  calcium carbonate 1250 MG capsule, Take 600 mg by mouth., Disp: , Rfl:   •  EASY TOUCH PEN NEEDLES 31G X 5 MM misc, USE AS DIRECTED., Disp: 50 each, Rfl: 11  •  escitalopram (LEXAPRO) 10 MG tablet,  TAKE 1 TABLET BY MOUTH DAILY, Disp: 30 tablet, Rfl: 0  •  FORTEO 600 MCG/2.4ML injection, INJECT 2.4ML SUBQ EVERY DAY AS DIRECTED, Disp: 2.4 mL, Rfl: 3  •  HYDROcodone-acetaminophen (NORCO) 5-325 MG per tablet, Take 1 tablet by mouth 2 (Two) Times a Day As Needed for Moderate Pain ., Disp: 30 tablet, Rfl: 0  •  lidocaine (XYLOCAINE) 5 % ointment, Apply  topically Every 2 (Two) Hours As Needed for Mild Pain (1-3)., Disp: 50 g, Rfl: 1  •  LORazepam (ATIVAN) 0.5 MG tablet, Take 1 tablet by mouth 2 (Two) Times a Day As Needed for Anxiety., Disp: 60 tablet, Rfl: 2  •  magnesium oxide (MAGOX) 400 (241.3 Mg) MG tablet tablet, Take 800 mg by mouth Daily., Disp: , Rfl:   •  methIMAzole (TAPAZOLE) 5 MG tablet, One tab po daily, Disp: 30 tablet, Rfl: 11  •  metoprolol succinate XL (TOPROL XL) 25 MG 24 hr tablet, Take 12.5 mg by mouth daily., Disp: , Rfl:   •  Multiple Vitamins-Minerals (MULTIVITAMIN PO), Take 1 tablet by mouth daily., Disp: , Rfl:   •  naproxen (NAPROSYN) 375 MG tablet, Take 1 tablet by mouth 2 (Two) Times a Day As Needed for mild pain (1-3)., Disp: 30 tablet, Rfl: 1  •  Omega-3 Fatty Acids (FISH OIL CONCENTRATE) 1000 MG capsule, Take 1 capsule by mouth 2 (two) times a day., Disp: , Rfl:   •  omeprazole (priLOSEC) 20 MG capsule, Take 1 capsule by mouth Daily., Disp: 90 capsule, Rfl: 1  •  tiZANidine (ZANAFLEX) 4 MG tablet, Take 1 tablet by mouth Every 6 (Six) Hours As Needed for Muscle Spasms., Disp: 120 tablet, Rfl: 2  •  vitamin E 400 UNIT capsule, Take 400 Units by mouth daily., Disp: , Rfl:     The following portions of the patient's history were reviewed and updated as appropriate: allergies, current medications, past family history, past medical history, past social history, past surgical history and problem list.    Review of Systems   Constitutional: Negative for fever, irritability and weight loss.   Respiratory: Negative for shortness of breath.    Cardiovascular: Negative for chest pain and  "palpitations.   Gastrointestinal: Negative for abdominal pain, bowel incontinence and nausea.   Genitourinary: Negative for bladder incontinence, dysuria, impotence and pelvic pain.   Musculoskeletal: Positive for back pain.   Neurological: Positive for weakness and numbness. Negative for dizziness, tingling, headaches and paresthesias.   Psychiatric/Behavioral: Negative for confusion, decreased concentration and suicidal ideas. The patient is nervous/anxious and has insomnia.        Objective    Vitals:    11/21/17 0853   BP: 98/68   Weight: 143 lb 8 oz (65.1 kg)   Height: 65\" (165.1 cm)       Physical Exam   Constitutional: She is oriented to person, place, and time. She appears well-developed and well-nourished. No distress.   Cardiovascular: Normal rate, regular rhythm and normal heart sounds.    No murmur heard.  No LE edema.   Pulmonary/Chest: Effort normal and breath sounds normal. No respiratory distress.   Abdominal: Soft. Bowel sounds are normal. She exhibits no distension. There is no tenderness.   Musculoskeletal:        Cervical back: She exhibits tenderness and spasm. She exhibits normal range of motion, no bony tenderness, no deformity and no laceration.        Lumbar back: She exhibits decreased range of motion, tenderness, pain and spasm. She exhibits no deformity.   Neurological: She is alert and oriented to person, place, and time.   Psychiatric: She has a normal mood and affect. Her behavior is normal. Judgment and thought content normal.   Nursing note and vitals reviewed.      Assessment/Plan   Problems Addressed this Visit        Nervous and Auditory    Lumbar back pain       Other    Generalized anxiety disorder - Primary        1.) YOANA-  Seems to be doing better with counseling. Will continue to wean off the Ativan. She hasn't been taking it daily. She has been only taking in emergency.  2.) Lumbar back pain-  Seems to be a little worse. Follow-up with pain management. Recommended that she " try bengay mixed with her lidocaine.  Also wrote for a TENS unit. That did help her in PT.  RTC in 2-3 months or sooner PRN

## 2017-11-27 RX ORDER — TIZANIDINE 4 MG/1
TABLET ORAL
Qty: 120 TABLET | Refills: 0 | Status: SHIPPED | OUTPATIENT
Start: 2017-11-27 | End: 2018-01-23 | Stop reason: SDUPTHER

## 2017-11-28 ENCOUNTER — OFFICE VISIT (OUTPATIENT)
Dept: ENDOCRINOLOGY | Facility: CLINIC | Age: 52
End: 2017-11-28

## 2017-11-28 VITALS
DIASTOLIC BLOOD PRESSURE: 70 MMHG | WEIGHT: 143 LBS | BODY MASS INDEX: 23.82 KG/M2 | HEIGHT: 65 IN | SYSTOLIC BLOOD PRESSURE: 116 MMHG | HEART RATE: 67 BPM

## 2017-11-28 DIAGNOSIS — N95.1 MENOPAUSAL SYNDROME (HOT FLASHES): ICD-10-CM

## 2017-11-28 DIAGNOSIS — E55.9 VITAMIN D DEFICIENCY: ICD-10-CM

## 2017-11-28 DIAGNOSIS — F41.1 GENERALIZED ANXIETY DISORDER: ICD-10-CM

## 2017-11-28 DIAGNOSIS — E05.90 HYPERTHYROIDISM: Primary | ICD-10-CM

## 2017-11-28 DIAGNOSIS — M81.0 AGE-RELATED OSTEOPOROSIS WITHOUT CURRENT PATHOLOGICAL FRACTURE: ICD-10-CM

## 2017-11-28 PROCEDURE — 99214 OFFICE O/P EST MOD 30 MIN: CPT | Performed by: INTERNAL MEDICINE

## 2017-11-28 NOTE — PROGRESS NOTES
Nerissa Lopez is a 52 y.o. female who presents for  evaluation of   Patient Active Problem List   Diagnosis   • Hyperthyroidism   • Osteoporosis   • Vitamin D deficiency   • Menopausal syndrome (hot flashes)   • Anal fistula   • Anemia   • Astigmatism   • Death of family member   • Gastroesophageal reflux disease   • Generalized anxiety disorder   • Goiter   • Graves disease   • Hematuria   • Lesion of eyelid   • Lumbosacral spondylolysis   • Menopausal syndrome   • Myopia   • Lumbar back pain   • Panic attack   • Presbyopia   • Takotsubo cardiomyopathy   • Thoracic spondylosis       Primary Care/Referring Provider  Raiza Saini MD   follow up     reason - subclinical hyperthyroidism and osteoporosis    from 2-15 neg tsi and tpo ab and toxic subcm MNG , most likely still Graves'   ==============    duration - Jan 2014    ==============    quantity    TSH mild suppression   between 0.1 to 0.4     Now nl     ==============    timing     constant      ==============    location -   Thyroid US was done     bilateral sucm complex thyroid cysts  maximal dimension 5 mm     US dimensions - within normal limtis    personally reviewed from July 201 4 and stable when compared to July 2016    ==============    symptoms - wake up tired , hot flashes, weight loss improved     ======================    DXA from March 2016    total lumbar spine t score is negative 3.2    femoral neck right hip t score is negative 2.2    patient has severe osteoporosis    Repeat 4-17 shows 7% improvement at spine     DATE OF PROCEDURE:  4/13/2017 10:17 AM CDT     BONE MINERAL DENSITOMETRY (DXA)     INDICATION FOR PROCEDURE:  Bone density screening     COMPARISON: March 16, 2016.     FINDINGS:     L1-L4 bone mineral density (BMD):      0.743 grams per square centimeter     2.8 standard deviations (T score) below the mean compared to a  young normal.      T score is -2.8. This represents a 7.4% increase in bone  density since the previous  study when bone density is 0.692 g/sq  cm.     Mean of bilateral femoral necks, bone mineral density:              0.609 grams per square centimeter    2.2 standard deviations (T score) below the mean compared to a  young normal.      T score is -2.2.     IMPRESSION:  CONCLUSION:     1.  Bone mineral density measurements as above.  2.  Lumbar spine:  Osteoporosis.   3.  Femoral necks:  Osteopenia.          -------------------    methimazole was started now at 5 mg     Past Medical History:   Diagnosis Date   • Anal fistula    • Anemia    • Astigmatism    • Broken heart syndrome 03/02/2016    when mother passed away   • Chronic pain disorder    • Death of family member     Mother passes away.   • Generalized anxiety disorder    • GERD (gastroesophageal reflux disease)    • Goiter    • Graves disease    • Heartburn    • Hematuria    • Hyperthyroidism    • Lesion of eyelid     LLL   • Low back pain    • Lumbosacral spondylosis    • Menopausal syndrome    • Myopia    • Osteoporosis    • Pain in back     Lumbar region   • Pain in joint involving left lower leg    • Pain in wrist    • Panic attack    • Presbyopia    • Right upper quadrant pain    • Takotsubo cardiomyopathy    • Thoracic spondylosis      Family History   Problem Relation Age of Onset   • Heart disease Mother    • Hyperlipidemia Mother    • Hypertension Mother    • Osteoporosis Mother    • Cancer Father    • Diabetes Sister    • Thyroid disease Sister    • COPD Sister    • Hypertension Sister    • Migraines Sister    • Diabetes Brother    • COPD Brother    • Cancer Maternal Grandmother      Ovarian Cancer   • Breast cancer Other    • Cancer Other    • Other Other      Gall Bladder disease   • Breast cancer Other    • Breast cancer Maternal Aunt      Social History   Substance Use Topics   • Smoking status: Never Smoker   • Smokeless tobacco: Never Used   • Alcohol use No         Current Outpatient Prescriptions:   •  aspirin 81 MG chewable tablet, Chew 81  mg daily., Disp: , Rfl:   •  calcium carbonate 1250 MG capsule, Take 600 mg by mouth., Disp: , Rfl:   •  EASY TOUCH PEN NEEDLES 31G X 5 MM misc, USE AS DIRECTED., Disp: 50 each, Rfl: 11  •  escitalopram (LEXAPRO) 10 MG tablet, TAKE 1 TABLET BY MOUTH DAILY, Disp: 30 tablet, Rfl: 0  •  FORTEO 600 MCG/2.4ML injection, INJECT 2.4ML SUBQ EVERY DAY AS DIRECTED, Disp: 2.4 mL, Rfl: 3  •  HYDROcodone-acetaminophen (NORCO) 5-325 MG per tablet, Take 1 tablet by mouth 2 (Two) Times a Day As Needed for Moderate Pain ., Disp: 30 tablet, Rfl: 0  •  lidocaine (XYLOCAINE) 5 % ointment, Apply  topically Every 2 (Two) Hours As Needed for Mild Pain (1-3)., Disp: 50 g, Rfl: 1  •  LORazepam (ATIVAN) 0.5 MG tablet, Take 1 tablet by mouth 2 (Two) Times a Day As Needed for Anxiety., Disp: 60 tablet, Rfl: 2  •  magnesium oxide (MAGOX) 400 (241.3 Mg) MG tablet tablet, Take 800 mg by mouth Daily., Disp: , Rfl:   •  methIMAzole (TAPAZOLE) 5 MG tablet, One tab po daily, Disp: 30 tablet, Rfl: 11  •  metoprolol succinate XL (TOPROL XL) 25 MG 24 hr tablet, Take 12.5 mg by mouth daily., Disp: , Rfl:   •  Multiple Vitamins-Minerals (MULTIVITAMIN PO), Take 1 tablet by mouth daily., Disp: , Rfl:   •  naproxen (NAPROSYN) 375 MG tablet, Take 1 tablet by mouth 2 (Two) Times a Day As Needed for mild pain (1-3)., Disp: 30 tablet, Rfl: 1  •  Omega-3 Fatty Acids (FISH OIL CONCENTRATE) 1000 MG capsule, Take 1 capsule by mouth 2 (two) times a day., Disp: , Rfl:   •  omeprazole (priLOSEC) 20 MG capsule, Take 1 capsule by mouth Daily., Disp: 90 capsule, Rfl: 1  •  tiZANidine (ZANAFLEX) 4 MG tablet, TAKE 1 TABLET BY MOUTH EVERY 6 HOURS AS NEEDED FOR MUSCLE SPASMS, Disp: 120 tablet, Rfl: 0  •  vitamin E 400 UNIT capsule, Take 400 Units by mouth daily., Disp: , Rfl:     Review of Systems    Review of Systems   Constitutional: Negative for activity change, appetite change, chills, diaphoresis, fatigue, fever and unexpected weight change.   HENT: Negative for  congestion, drooling, ear discharge, ear pain, facial swelling, mouth sores, nosebleeds, postnasal drip, sinus pressure, sneezing, sore throat, tinnitus, trouble swallowing and voice change.    Eyes: Negative.  Negative for photophobia, pain, discharge, redness and itching.   Respiratory: Negative.  Negative for apnea, cough, choking, chest tightness, shortness of breath, wheezing and stridor.    Cardiovascular: Negative.  Negative for chest pain, palpitations and leg swelling.   Gastrointestinal: Negative.  Negative for abdominal distention, abdominal pain, constipation, diarrhea, nausea and vomiting.   Endocrine: Negative.  Negative for cold intolerance, heat intolerance, polydipsia, polyphagia and polyuria.   Genitourinary: Negative for difficulty urinating, dysuria, flank pain and frequency.   Musculoskeletal: Negative for arthralgias, back pain, gait problem, joint swelling, myalgias, neck pain and neck stiffness.   Skin: Negative for color change, pallor, rash and wound.   Allergic/Immunologic: Negative for environmental allergies, food allergies and immunocompromised state.   Neurological: Negative for dizziness, tremors, seizures, syncope, facial asymmetry, speech difficulty, weakness, light-headedness, numbness and headaches.   Hematological: Negative for adenopathy. Does not bruise/bleed easily.   Psychiatric/Behavioral: Negative for agitation, behavioral problems, confusion, decreased concentration, dysphoric mood, hallucinations, self-injury, sleep disturbance and suicidal ideas. The patient is nervous/anxious. The patient is not hyperactive.         Objective:     LMP  (LMP Unknown)    Physical Exam   Constitutional: She is oriented to person, place, and time. She appears well-developed.   HENT:   Head: Normocephalic.   Right Ear: External ear normal.   Left Ear: External ear normal.   Nose: Nose normal.   Eyes: Conjunctivae and EOM are normal. No scleral icterus.   Neck: Normal range of motion. Neck  supple. No tracheal deviation present. No thyromegaly present.   Cardiovascular: Normal rate, regular rhythm, normal heart sounds and intact distal pulses.  Exam reveals no gallop and no friction rub.    No murmur heard.  Pulmonary/Chest: Effort normal and breath sounds normal. No stridor. No respiratory distress. She has no wheezes. She has no rales. She exhibits no tenderness.   Abdominal: Soft. Bowel sounds are normal. She exhibits no distension and no mass. There is no tenderness. There is no rebound and no guarding.   Musculoskeletal: Normal range of motion. She exhibits no tenderness or deformity.   Lymphadenopathy:     She has no cervical adenopathy.   Neurological: She is alert and oriented to person, place, and time. She displays normal reflexes. She exhibits normal muscle tone. Coordination normal.   Skin: No rash noted. No erythema. No pallor.   Psychiatric: She has a normal mood and affect. Her behavior is normal. Judgment and thought content normal.       Lab Review    Lab Results   Component Value Date    HGBA1C 5.6 01/09/2014       Results for orders placed or performed in visit on 09/21/17   POCT urinalysis dipstick, manual   Result Value Ref Range    Color Yellow Yellow, Straw, Dark Yellow, Andree    Clarity, UA Clear Clear    Glucose, UA Negative Negative, 1000 mg/dL (3+) mg/dL    Bilirubin Negative Negative    Ketones, UA Negative Negative    Specific Gravity  1.005 1.005 - 1.030    Blood, UA Trace (A) Negative    pH, Urine 8.0 5.0 - 8.0    Protein, POC Negative Negative mg/dL    Urobilinogen, UA Normal Normal    Leukocytes Trace (A) Negative    Nitrite, UA Negative Negative       Lab Results   Component Value Date    TSH 1.210 11/21/2017       Assessment           ICD-10-CM ICD-9-CM   1. Hyperthyroidism E05.90 242.90   2. Age-related osteoporosis without current pathological fracture M81.0 733.01   3. Vitamin D deficiency E55.9 268.9   4. Menopausal syndrome (hot flashes) N95.1 627.2   5.  Generalized anxiety disorder F41.1 300.02         Hyperthyroidism  Component  Latest Ref Rng 5/23/2016 8/15/2016   TSH Baseline  0.46 - 4.68 uIU/ml 0.35 (L) 1.20   Free T4  0.78 - 2.19 ng/dl 0.95 0.86   Thyroid Stimulating Immunoglobulin  0 - 139 %       Thyroid Peroxidase Antibody  0 - 34 IU/mL       T3, Total  97 - 169 ng/dl   136        Lab Results   Component Value Date    TSH 1.210 11/21/2017     Lab Results   Component Value Date    GLUCOSE 79 11/21/2017    BUN 12 11/21/2017    CREATININE 0.67 11/21/2017    EGFRIFNONA 93 11/21/2017    BCR 17.9 11/21/2017    CO2 29.0 11/21/2017    CALCIUM 9.1 11/21/2017    ALBUMIN 4.20 11/21/2017    LABIL2 1.2 11/21/2017    AST 49 (H) 11/21/2017    ALT 34 11/21/2017     Lab Results   Component Value Date    WBC 4.78 11/21/2017    HGB 13.2 11/21/2017    HCT 39.1 11/21/2017    MCV 92.7 11/21/2017     11/21/2017            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 documented as back as 2014 and personally reviewed   Report from 2016 , compared to 2014 no change      Neg Thyroid Antibodies         On methimazole 5 mg daily and effective     Rationale for tx - severe osteoporosis and Takotsubo      Osteoporosis     March 2016     Total Lumbar spine- 0.692 gm/cm2 T-Score -3.2     4 2017, repeat shows improvement on forteo         While on 50 th weekly , Vit D 83    For that reason I suggested to stop and start    calcium + D once daily  And vit D\      Lab Results   Component Value Date    EBHM61KW 47.6 11/21/2017    IWMY71OL 44.7 05/18/2017    BYTR36VS 53.2 11/22/2016            approve for forteo , started 27 of June 2016      PTH and calcium- nl     n-telopeptide - nl     urine calcium - nl     Try to repeat dxa in March 2017         Menopausal syndrome (hot flashes)  I started estrogen patches but allergy     Now Taktsubo, I prefer no estrogen     Doug stared metoprolol          Lab Results   Component Value Date    CHOL 176 11/21/2017    CHOL 178 05/18/2017     Lab Results    Component Value Date    TRIG 64 11/21/2017    TRIG 42 05/18/2017    TRIG 100 03/31/2016     Lab Results   Component Value Date    HDL 65 11/21/2017    HDL 68 05/18/2017    HDL 75 03/31/2016     Lab Results   Component Value Date    LDLCALC 76 03/31/2016    LDLCALC 97 01/09/2014     Excellent without treatment

## 2017-12-08 PROCEDURE — 87086 URINE CULTURE/COLONY COUNT: CPT | Performed by: NURSE PRACTITIONER

## 2017-12-18 ENCOUNTER — OFFICE VISIT (OUTPATIENT)
Dept: PAIN MEDICINE | Facility: CLINIC | Age: 52
End: 2017-12-18

## 2017-12-18 VITALS
BODY MASS INDEX: 24.01 KG/M2 | HEIGHT: 65 IN | WEIGHT: 144.1 LBS | DIASTOLIC BLOOD PRESSURE: 60 MMHG | SYSTOLIC BLOOD PRESSURE: 106 MMHG

## 2017-12-18 DIAGNOSIS — M51.36 DDD (DEGENERATIVE DISC DISEASE), LUMBAR: Primary | ICD-10-CM

## 2017-12-18 DIAGNOSIS — M47.816 FACET ARTHROPATHY, LUMBAR: ICD-10-CM

## 2017-12-18 DIAGNOSIS — Z79.899 HIGH RISK MEDICATIONS (NOT ANTICOAGULANTS) LONG-TERM USE: ICD-10-CM

## 2017-12-18 DIAGNOSIS — M43.07 LUMBOSACRAL SPONDYLOLYSIS: ICD-10-CM

## 2017-12-18 DIAGNOSIS — M79.18 MYOFACIAL MUSCLE PAIN: ICD-10-CM

## 2017-12-18 PROCEDURE — 99213 OFFICE O/P EST LOW 20 MIN: CPT | Performed by: PAIN MEDICINE

## 2017-12-18 NOTE — PROGRESS NOTES
"Nerissa Lopez is a 52 y.o. female.   1965    HPI:   Location: lower back  Quality: shooting, aching, sharp and dull  Severity: 5/10  Timing: constant  Alleviating: pain medication  Aggravating: increased activity    Pt with chronic lower back pain with occ minerva leg pain. No loss of bowel or bladder. Pt has seen Dr. Marroquin Neuro Surgeon- [facility], no surgery at this time related to \"bone health\". Pt would like to discuss increase to Norco 5mg TID. Opiate medications provides enough relief for daily activity and ambulation. NO SE. Pt happy with pain management visit and regimen.         The following portions of the patient's history were reviewed by me and updated as appropriate: allergies, current medications, past family history, past medical history, past social history, past surgical history and problem list.    Past Medical History:   Diagnosis Date   • Anal fistula    • Anemia    • Astigmatism    • Broken heart syndrome 03/02/2016    when mother passed away   • Chronic pain disorder    • Death of family member     Mother passes away.   • Generalized anxiety disorder    • GERD (gastroesophageal reflux disease)    • Goiter    • Graves disease    • Heartburn    • Hematuria    • Hyperthyroidism    • Lesion of eyelid     LLL   • Low back pain    • Lumbosacral spondylosis    • Menopausal syndrome    • Myopia    • Osteoporosis    • Pain in back     Lumbar region   • Pain in joint involving left lower leg    • Pain in wrist    • Panic attack    • Presbyopia    • Right upper quadrant pain    • Takotsubo cardiomyopathy    • Thoracic spondylosis        Social History     Social History   • Marital status:      Spouse name: N/A   • Number of children: N/A   • Years of education: N/A     Occupational History   • Not on file.     Social History Main Topics   • Smoking status: Never Smoker   • Smokeless tobacco: Never Used   • Alcohol use No   • Drug use: No   • Sexual activity: Defer     Other Topics Concern   • " Not on file     Social History Narrative       Family History   Problem Relation Age of Onset   • Heart disease Mother    • Hyperlipidemia Mother    • Hypertension Mother    • Osteoporosis Mother    • Cancer Father    • Diabetes Sister    • Thyroid disease Sister    • COPD Sister    • Hypertension Sister    • Migraines Sister    • Diabetes Brother    • COPD Brother    • Cancer Maternal Grandmother      Ovarian Cancer   • Breast cancer Other    • Cancer Other    • Other Other      Gall Bladder disease   • Breast cancer Other    • Breast cancer Maternal Aunt          Current Outpatient Prescriptions:   •  aspirin 81 MG chewable tablet, Chew 81 mg daily., Disp: , Rfl:   •  calcium carbonate 1250 MG capsule, Take 600 mg by mouth., Disp: , Rfl:   •  EASY TOUCH PEN NEEDLES 31G X 5 MM misc, USE AS DIRECTED., Disp: 50 each, Rfl: 11  •  escitalopram (LEXAPRO) 10 MG tablet, TAKE 1 TABLET BY MOUTH DAILY, Disp: 30 tablet, Rfl: 0  •  FORTEO 600 MCG/2.4ML injection, INJECT 2.4ML SUBQ EVERY DAY AS DIRECTED, Disp: 2.4 mL, Rfl: 3  •  HYDROcodone-acetaminophen (NORCO) 5-325 MG per tablet, Take 1 tablet by mouth 2 (Two) Times a Day As Needed for Moderate Pain ., Disp: 30 tablet, Rfl: 0  •  lidocaine (XYLOCAINE) 5 % ointment, Apply  topically Every 2 (Two) Hours As Needed for Mild Pain (1-3)., Disp: 50 g, Rfl: 1  •  LORazepam (ATIVAN) 0.5 MG tablet, Take 1 tablet by mouth 2 (Two) Times a Day As Needed for Anxiety., Disp: 60 tablet, Rfl: 2  •  magnesium oxide (MAGOX) 400 (241.3 Mg) MG tablet tablet, Take 800 mg by mouth Daily., Disp: , Rfl:   •  methIMAzole (TAPAZOLE) 5 MG tablet, One tab po daily, Disp: 30 tablet, Rfl: 11  •  metoprolol succinate XL (TOPROL XL) 25 MG 24 hr tablet, Take 12.5 mg by mouth daily., Disp: , Rfl:   •  Multiple Vitamins-Minerals (MULTIVITAMIN PO), Take 1 tablet by mouth daily., Disp: , Rfl:   •  naproxen (NAPROSYN) 375 MG tablet, Take 1 tablet by mouth 2 (Two) Times a Day As Needed for mild pain (1-3)., Disp:  30 tablet, Rfl: 1  •  Omega-3 Fatty Acids (FISH OIL CONCENTRATE) 1000 MG capsule, Take 1 capsule by mouth 2 (two) times a day., Disp: , Rfl:   •  omeprazole (priLOSEC) 20 MG capsule, Take 1 capsule by mouth Daily., Disp: 90 capsule, Rfl: 1  •  tiZANidine (ZANAFLEX) 4 MG tablet, TAKE 1 TABLET BY MOUTH EVERY 6 HOURS AS NEEDED FOR MUSCLE SPASMS, Disp: 120 tablet, Rfl: 0  •  vitamin E 400 UNIT capsule, Take 400 Units by mouth daily., Disp: , Rfl:     No Known Allergies    Review of Systems   Musculoskeletal: Positive for back pain (lower).   All other systems reviewed and are negative.    All systems reviewed and negative except for above.    Physical Exam   Constitutional: She is oriented to person, place, and time. She appears well-developed and well-nourished. She does not appear ill. No distress.   HENT:   Right Ear: Hearing normal.   Left Ear: Hearing normal.   Neck: Full passive range of motion without pain. No muscular tenderness present. Normal range of motion present.   Cardiovascular: Normal rate.    Pulmonary/Chest: Effort normal. No respiratory distress.   Musculoskeletal:        Lumbar back: She exhibits decreased range of motion (Flexion < 70 deg and 10 deg ext with facet loading bilateral ) and tenderness ( midline ttp ).   Neurological: She is alert and oriented to person, place, and time. She has normal strength and normal reflexes. She displays no tremor and normal reflexes. No sensory deficit. She displays no seizure activity. Coordination and gait normal.   Reflex Scores:       Patellar reflexes are 2+ on the right side and 2+ on the left side.       Achilles reflexes are 2+ on the right side and 2+ on the left side.  Skin: Skin is warm and dry. No rash noted. No erythema.   Psychiatric: She has a normal mood and affect. Her behavior is normal. Judgment normal. She expresses no homicidal and no suicidal ideation.   Nursing note and vitals reviewed.      Nerissa was seen today for back  pain.    Diagnoses and all orders for this visit:    DDD (degenerative disc disease), lumbar    Facet arthropathy, lumbar    Lumbosacral spondylolysis    Myofacial muscle pain    High risk medications (not anticoagulants) long-term use        Medication: Patient reports no negative side effects, Patient reports appropriate usage and storage habits and Patient's opioid provides enough relief to be more active and perform activities of daily living with less discomfort. Norco 5mg tid. Discussed case with Bimal Stroud, opiate medication refilled ×2 hand written scripts.      Interventional: Will increase norco 5mg to tid.  GUEVARA and any neurological impairments discussed with patient that may need of emergency evaluation.    Rehab: Pt with home arom for lower back.     Behavioral: No aberrant behavior noted. BRENDAN Report # [id number] was reviewed and is consistent with stated history    Urine drug screen None at this time          This document has been electronically signed by YOANA Caballero on December 18, 2017 10:04 AM          This document has been electronically signed by YOANA Caballero on December 18, 2017 10:04 AM

## 2018-01-18 RX ORDER — FLURBIPROFEN SODIUM 0.3 MG/ML
SOLUTION/ DROPS OPHTHALMIC
Qty: 50 EACH | Refills: 2 | Status: SHIPPED | OUTPATIENT
Start: 2018-01-18 | End: 2018-11-20

## 2018-01-23 ENCOUNTER — OFFICE VISIT (OUTPATIENT)
Dept: FAMILY MEDICINE CLINIC | Facility: CLINIC | Age: 53
End: 2018-01-23

## 2018-01-23 VITALS
WEIGHT: 145.8 LBS | SYSTOLIC BLOOD PRESSURE: 110 MMHG | DIASTOLIC BLOOD PRESSURE: 82 MMHG | BODY MASS INDEX: 24.29 KG/M2 | HEIGHT: 65 IN

## 2018-01-23 DIAGNOSIS — F41.1 GENERALIZED ANXIETY DISORDER: Primary | ICD-10-CM

## 2018-01-23 DIAGNOSIS — G89.29 CHRONIC PAIN OF LEFT KNEE: ICD-10-CM

## 2018-01-23 DIAGNOSIS — M54.6 LEFT-SIDED THORACIC BACK PAIN, UNSPECIFIED CHRONICITY: ICD-10-CM

## 2018-01-23 DIAGNOSIS — F41.1 GENERALIZED ANXIETY DISORDER: ICD-10-CM

## 2018-01-23 DIAGNOSIS — M25.562 CHRONIC PAIN OF LEFT KNEE: ICD-10-CM

## 2018-01-23 PROCEDURE — 99213 OFFICE O/P EST LOW 20 MIN: CPT | Performed by: FAMILY MEDICINE

## 2018-01-23 RX ORDER — LORAZEPAM 0.5 MG/1
0.5 TABLET ORAL DAILY PRN
Qty: 30 TABLET | Refills: 0 | Status: SHIPPED | OUTPATIENT
Start: 2018-01-23 | End: 2018-11-20 | Stop reason: SDUPTHER

## 2018-01-23 RX ORDER — TIZANIDINE 4 MG/1
4 TABLET ORAL EVERY 6 HOURS PRN
Qty: 120 TABLET | Refills: 2 | Status: SHIPPED | OUTPATIENT
Start: 2018-01-23 | End: 2018-04-22 | Stop reason: SDUPTHER

## 2018-01-23 RX ORDER — NAPROXEN 375 MG/1
375 TABLET ORAL 2 TIMES DAILY PRN
Qty: 30 TABLET | Refills: 1 | Status: SHIPPED | OUTPATIENT
Start: 2018-01-23 | End: 2018-06-25 | Stop reason: SDUPTHER

## 2018-01-23 RX ORDER — LORAZEPAM 0.5 MG/1
TABLET ORAL
Qty: 60 TABLET | Refills: 0 | OUTPATIENT
Start: 2018-01-23

## 2018-01-23 RX ORDER — PEN NEEDLE, DIABETIC 31 GX5/16"
NEEDLE, DISPOSABLE MISCELLANEOUS
Refills: 2 | COMMUNITY
Start: 2018-01-18 | End: 2018-01-23

## 2018-01-23 NOTE — PROGRESS NOTES
Elizabeth Lopez is a 52 y.o. female.     Anxiety   Presents for follow-up visit. Symptoms include nervous/anxious behavior and panic. Patient reports no chest pain, compulsions, confusion, decreased concentration, depressed mood, dizziness, dry mouth, excessive worry, feeling of choking, hyperventilation, impotence, insomnia, irritability, malaise, muscle tension, nausea, obsessions, palpitations, restlessness, shortness of breath or suicidal ideas. Symptoms occur occasionally. The severity of symptoms is moderate. The quality of sleep is fair. Nighttime awakenings: occasional.     Compliance with medications is % (She has been weaning off Ativan. She has only taken a couple times a month.  She quit therapy because she doesn't like to get out of the house.).   Knee Pain    The incident occurred more than 1 week ago. There was no injury mechanism. The pain is present in the left knee. The quality of the pain is described as aching. The pain is at a severity of 4/10. The pain is moderate. The pain has been intermittent since onset. Pertinent negatives include no inability to bear weight, loss of motion, loss of sensation, muscle weakness, numbness or tingling. She reports no foreign bodies present. The symptoms are aggravated by weight bearing. She has tried NSAIDs for the symptoms. The treatment provided mild relief.          Current Outpatient Prescriptions:   •  aspirin 81 MG chewable tablet, Chew 81 mg daily., Disp: , Rfl:   •  B-D UF III MINI PEN NEEDLES 31G X 5 MM misc, USE AS DIRECTED, Disp: 50 each, Rfl: 2  •  calcium carbonate 1250 MG capsule, Take 600 mg by mouth., Disp: , Rfl:   •  escitalopram (LEXAPRO) 10 MG tablet, TAKE 1 TABLET BY MOUTH DAILY, Disp: 30 tablet, Rfl: 0  •  FORTEO 600 MCG/2.4ML injection, INJECT 2.4ML SUBQ EVERY DAY AS DIRECTED, Disp: 2.4 mL, Rfl: 3  •  HYDROcodone-acetaminophen (NORCO) 5-325 MG per tablet, Take 1 tablet by mouth 2 (Two) Times a Day As Needed for Moderate  Pain ., Disp: 30 tablet, Rfl: 0  •  lidocaine (XYLOCAINE) 5 % ointment, Apply  topically Every 2 (Two) Hours As Needed for Mild Pain (1-3)., Disp: 50 g, Rfl: 1  •  LORazepam (ATIVAN) 0.5 MG tablet, Take 1 tablet by mouth 2 (Two) Times a Day As Needed for Anxiety., Disp: 60 tablet, Rfl: 2  •  magnesium oxide (MAGOX) 400 (241.3 Mg) MG tablet tablet, Take 800 mg by mouth Daily., Disp: , Rfl:   •  methIMAzole (TAPAZOLE) 5 MG tablet, One tab po daily, Disp: 30 tablet, Rfl: 11  •  metoprolol succinate XL (TOPROL XL) 25 MG 24 hr tablet, Take 12.5 mg by mouth daily., Disp: , Rfl:   •  Multiple Vitamins-Minerals (MULTIVITAMIN PO), Take 1 tablet by mouth daily., Disp: , Rfl:   •  naproxen (NAPROSYN) 375 MG tablet, Take 1 tablet by mouth 2 (Two) Times a Day As Needed for mild pain (1-3)., Disp: 30 tablet, Rfl: 1  •  Omega-3 Fatty Acids (FISH OIL CONCENTRATE) 1000 MG capsule, Take 1 capsule by mouth 2 (two) times a day., Disp: , Rfl:   •  omeprazole (priLOSEC) 20 MG capsule, Take 1 capsule by mouth Daily., Disp: 90 capsule, Rfl: 1  •  tiZANidine (ZANAFLEX) 4 MG tablet, TAKE 1 TABLET BY MOUTH EVERY 6 HOURS AS NEEDED FOR MUSCLE SPASMS, Disp: 120 tablet, Rfl: 0  •  vitamin E 400 UNIT capsule, Take 400 Units by mouth daily., Disp: , Rfl:     The following portions of the patient's history were reviewed and updated as appropriate: allergies, current medications, past family history, past medical history, past social history, past surgical history and problem list.    Review of Systems   Constitutional: Positive for fatigue. Negative for activity change, appetite change, chills, fever, irritability and unexpected weight change.   Eyes: Negative for visual disturbance.   Respiratory: Negative for cough, chest tightness, shortness of breath and wheezing.    Cardiovascular: Negative for chest pain, palpitations and leg swelling.   Gastrointestinal: Negative for abdominal distention, abdominal pain, constipation, diarrhea, nausea and  "vomiting.   Genitourinary: Negative for impotence.   Musculoskeletal: Positive for arthralgias, back pain and joint swelling. Negative for gait problem, myalgias, neck pain and neck stiffness.   Neurological: Negative for dizziness, tingling, numbness and headaches.   Psychiatric/Behavioral: Positive for dysphoric mood. Negative for confusion, decreased concentration, sleep disturbance and suicidal ideas. The patient is nervous/anxious. The patient does not have insomnia.        Objective    Vitals:    01/23/18 0937   BP: 110/82   Weight: 66.1 kg (145 lb 12.8 oz)   Height: 165.1 cm (65\")       Physical Exam   Constitutional: She is oriented to person, place, and time. She appears well-developed and well-nourished. No distress.   Cardiovascular: Normal rate, regular rhythm and normal heart sounds.    No murmur heard.  No LE edema.   Pulmonary/Chest: Effort normal and breath sounds normal. No respiratory distress.   Abdominal: Soft. Bowel sounds are normal. She exhibits no distension. There is no tenderness.   Musculoskeletal:        Left knee: She exhibits normal range of motion, no swelling, no effusion, no deformity and no erythema. Tenderness found. Medial joint line and lateral joint line tenderness noted.   Neurological: She is alert and oriented to person, place, and time.   Psychiatric: She has a normal mood and affect. Her behavior is normal. Judgment and thought content normal.   Nursing note and vitals reviewed.      Assessment/Plan   Problems Addressed this Visit        Other    Generalized anxiety disorder - Primary      Other Visit Diagnoses     Chronic pain of left knee        Relevant Orders    XR Knee 4+ View Left (Completed)    Left-sided thoracic back pain, unspecified chronicity        Relevant Medications    naproxen (NAPROSYN) 375 MG tablet        1.) YOANA-  She has overall been doing well. She has been only taking the Ativan a couple times a week at the most. Hasn't had to refill in months after " we discussed her not taking it with her opiates.  Will continue the lexapro. That has really been helping her a lot.  2.) Chronic knee pain-  Will get x-ray. Encouraged her to take NSAIDS and ICE when it gets swollen.  Printed some exercises and discussed quad strengthening to help take pressure off her knee.  RTC in 3-4 months or sooner PRN

## 2018-01-23 NOTE — PATIENT INSTRUCTIONS
Knee Exercises  Ask your health care provider which exercises are safe for you. Do exercises exactly as told by your health care provider and adjust them as directed. It is normal to feel mild stretching, pulling, tightness, or discomfort as you do these exercises, but you should stop right away if you feel sudden pain or your pain gets worse. Do not begin these exercises until told by your health care provider.  STRETCHING AND RANGE OF MOTION EXERCISES   These exercises warm up your muscles and joints and improve the movement and flexibility of your knee. These exercises also help to relieve pain, numbness, and tingling.  Exercise A: Knee Extension, Prone  1. Lie on your abdomen on a bed.  2. Place your left / right knee just beyond the edge of the surface so your knee is not on the bed. You can put a towel under your left / right thigh just above your knee for comfort.  3. Relax your leg muscles and allow gravity to straighten your knee. You should feel a stretch behind your left / right knee.  4. Hold this position for __________ seconds.  5. Scoot up so your knee is supported between repetitions.  Repeat __________ times. Complete this stretch __________ times a day.  Exercise B: Knee Flexion, Active   1. Lie on your back with both knees straight. If this causes back discomfort, bend your left / right knee so your foot is flat on the floor.  2. Slowly slide your left / right heel back toward your buttocks until you feel a gentle stretch in the front of your knee or thigh.  3. Hold this position for __________ seconds.  4. Slowly slide your left / right heel back to the starting position.  Repeat __________ times. Complete this exercise __________ times a day.  Exercise C: Quadriceps, Prone   1. Lie on your abdomen on a firm surface, such as a bed or padded floor.  2. Bend your left / right knee and hold your ankle. If you cannot reach your ankle or pant leg, loop a belt around your foot and grab the belt  instead.  3. Gently pull your heel toward your buttocks. Your knee should not slide out to the side. You should feel a stretch in the front of your thigh and knee.  4. Hold this position for __________ seconds.  Repeat __________ times. Complete this stretch __________ times a day.  Exercise D: Hamstring, Supine  1. Lie on your back.  2. Loop a belt or towel over the ball of your left / right foot. The ball of your foot is on the walking surface, right under your toes.  3. Straighten your left / right knee and slowly pull on the belt to raise your leg until you feel a gentle stretch behind your knee.  ¨ Do not let your left / right knee bend while you do this.  ¨ Keep your other leg flat on the floor.  4. Hold this position for __________ seconds.  Repeat __________ times. Complete this stretch __________ times a day.  STRENGTHENING EXERCISES   These exercises build strength and endurance in your knee. Endurance is the ability to use your muscles for a long time, even after they get tired.  Exercise E: Quadriceps, Isometric   1. Lie on your back with your left / right leg extended and your other knee bent. Put a rolled towel or small pillow under your knee if told by your health care provider.  2. Slowly tense the muscles in the front of your left / right thigh. You should see your kneecap slide up toward your hip or see increased dimpling just above the knee. This motion will push the back of the knee toward the floor.  3. For __________ seconds, keep the muscle as tight as you can without increasing your pain.  4. Relax the muscles slowly and completely.  Repeat __________ times. Complete this exercise __________ times a day.  Exercise F: Straight Leg Raises - Quadriceps  1. Lie on your back with your left / right leg extended and your other knee bent.  2. Tense the muscles in the front of your left / right thigh. You should see your kneecap slide up or see increased dimpling just above the knee. Your thigh may  even shake a bit.  3. Keep these muscles tight as you raise your leg 4-6 inches (10-15 cm) off the floor. Do not let your knee bend.  4. Hold this position for __________ seconds.  5. Keep these muscles tense as you lower your leg.  6. Relax your muscles slowly and completely after each repetition.  Repeat __________ times. Complete this exercise __________ times a day.  Exercise G: Hamstring, Isometric  1. Lie on your back on a firm surface.  2. Bend your left / right knee approximately __________ degrees.  3. Dig your left / right heel into the surface as if you are trying to pull it toward your buttocks. Tighten the muscles in the back of your thighs to dig as hard as you can without increasing any pain.  4. Hold this position for __________ seconds.  5. Release the tension gradually and allow your muscles to relax completely for __________ seconds after each repetition.  Repeat __________ times. Complete this exercise __________ times a day.  Exercise H: Hamstring Curls   If told by your health care provider, do this exercise while wearing ankle weights. Begin with __________ weights. Then increase the weight by 1 lb (0.5 kg) increments. Do not wear ankle weights that are more than __________.  1. Lie on your abdomen with your legs straight.  2. Bend your left / right knee as far as you can without feeling pain. Keep your hips flat against the floor.  3. Hold this position for __________ seconds.  4. Slowly lower your leg to the starting position.  Repeat __________ times. Complete this exercise __________ times a day.  Exercise I: Squats (Quadriceps)  1.  front of a table, with your feet and knees pointing straight ahead. You may rest your hands on the table for balance but not for support.  2. Slowly bend your knees and lower your hips like you are going to sit in a chair.  ¨ Keep your weight over your heels, not over your toes.  ¨ Keep your lower legs upright so they are parallel with the table  legs.  ¨ Do not let your hips go lower than your knees.  ¨ Do not bend lower than told by your health care provider.  ¨ If your knee pain increases, do not bend as low.  3. Hold the squat position for __________ seconds.  4. Slowly push with your legs to return to standing. Do not use your hands to pull yourself to standing.  Repeat __________ times. Complete this exercise __________ times a day.  Exercise J: Wall Slides (Quadriceps)   1. Lean your back against a smooth wall or door while you walk your feet out 18-24 inches (46-61 cm) from it.  2. Place your feet hip-width apart.  3. Slowly slide down the wall or door until your knees bend __________ degrees. Keep your knees over your heels, not over your toes. Keep your knees in line with your hips.  4. Hold for __________ seconds.  Repeat __________ times. Complete this exercise __________ times a day.  Exercise K: Straight Leg Raises - Hip Abductors  1. Lie on your side with your left / right leg in the top position. Lie so your head, shoulder, knee, and hip line up. You may bend your bottom knee to help you keep your balance.  2. Roll your hips slightly forward so your hips are stacked directly over each other and your left / right knee is facing forward.  3. Leading with your heel, lift your top leg 4-6 inches (10-15 cm). You should feel the muscles in your outer hip lifting.  ¨ Do not let your foot drift forward.  ¨ Do not let your knee roll toward the ceiling.  4. Hold this position for __________ seconds.  5. Slowly return your leg to the starting position.  6. Let your muscles relax completely after each repetition.  Repeat __________ times. Complete this exercise __________ times a day.  Exercise L: Straight Leg Raises - Hip Extensors  1. Lie on your abdomen on a firm surface. You can put a pillow under your hips if that is more comfortable.  2. Tense the muscles in your buttocks and lift your left / right leg about 4-6 inches (10-15 cm). Keep your knee  straight as you lift your leg.  3. Hold this position for __________ seconds.  4. Slowly lower your leg to the starting position.  5. Let your leg relax completely after each repetition.  Repeat __________ times. Complete this exercise __________ times a day.  This information is not intended to replace advice given to you by your health care provider. Make sure you discuss any questions you have with your health care provider.  Document Released: 11/01/2006 Document Revised: 09/11/2017 Document Reviewed: 10/23/2016  [employer]vier Interactive Patient Education © 2017 [employer]

## 2018-01-23 NOTE — TELEPHONE ENCOUNTER
She did have a refill on the bottle but when she got to the pharmacy they told her that the refill had

## 2018-01-29 ENCOUNTER — TELEPHONE (OUTPATIENT)
Dept: FAMILY MEDICINE CLINIC | Facility: CLINIC | Age: 53
End: 2018-01-29

## 2018-01-29 NOTE — TELEPHONE ENCOUNTER
----- Message from Raiza Saini MD sent at 1/26/2018 12:30 PM CST -----  Please let her know that her x-ray did show worsened arthritis.  Isn't severe, but if pain doesn't get better we can consider PT and or injection

## 2018-04-03 DIAGNOSIS — M54.6 LEFT-SIDED THORACIC BACK PAIN, UNSPECIFIED CHRONICITY: ICD-10-CM

## 2018-04-03 RX ORDER — OMEPRAZOLE 20 MG/1
20 CAPSULE, DELAYED RELEASE ORAL DAILY
Qty: 90 CAPSULE | Refills: 0 | Status: SHIPPED | OUTPATIENT
Start: 2018-04-03 | End: 2018-06-25 | Stop reason: SDUPTHER

## 2018-04-03 RX ORDER — NAPROXEN 375 MG/1
TABLET ORAL
Qty: 30 TABLET | Refills: 0 | Status: SHIPPED | OUTPATIENT
Start: 2018-04-03 | End: 2018-04-16 | Stop reason: SDUPTHER

## 2018-04-16 DIAGNOSIS — M54.6 LEFT-SIDED THORACIC BACK PAIN, UNSPECIFIED CHRONICITY: ICD-10-CM

## 2018-04-16 RX ORDER — NAPROXEN 375 MG/1
TABLET ORAL
Qty: 30 TABLET | Refills: 0 | Status: SHIPPED | OUTPATIENT
Start: 2018-04-16 | End: 2018-05-03 | Stop reason: SDUPTHER

## 2018-04-23 RX ORDER — TIZANIDINE 4 MG/1
TABLET ORAL
Qty: 120 TABLET | Refills: 0 | Status: SHIPPED | OUTPATIENT
Start: 2018-04-23 | End: 2018-07-20 | Stop reason: SDUPTHER

## 2018-05-03 DIAGNOSIS — M54.6 LEFT-SIDED THORACIC BACK PAIN, UNSPECIFIED CHRONICITY: ICD-10-CM

## 2018-05-03 RX ORDER — NAPROXEN 375 MG/1
TABLET ORAL
Qty: 30 TABLET | Refills: 0 | Status: SHIPPED | OUTPATIENT
Start: 2018-05-03 | End: 2018-05-22 | Stop reason: SDUPTHER

## 2018-05-10 ENCOUNTER — TELEPHONE (OUTPATIENT)
Dept: FAMILY MEDICINE CLINIC | Facility: CLINIC | Age: 53
End: 2018-05-10

## 2018-05-10 RX ORDER — ESCITALOPRAM OXALATE 10 MG/1
10 TABLET ORAL DAILY
Qty: 90 TABLET | Refills: 0 | Status: SHIPPED | OUTPATIENT
Start: 2018-05-10 | End: 2018-06-25 | Stop reason: SDUPTHER

## 2018-05-10 NOTE — TELEPHONE ENCOUNTER
Patient has called and said she is out of refills on her escitalopram (LEXAPRO) 10 MG tablet. She said she took her last one last night. [facility]. Call her at [phone]

## 2018-05-18 RX ORDER — TIZANIDINE 4 MG/1
TABLET ORAL
Qty: 120 TABLET | Refills: 0 | Status: SHIPPED | OUTPATIENT
Start: 2018-05-18 | End: 2018-05-22

## 2018-05-18 RX ORDER — METHIMAZOLE 5 MG/1
TABLET ORAL
Qty: 30 TABLET | Refills: 0 | Status: SHIPPED | OUTPATIENT
Start: 2018-05-18 | End: 2018-07-15 | Stop reason: SDUPTHER

## 2018-05-22 ENCOUNTER — APPOINTMENT (OUTPATIENT)
Dept: LAB | Facility: HOSPITAL | Age: 53
End: 2018-05-22

## 2018-05-22 ENCOUNTER — OFFICE VISIT (OUTPATIENT)
Dept: FAMILY MEDICINE CLINIC | Facility: CLINIC | Age: 53
End: 2018-05-22

## 2018-05-22 VITALS
DIASTOLIC BLOOD PRESSURE: 74 MMHG | SYSTOLIC BLOOD PRESSURE: 114 MMHG | HEIGHT: 65 IN | BODY MASS INDEX: 24.04 KG/M2 | WEIGHT: 144.3 LBS

## 2018-05-22 DIAGNOSIS — M54.6 LEFT-SIDED THORACIC BACK PAIN, UNSPECIFIED CHRONICITY: ICD-10-CM

## 2018-05-22 DIAGNOSIS — F41.1 GENERALIZED ANXIETY DISORDER: Primary | ICD-10-CM

## 2018-05-22 PROBLEM — M43.16 SPONDYLOLISTHESIS OF LUMBAR REGION: Status: ACTIVE | Noted: 2017-08-08

## 2018-05-22 LAB
25(OH)D3 SERPL-MCNC: 45.3 NG/ML (ref 30–100)
ALBUMIN SERPL-MCNC: 4.2 G/DL (ref 3.4–4.8)
ALBUMIN/GLOB SERPL: 1.2 G/DL (ref 1.1–1.8)
ALP SERPL-CCNC: 49 U/L (ref 38–126)
ALT SERPL W P-5'-P-CCNC: 34 U/L (ref 9–52)
ANION GAP SERPL CALCULATED.3IONS-SCNC: 10 MMOL/L (ref 5–15)
AST SERPL-CCNC: 44 U/L (ref 14–36)
BASOPHILS # BLD AUTO: 0.05 10*3/MM3 (ref 0–0.2)
BASOPHILS NFR BLD AUTO: 1.1 % (ref 0–2)
BILIRUB SERPL-MCNC: 0.5 MG/DL (ref 0.2–1.3)
BUN BLD-MCNC: 11 MG/DL (ref 7–21)
BUN/CREAT SERPL: 20 (ref 7–25)
CALCIUM SPEC-SCNC: 9.2 MG/DL (ref 8.4–10.2)
CHLORIDE SERPL-SCNC: 101 MMOL/L (ref 95–110)
CO2 SERPL-SCNC: 28 MMOL/L (ref 22–31)
CREAT BLD-MCNC: 0.55 MG/DL (ref 0.5–1)
DEPRECATED RDW RBC AUTO: 42.6 FL (ref 36.4–46.3)
EOSINOPHIL # BLD AUTO: 0.15 10*3/MM3 (ref 0–0.7)
EOSINOPHIL NFR BLD AUTO: 3.2 % (ref 0–7)
ERYTHROCYTE [DISTWIDTH] IN BLOOD BY AUTOMATED COUNT: 12.4 % (ref 11.5–14.5)
GFR SERPL CREATININE-BSD FRML MDRD: 117 ML/MIN/1.73 (ref 51–120)
GLOBULIN UR ELPH-MCNC: 3.4 GM/DL (ref 2.3–3.5)
GLUCOSE BLD-MCNC: 101 MG/DL (ref 60–100)
HCT VFR BLD AUTO: 40.6 % (ref 35–45)
HGB BLD-MCNC: 13.7 G/DL (ref 12–15.5)
IMM GRANULOCYTES # BLD: 0.01 10*3/MM3 (ref 0–0.02)
IMM GRANULOCYTES NFR BLD: 0.2 % (ref 0–0.5)
LYMPHOCYTES # BLD AUTO: 1.51 10*3/MM3 (ref 0.6–4.2)
LYMPHOCYTES NFR BLD AUTO: 32.1 % (ref 10–50)
MCH RBC QN AUTO: 31.7 PG (ref 26.5–34)
MCHC RBC AUTO-ENTMCNC: 33.7 G/DL (ref 31.4–36)
MCV RBC AUTO: 94 FL (ref 80–98)
MONOCYTES # BLD AUTO: 0.32 10*3/MM3 (ref 0–0.9)
MONOCYTES NFR BLD AUTO: 6.8 % (ref 0–12)
NEUTROPHILS # BLD AUTO: 2.67 10*3/MM3 (ref 2–8.6)
NEUTROPHILS NFR BLD AUTO: 56.6 % (ref 37–80)
NRBC BLD MANUAL-RTO: 0 /100 WBC (ref 0–0)
PLATELET # BLD AUTO: 344 10*3/MM3 (ref 150–450)
PMV BLD AUTO: 9.4 FL (ref 8–12)
POTASSIUM BLD-SCNC: 4.5 MMOL/L (ref 3.5–5.1)
PROT SERPL-MCNC: 7.6 G/DL (ref 6.3–8.6)
RBC # BLD AUTO: 4.32 10*6/MM3 (ref 3.77–5.16)
SODIUM BLD-SCNC: 139 MMOL/L (ref 137–145)
TSH SERPL DL<=0.05 MIU/L-ACNC: 1.04 MIU/ML (ref 0.46–4.68)
WBC NRBC COR # BLD: 4.71 10*3/MM3 (ref 3.2–9.8)

## 2018-05-22 PROCEDURE — 82306 VITAMIN D 25 HYDROXY: CPT | Performed by: INTERNAL MEDICINE

## 2018-05-22 PROCEDURE — 36415 COLL VENOUS BLD VENIPUNCTURE: CPT | Performed by: INTERNAL MEDICINE

## 2018-05-22 PROCEDURE — 80050 GENERAL HEALTH PANEL: CPT | Performed by: INTERNAL MEDICINE

## 2018-05-22 PROCEDURE — 99213 OFFICE O/P EST LOW 20 MIN: CPT | Performed by: FAMILY MEDICINE

## 2018-05-22 RX ORDER — NAPROXEN 375 MG/1
TABLET ORAL
Qty: 30 TABLET | Refills: 0 | Status: SHIPPED | OUTPATIENT
Start: 2018-05-22 | End: 2018-05-22 | Stop reason: SDUPTHER

## 2018-05-22 RX ORDER — BIOTIN 1 MG
1000 TABLET ORAL DAILY
COMMUNITY
End: 2018-10-28

## 2018-05-22 NOTE — PROGRESS NOTES
Elizabeth Lopez is a 52 y.o. female.     Anxiety   Presents for follow-up visit. Symptoms include nervous/anxious behavior. Patient reports no chest pain, compulsions, confusion, decreased concentration, depressed mood, dizziness, dry mouth, excessive worry, feeling of choking, hyperventilation, impotence, insomnia, irritability, malaise, muscle tension, nausea, obsessions, palpitations, panic, restlessness, shortness of breath or suicidal ideas. Symptoms occur occasionally. The severity of symptoms is mild. The quality of sleep is fair. Nighttime awakenings: occasional.     Compliance with medications is %.        Current Outpatient Prescriptions:   •  aspirin 81 MG chewable tablet, Chew 81 mg daily., Disp: , Rfl:   •  B-D UF III MINI PEN NEEDLES 31G X 5 MM misc, USE AS DIRECTED, Disp: 50 each, Rfl: 2  •  Biotin 1000 MCG tablet, Take 1,000 mcg by mouth Daily., Disp: , Rfl:   •  calcium carbonate 1250 MG capsule, Take 600 mg by mouth., Disp: , Rfl:   •  clotrimazole (LOTRIMIN) 1 % cream, Apply  topically 2 (Two) Times a Day. Apply BID until rash on hand disappears and apply additional 2 days., Disp: 45 g, Rfl: 0  •  escitalopram (LEXAPRO) 10 MG tablet, TAKE 1 TABLET BY MOUTH DAILY, Disp: 90 tablet, Rfl: 0  •  fluticasone (FLONASE) 50 MCG/ACT nasal spray, 2 sprays into each nostril Daily., Disp: , Rfl:   •  FORTEO 600 MCG/2.4ML injection, INJECT 2.4ML SUBQ EVERY DAY AS DIRECTED, Disp: 2.4 mL, Rfl: 3  •  HYDROcodone-acetaminophen (NORCO) 5-325 MG per tablet, Take 1 tablet by mouth 2 (Two) Times a Day As Needed for Moderate Pain ., Disp: 30 tablet, Rfl: 0  •  lidocaine (XYLOCAINE) 5 % ointment, Apply  topically Every 2 (Two) Hours As Needed for Mild Pain (1-3)., Disp: 50 g, Rfl: 1  •  LORazepam (ATIVAN) 0.5 MG tablet, Take 1 tablet by mouth Daily As Needed for Anxiety., Disp: 30 tablet, Rfl: 0  •  magnesium oxide (MAGOX) 400 (241.3 Mg) MG tablet tablet, Take 800 mg by mouth Daily., Disp: , Rfl:   •   methIMAzole (TAPAZOLE) 5 MG tablet, TAKE 1 TABLET BY MOUTH DAILY, Disp: 30 tablet, Rfl: 0  •  metoprolol succinate XL (TOPROL XL) 25 MG 24 hr tablet, Take 12.5 mg by mouth daily., Disp: , Rfl:   •  Multiple Vitamins-Minerals (MULTIVITAMIN PO), Take 1 tablet by mouth daily., Disp: , Rfl:   •  naproxen (NAPROSYN) 375 MG tablet, Take 1 tablet by mouth 2 (Two) Times a Day As Needed for Mild Pain ., Disp: 30 tablet, Rfl: 1  •  nystatin (MYCOSTATIN) 018715 UNIT/GM ointment, Apply  topically 2 (Two) Times a Day. Apply 2 times a day until rash disappears and continue to apply ointment to area for 3 more days., Disp: 30 g, Rfl: 0  •  Omega-3 Fatty Acids (FISH OIL CONCENTRATE) 1000 MG capsule, Take 1 capsule by mouth 2 (two) times a day., Disp: , Rfl:   •  omeprazole (priLOSEC) 20 MG capsule, TAKE 1 CAPSULE BY MOUTH DAILY, Disp: 90 capsule, Rfl: 0  •  tiZANidine (ZANAFLEX) 4 MG tablet, TAKE 1 TABLET BY MOUTH EVERY 6 HOURS AS NEEDED FOR MUSCLE SPASMS, Disp: 120 tablet, Rfl: 0  •  vitamin E 400 UNIT capsule, Take 400 Units by mouth daily., Disp: , Rfl:     The following portions of the patient's history were reviewed and updated as appropriate: allergies, current medications, past family history, past medical history, past social history, past surgical history and problem list.    Review of Systems   Constitutional: Negative for activity change, appetite change, chills, fatigue, fever, irritability and unexpected weight change.   Eyes: Negative for visual disturbance.   Respiratory: Negative for cough, chest tightness, shortness of breath and wheezing.    Cardiovascular: Negative for chest pain, palpitations and leg swelling.   Gastrointestinal: Negative for abdominal distention, abdominal pain, constipation, diarrhea, nausea and vomiting.   Genitourinary: Negative for impotence.   Musculoskeletal: Positive for back pain. Negative for arthralgias, gait problem, joint swelling, myalgias, neck pain and neck stiffness.  "  Neurological: Negative for dizziness, numbness and headaches.   Psychiatric/Behavioral: Negative for confusion, decreased concentration, dysphoric mood, sleep disturbance and suicidal ideas. The patient is nervous/anxious. The patient does not have insomnia.        Objective    Vitals:    05/22/18 1010   BP: 114/74   Weight: 65.5 kg (144 lb 4.8 oz)   Height: 165.1 cm (65\")       Physical Exam   Constitutional: She is oriented to person, place, and time. She appears well-developed and well-nourished. No distress.   Cardiovascular: Normal rate, regular rhythm and normal heart sounds.    No murmur heard.  No LE edema.   Pulmonary/Chest: Effort normal and breath sounds normal. No respiratory distress.   Abdominal: Soft. Bowel sounds are normal. She exhibits no distension. There is no tenderness.   Neurological: She is alert and oriented to person, place, and time.   Psychiatric: She has a normal mood and affect. Her behavior is normal. Judgment and thought content normal.   Nursing note and vitals reviewed.      Assessment/Plan   Problems Addressed this Visit        Other    Generalized anxiety disorder - Primary        Doing well on current medications. Continue lexapro and mindfulness exercises. Seems to be working well for her.  RTC in 6 months or sooner PRN           "

## 2018-05-29 ENCOUNTER — OFFICE VISIT (OUTPATIENT)
Dept: ENDOCRINOLOGY | Facility: CLINIC | Age: 53
End: 2018-05-29

## 2018-05-29 VITALS
HEIGHT: 65 IN | WEIGHT: 145.8 LBS | HEART RATE: 63 BPM | OXYGEN SATURATION: 98 % | DIASTOLIC BLOOD PRESSURE: 66 MMHG | SYSTOLIC BLOOD PRESSURE: 112 MMHG | BODY MASS INDEX: 24.29 KG/M2

## 2018-05-29 DIAGNOSIS — N95.1 MENOPAUSAL SYNDROME (HOT FLASHES): ICD-10-CM

## 2018-05-29 DIAGNOSIS — M81.0 AGE-RELATED OSTEOPOROSIS WITHOUT CURRENT PATHOLOGICAL FRACTURE: ICD-10-CM

## 2018-05-29 DIAGNOSIS — E55.9 VITAMIN D DEFICIENCY: ICD-10-CM

## 2018-05-29 DIAGNOSIS — E05.90 HYPERTHYROIDISM: Primary | ICD-10-CM

## 2018-05-29 PROCEDURE — 99214 OFFICE O/P EST MOD 30 MIN: CPT | Performed by: INTERNAL MEDICINE

## 2018-06-04 DIAGNOSIS — M54.6 LEFT-SIDED THORACIC BACK PAIN, UNSPECIFIED CHRONICITY: ICD-10-CM

## 2018-06-04 RX ORDER — NAPROXEN 375 MG/1
TABLET ORAL
Qty: 30 TABLET | Refills: 0 | Status: SHIPPED | OUTPATIENT
Start: 2018-06-04 | End: 2018-06-19 | Stop reason: SDUPTHER

## 2018-06-18 RX ORDER — TIZANIDINE 4 MG/1
TABLET ORAL
Qty: 120 TABLET | Refills: 0 | Status: SHIPPED | OUTPATIENT
Start: 2018-06-18 | End: 2018-07-20 | Stop reason: SDUPTHER

## 2018-06-19 DIAGNOSIS — M54.6 LEFT-SIDED THORACIC BACK PAIN, UNSPECIFIED CHRONICITY: ICD-10-CM

## 2018-06-19 RX ORDER — NAPROXEN 375 MG/1
TABLET ORAL
Qty: 30 TABLET | Refills: 0 | Status: SHIPPED | OUTPATIENT
Start: 2018-06-19 | End: 2018-06-25 | Stop reason: SDUPTHER

## 2018-06-21 ENCOUNTER — TELEPHONE (OUTPATIENT)
Dept: FAMILY MEDICINE CLINIC | Facility: CLINIC | Age: 53
End: 2018-06-21

## 2018-06-21 DIAGNOSIS — M54.6 LEFT-SIDED THORACIC BACK PAIN, UNSPECIFIED CHRONICITY: ICD-10-CM

## 2018-06-21 NOTE — TELEPHONE ENCOUNTER
Pt needs refills of ativan, lexapro, prilosec, and naproxen. She does not see Marti until November. If you could call in enough until then she would appreciate it.

## 2018-06-22 NOTE — TELEPHONE ENCOUNTER
Dr. EBENEZER Saini,     Do you want me to refill until her appt in November??     Please Advise on the Ativan Script.      Thank you

## 2018-06-25 RX ORDER — ESCITALOPRAM OXALATE 10 MG/1
10 TABLET ORAL DAILY
Qty: 90 TABLET | Refills: 1 | Status: SHIPPED | OUTPATIENT
Start: 2018-06-25 | End: 2019-02-04 | Stop reason: SDUPTHER

## 2018-06-25 RX ORDER — NAPROXEN 375 MG/1
375 TABLET ORAL 2 TIMES DAILY WITH MEALS
Qty: 60 TABLET | Refills: 3 | Status: SHIPPED | OUTPATIENT
Start: 2018-06-25 | End: 2019-02-25 | Stop reason: SDUPTHER

## 2018-06-25 RX ORDER — OMEPRAZOLE 20 MG/1
20 CAPSULE, DELAYED RELEASE ORAL DAILY
Qty: 90 CAPSULE | Refills: 1 | Status: SHIPPED | OUTPATIENT
Start: 2018-06-25 | End: 2019-01-09 | Stop reason: SDUPTHER

## 2018-07-05 ENCOUNTER — TELEPHONE (OUTPATIENT)
Dept: FAMILY MEDICINE CLINIC | Facility: CLINIC | Age: 53
End: 2018-07-05

## 2018-07-06 DIAGNOSIS — M54.6 LEFT-SIDED THORACIC BACK PAIN, UNSPECIFIED CHRONICITY: ICD-10-CM

## 2018-07-06 RX ORDER — OMEPRAZOLE 20 MG/1
20 CAPSULE, DELAYED RELEASE ORAL DAILY
Qty: 90 CAPSULE | Refills: 0 | Status: SHIPPED | OUTPATIENT
Start: 2018-07-06 | End: 2018-10-28

## 2018-07-06 RX ORDER — NAPROXEN 375 MG/1
TABLET ORAL
Qty: 30 TABLET | Refills: 0 | Status: SHIPPED | OUTPATIENT
Start: 2018-07-06 | End: 2018-10-28

## 2018-07-09 ENCOUNTER — LAB (OUTPATIENT)
Dept: LAB | Facility: HOSPITAL | Age: 53
End: 2018-07-09

## 2018-07-09 DIAGNOSIS — E05.90 HYPERTHYROIDISM: ICD-10-CM

## 2018-07-09 DIAGNOSIS — E55.9 VITAMIN D DEFICIENCY: ICD-10-CM

## 2018-07-09 LAB
T4 FREE SERPL-MCNC: 1.06 NG/DL (ref 0.78–2.19)
TSH SERPL DL<=0.05 MIU/L-ACNC: 0.4 MIU/ML (ref 0.46–4.68)

## 2018-07-09 PROCEDURE — 84443 ASSAY THYROID STIM HORMONE: CPT

## 2018-07-09 PROCEDURE — 84481 FREE ASSAY (FT-3): CPT

## 2018-07-09 PROCEDURE — 36415 COLL VENOUS BLD VENIPUNCTURE: CPT

## 2018-07-09 PROCEDURE — 84439 ASSAY OF FREE THYROXINE: CPT

## 2018-07-11 LAB — T3FREE SERPL-MCNC: 3.3 PG/ML (ref 2–4.4)

## 2018-07-15 DIAGNOSIS — M81.0 AGE-RELATED OSTEOPOROSIS WITHOUT CURRENT PATHOLOGICAL FRACTURE: ICD-10-CM

## 2018-07-15 DIAGNOSIS — E05.90 HYPERTHYROIDISM: Primary | ICD-10-CM

## 2018-07-15 RX ORDER — METHIMAZOLE 5 MG/1
TABLET ORAL
Qty: 45 TABLET | Refills: 11 | Status: SHIPPED | OUTPATIENT
Start: 2018-07-15 | End: 2018-11-20

## 2018-07-20 RX ORDER — TIZANIDINE 4 MG/1
4 TABLET ORAL EVERY 6 HOURS PRN
Qty: 120 TABLET | Refills: 0 | Status: SHIPPED | OUTPATIENT
Start: 2018-07-20 | End: 2018-08-16 | Stop reason: SDUPTHER

## 2018-07-24 ENCOUNTER — INFUSION (OUTPATIENT)
Dept: ONCOLOGY | Facility: HOSPITAL | Age: 53
End: 2018-07-24

## 2018-07-24 DIAGNOSIS — M81.0 AGE-RELATED OSTEOPOROSIS WITHOUT CURRENT PATHOLOGICAL FRACTURE: ICD-10-CM

## 2018-07-24 DIAGNOSIS — M81.0 AGE-RELATED OSTEOPOROSIS WITHOUT CURRENT PATHOLOGICAL FRACTURE: Primary | ICD-10-CM

## 2018-07-24 DIAGNOSIS — E55.9 VITAMIN D DEFICIENCY: ICD-10-CM

## 2018-07-24 DIAGNOSIS — E05.90 HYPERTHYROIDISM: Primary | ICD-10-CM

## 2018-07-24 LAB
25(OH)D3 SERPL-MCNC: 54.4 NG/ML (ref 30–100)
ALBUMIN SERPL-MCNC: 4.3 G/DL (ref 3.4–4.8)
ALBUMIN/GLOB SERPL: 1.4 G/DL (ref 1.1–1.8)
ALP SERPL-CCNC: 44 U/L (ref 38–126)
ALT SERPL W P-5'-P-CCNC: 32 U/L (ref 9–52)
ANION GAP SERPL CALCULATED.3IONS-SCNC: 8 MMOL/L (ref 5–15)
AST SERPL-CCNC: 36 U/L (ref 14–36)
BASOPHILS # BLD AUTO: 0.06 10*3/MM3 (ref 0–0.2)
BASOPHILS NFR BLD AUTO: 1 % (ref 0–2)
BILIRUB SERPL-MCNC: 0.6 MG/DL (ref 0.2–1.3)
BUN BLD-MCNC: 8 MG/DL (ref 7–21)
BUN/CREAT SERPL: 15.1 (ref 7–25)
CALCIUM SPEC-SCNC: 8.3 MG/DL (ref 8.4–10.2)
CHLORIDE SERPL-SCNC: 103 MMOL/L (ref 95–110)
CO2 SERPL-SCNC: 26 MMOL/L (ref 22–31)
CREAT BLD-MCNC: 0.53 MG/DL (ref 0.5–1)
DEPRECATED RDW RBC AUTO: 41 FL (ref 36.4–46.3)
EOSINOPHIL # BLD AUTO: 0.16 10*3/MM3 (ref 0–0.7)
EOSINOPHIL NFR BLD AUTO: 2.6 % (ref 0–7)
ERYTHROCYTE [DISTWIDTH] IN BLOOD BY AUTOMATED COUNT: 12 % (ref 11.5–14.5)
GFR SERPL CREATININE-BSD FRML MDRD: 122 ML/MIN/1.73 (ref 51–120)
GLOBULIN UR ELPH-MCNC: 3 GM/DL (ref 2.3–3.5)
GLUCOSE BLD-MCNC: 87 MG/DL (ref 60–100)
HCT VFR BLD AUTO: 40.4 % (ref 35–45)
HGB BLD-MCNC: 13.7 G/DL (ref 12–15.5)
IMM GRANULOCYTES # BLD: 0.01 10*3/MM3 (ref 0–0.02)
IMM GRANULOCYTES NFR BLD: 0.2 % (ref 0–0.5)
LYMPHOCYTES # BLD AUTO: 1.46 10*3/MM3 (ref 0.6–4.2)
LYMPHOCYTES NFR BLD AUTO: 23.9 % (ref 10–50)
MCH RBC QN AUTO: 31.2 PG (ref 26.5–34)
MCHC RBC AUTO-ENTMCNC: 33.9 G/DL (ref 31.4–36)
MCV RBC AUTO: 92 FL (ref 80–98)
MONOCYTES # BLD AUTO: 0.38 10*3/MM3 (ref 0–0.9)
MONOCYTES NFR BLD AUTO: 6.2 % (ref 0–12)
NEUTROPHILS # BLD AUTO: 4.04 10*3/MM3 (ref 2–8.6)
NEUTROPHILS NFR BLD AUTO: 66.1 % (ref 37–80)
PLATELET # BLD AUTO: 330 10*3/MM3 (ref 150–450)
PMV BLD AUTO: 9.2 FL (ref 8–12)
POTASSIUM BLD-SCNC: 4.3 MMOL/L (ref 3.5–5.1)
PROT SERPL-MCNC: 7.3 G/DL (ref 6.3–8.6)
RBC # BLD AUTO: 4.39 10*6/MM3 (ref 3.77–5.16)
SODIUM BLD-SCNC: 137 MMOL/L (ref 137–145)
T4 FREE SERPL-MCNC: 0.9 NG/DL (ref 0.78–2.19)
TSH SERPL DL<=0.05 MIU/L-ACNC: 0.23 MIU/ML (ref 0.46–4.68)
WBC NRBC COR # BLD: 6.11 10*3/MM3 (ref 3.2–9.8)

## 2018-07-24 PROCEDURE — 82306 VITAMIN D 25 HYDROXY: CPT | Performed by: INTERNAL MEDICINE

## 2018-07-24 PROCEDURE — 80050 GENERAL HEALTH PANEL: CPT | Performed by: INTERNAL MEDICINE

## 2018-07-24 PROCEDURE — 36415 COLL VENOUS BLD VENIPUNCTURE: CPT | Performed by: NURSE PRACTITIONER

## 2018-07-24 PROCEDURE — 84439 ASSAY OF FREE THYROXINE: CPT | Performed by: INTERNAL MEDICINE

## 2018-07-24 NOTE — PROGRESS NOTES
Held prolia injection due to abnormal lab results. Hypocalcemia and elevated GFR. Left message with Dr. Elizabeth's nurse. Pt sates she takes daily calcium supplement as directed by MD.

## 2018-08-15 ENCOUNTER — TELEPHONE (OUTPATIENT)
Dept: FAMILY MEDICINE CLINIC | Facility: CLINIC | Age: 53
End: 2018-08-15

## 2018-08-16 ENCOUNTER — TRANSCRIBE ORDERS (OUTPATIENT)
Dept: OCCUPATIONAL THERAPY | Facility: HOSPITAL | Age: 53
End: 2018-08-16

## 2018-08-16 DIAGNOSIS — M79.642 LEFT HAND PAIN: Primary | ICD-10-CM

## 2018-08-16 RX ORDER — TIZANIDINE 4 MG/1
TABLET ORAL
Qty: 120 TABLET | Refills: 0 | Status: SHIPPED | OUTPATIENT
Start: 2018-08-16 | End: 2018-09-15 | Stop reason: SDUPTHER

## 2018-08-29 ENCOUNTER — APPOINTMENT (OUTPATIENT)
Dept: OCCUPATIONAL THERAPY | Facility: HOSPITAL | Age: 53
End: 2018-08-29

## 2018-09-07 ENCOUNTER — HOSPITAL ENCOUNTER (OUTPATIENT)
Dept: NUCLEAR MEDICINE | Facility: HOSPITAL | Age: 53
Discharge: HOME | End: 2018-09-07

## 2018-09-07 PROCEDURE — A9517 I131 IODIDE CAP, RX: HCPCS | Performed by: INTERNAL MEDICINE

## 2018-09-07 PROCEDURE — 0 SODIUM IODIDE CAPSULE: Performed by: INTERNAL MEDICINE

## 2018-09-07 PROCEDURE — 79005 NUCLEAR RX ORAL ADMIN: CPT

## 2018-09-07 RX ADMIN — SODIUM IODIDE I 131 1 CAPSULE: 100 CAPSULE ORAL at 13:20

## 2018-09-10 ENCOUNTER — OFFICE VISIT (OUTPATIENT)
Dept: PODIATRY | Facility: CLINIC | Age: 53
End: 2018-09-10

## 2018-09-10 VITALS
DIASTOLIC BLOOD PRESSURE: 67 MMHG | HEART RATE: 66 BPM | OXYGEN SATURATION: 98 % | BODY MASS INDEX: 24.66 KG/M2 | HEIGHT: 65 IN | WEIGHT: 148 LBS | SYSTOLIC BLOOD PRESSURE: 102 MMHG

## 2018-09-10 DIAGNOSIS — B35.1 ONYCHOMYCOSIS: ICD-10-CM

## 2018-09-10 DIAGNOSIS — L60.2 ONYCHOGRYPHOSIS: Primary | ICD-10-CM

## 2018-09-10 DIAGNOSIS — M79.674 PAIN OF TOE OF RIGHT FOOT: ICD-10-CM

## 2018-09-10 PROCEDURE — 99203 OFFICE O/P NEW LOW 30 MIN: CPT | Performed by: PODIATRIST

## 2018-09-10 NOTE — PROGRESS NOTES
Nerissa Lopez  1965  52 y.o. female    09/10/2018  Chief Complaint   Patient presents with   • Right Foot - Nail Problem           History of Present Illness    Nerissa Lopez is a 52 y.o. female who presents for evaluation of right great toe pain.  She states that she has a significant history of fungal infection of her right big toenail.  She was treating this with over-the-counter topical medications followed by prescription topical medications for several months.  She does feel that it was improving, but that the nail was still somewhat loose.  She states that a few months ago she snagged her nail on a ladder rung and ripped it off.  Subsequently it has grown back thickened and seems to be growing into the skin at the end of her toe.  This causes mild discomfort, especially in close toed shoe gear.  She denies significant redness or drainage.  She denies any prior infections to the site.        Past Medical History:   Diagnosis Date   • Anal fistula    • Anemia    • Astigmatism    • Broken heart syndrome 03/02/2016    when mother passed away   • Chronic pain disorder    • Death of family member     Mother passes away.   • Generalized anxiety disorder    • GERD (gastroesophageal reflux disease)    • Goiter    • Graves disease    • Heartburn    • Hematuria    • Hyperthyroidism    • Lesion of eyelid     LLL   • Low back pain    • Lumbosacral spondylosis    • Menopausal syndrome    • Myopia    • Osteoporosis    • Pain in back     Lumbar region   • Pain in joint involving left lower leg    • Pain in wrist    • Panic attack    • Presbyopia    • Right upper quadrant pain    • Takotsubo cardiomyopathy    • Thoracic spondylosis          Past Surgical History:   Procedure Laterality Date   • CARDIAC CATHETERIZATION  03/02/2016    Normal coronaries with no obstructive disease. Nonischemic stress induced cardiomyopathy known as Takotsubo syndrome.   • COLONOSCOPY  01/30/2016    Removal of anal fistula   • CYST REMOVAL  Right     right breast   • EYELID CARCINOMA EXCISION  06/15/2015   • HYSTERECTOMY  04/14/2014    Total abdominal hysterectomy, bilateral salpingo-oophorectomy. Menorrhagia and leiomyomatous uterus.   • SKIN BIOPSY  06/29/2014   • TUBAL ABDOMINAL LIGATION     • WOUND CLOSURE  07/20/2014    Layer closure of wound.   • WOUND CLOSURE  01/16/2014    Layer closure of wound         Family History   Problem Relation Age of Onset   • Heart disease Mother    • Hyperlipidemia Mother    • Hypertension Mother    • Osteoporosis Mother    • Cancer Mother    • Cancer Father    • Diabetes Sister    • Thyroid disease Sister    • COPD Sister    • Hypertension Sister    • Migraines Sister    • Diabetes Brother    • COPD Brother    • Cancer Maternal Grandmother         Ovarian Cancer   • Breast cancer Other    • Cancer Other    • Other Other         Gall Bladder disease   • Breast cancer Other    • Breast cancer Maternal Aunt          Social History     Social History   • Marital status:      Spouse name: N/A   • Number of children: N/A   • Years of education: N/A     Occupational History   • Not on file.     Social History Main Topics   • Smoking status: Never Smoker   • Smokeless tobacco: Never Used   • Alcohol use No   • Drug use: No   • Sexual activity: Defer     Other Topics Concern   • Not on file     Social History Narrative   • No narrative on file         Current Outpatient Prescriptions   Medication Sig Dispense Refill   • aspirin 81 MG chewable tablet Chew 81 mg daily.     • B-D UF III MINI PEN NEEDLES 31G X 5 MM misc USE AS DIRECTED 50 each 2   • Biotin 1000 MCG tablet Take 1,000 mcg by mouth Daily.     • calcium carbonate 1250 MG capsule Take 600 mg by mouth.     • clotrimazole (LOTRIMIN) 1 % cream Apply  topically 2 (Two) Times a Day. Apply BID until rash on hand disappears and apply additional 2 days. 45 g 0   • escitalopram (LEXAPRO) 10 MG tablet Take 1 tablet by mouth Daily. 90 tablet 1   • fluconazole (DIFLUCAN)  200 MG tablet Take 1 tablet by mouth Daily. 2 tablet 0   • fluticasone (FLONASE) 50 MCG/ACT nasal spray 2 sprays into each nostril Daily.     • FORTEO 600 MCG/2.4ML injection INJECT 2.4ML SUBQ EVERY DAY AS DIRECTED 2.4 mL 3   • HYDROcodone-acetaminophen (NORCO) 5-325 MG per tablet Take 1 tablet by mouth 2 (Two) Times a Day As Needed for Moderate Pain . 30 tablet 0   • lidocaine (XYLOCAINE) 5 % ointment Apply  topically Every 2 (Two) Hours As Needed for Mild Pain (1-3). 50 g 1   • LORazepam (ATIVAN) 0.5 MG tablet Take 1 tablet by mouth Daily As Needed for Anxiety. 30 tablet 0   • magnesium oxide (MAGOX) 400 (241.3 Mg) MG tablet tablet Take 800 mg by mouth Daily.     • methIMAzole (TAPAZOLE) 5 MG tablet Alternate 2 tabs one day with 1 tab the other day. 45 tablet 11   • metoprolol succinate XL (TOPROL XL) 25 MG 24 hr tablet Take 12.5 mg by mouth daily.     • Multiple Vitamins-Minerals (MULTIVITAMIN PO) Take 1 tablet by mouth daily.     • naproxen (NAPROSYN) 375 MG tablet Take 1 tablet by mouth 2 (Two) Times a Day With Meals. 60 tablet 3   • naproxen (NAPROSYN) 375 MG tablet TAKE 1 TABLET BY MOUTH TWICE DAILY AS NEEDED FOR MILD PAIN.(1-3 ON PAIN SCALE) 30 tablet 0   • nystatin (MYCOSTATIN) 139470 UNIT/GM ointment Apply  topically 2 (Two) Times a Day. Apply 2 times a day until rash disappears and continue to apply ointment to area for 3 more days. 30 g 0   • Omega-3 Fatty Acids (FISH OIL CONCENTRATE) 1000 MG capsule Take 1 capsule by mouth 2 (two) times a day.     • omeprazole (priLOSEC) 20 MG capsule Take 1 capsule by mouth Daily. 90 capsule 1   • omeprazole (priLOSEC) 20 MG capsule TAKE 1 CAPSULE BY MOUTH DAILY 90 capsule 0   • phenazopyridine (PYRIDIUM) 100 MG tablet Take 1 tablet by mouth 3 (Three) Times a Day As Needed for bladder spasms. 15 tablet 0   • tiZANidine (ZANAFLEX) 4 MG tablet TAKE 1 TABLET BY MOUTH EVERY 6 HOURS AS NEEDED FOR MUSCLE SPASMS 120 tablet 0   • vitamin E 400 UNIT capsule Take 400 Units by  "mouth daily.       No current facility-administered medications for this visit.          OBJECTIVE    /67   Pulse 66   Ht 165.1 cm (65\")   Wt 67.1 kg (148 lb)   LMP  (LMP Unknown)   SpO2 98%   BMI 24.63 kg/m²       Review of Systems   Constitutional: Negative.    HENT: Negative.    Eyes: Negative.    Respiratory: Negative.    Cardiovascular: Negative.    Gastrointestinal: Negative.    Endocrine: Negative.    Genitourinary: Negative.    Musculoskeletal: Positive for arthralgias and back pain.   Skin: Negative.    Allergic/Immunologic: Negative.    Neurological: Negative.    Hematological: Negative.    Psychiatric/Behavioral: The patient is nervous/anxious.          Physical Exam   Constitutional: she appears well-developed and well-nourished.   CV: No chest pain. Normal RR  Resp: Non labored respirations  Psychiatric: she has a normal mood and affect. her behavior is normal.      Lower Extremity Exam:  Vascular: DP/PT pulses palpable 2+.   No hallux edema  Toes warm  Neuro: Protective sensation intact, b/l.  DTRs intact  Integument: No open wounds.  Ingrown distal and medial nail border, right hallux. Mild erythema, No edema. +tenderness to palpation.  Web spaces c/d/i  No skin lesions  Musculoskeletal: LE muscle strength 5/5.   Gait normal  Ankle ROM full without pain or crepitus  STJ ROM full without pain or crepitus  No digital deformities              ASSESSMENT AND PLAN    Nerissa was seen today for nail problem.    Diagnoses and all orders for this visit:    Onychogryphosis    Onychomycosis    Pain of toe of right foot      -Comprehensive foot and ankle exam performed  -Diagnosis, prevention, and treatment of ingrown toe nails discussed with patient, including risks and potential benefits of nail avulsion both temporary and permanent versus simple debridement.  -Patient would like to keep her nail if at all possible.  I stated that removing the nail and allowing it to regrow would not guarantee to " resolve her problem.  We did perform a simple debridement of the offending nail border today and I advised daily soaks and debridement.  -Will consider nail avulsion if this is not resolved her symptoms.  -Recheck as needed            This document has been electronically signed by Harry Tomlin DPM on September 10, 2018 12:28 PM     EMR Dragon/Transcription disclaimer:   Much of this encounter note is an electronic transcription/translation of spoken language to printed text. The electronic translation of spoken language may permit erroneous, or at times, nonsensical words or phrases to be inadvertently transcribed; Although I have reviewed the note for such errors, some may still exist.    Harry Tomlin DPM  9/10/2018  12:28 PM

## 2018-09-12 ENCOUNTER — APPOINTMENT (OUTPATIENT)
Dept: ONCOLOGY | Facility: HOSPITAL | Age: 53
End: 2018-09-12

## 2018-09-13 ENCOUNTER — INFUSION (OUTPATIENT)
Dept: ONCOLOGY | Facility: HOSPITAL | Age: 53
End: 2018-09-13

## 2018-09-13 DIAGNOSIS — M81.0 AGE-RELATED OSTEOPOROSIS WITHOUT CURRENT PATHOLOGICAL FRACTURE: Primary | ICD-10-CM

## 2018-09-13 LAB
CALCIUM SPEC-SCNC: 8.8 MG/DL (ref 8.4–10.2)
MAGNESIUM SERPL-MCNC: 2.3 MG/DL (ref 1.6–2.3)
PHOSPHATE SERPL-MCNC: 4.8 MG/DL (ref 2.4–4.4)

## 2018-09-13 PROCEDURE — 84100 ASSAY OF PHOSPHORUS: CPT | Performed by: NURSE PRACTITIONER

## 2018-09-13 PROCEDURE — 96372 THER/PROPH/DIAG INJ SC/IM: CPT | Performed by: NURSE PRACTITIONER

## 2018-09-13 PROCEDURE — 82310 ASSAY OF CALCIUM: CPT | Performed by: NURSE PRACTITIONER

## 2018-09-13 PROCEDURE — 83735 ASSAY OF MAGNESIUM: CPT | Performed by: NURSE PRACTITIONER

## 2018-09-13 PROCEDURE — 36415 COLL VENOUS BLD VENIPUNCTURE: CPT | Performed by: NURSE PRACTITIONER

## 2018-09-13 PROCEDURE — 25010000002 DENOSUMAB 60 MG/ML SOLUTION: Performed by: INTERNAL MEDICINE

## 2018-09-13 RX ADMIN — DENOSUMAB 60 MG: 60 INJECTION SUBCUTANEOUS at 11:11

## 2018-09-17 RX ORDER — TIZANIDINE 4 MG/1
TABLET ORAL
Qty: 120 TABLET | Refills: 0 | Status: SHIPPED | OUTPATIENT
Start: 2018-09-17 | End: 2019-01-13 | Stop reason: SDUPTHER

## 2018-09-20 ENCOUNTER — TELEPHONE (OUTPATIENT)
Dept: ENDOCRINOLOGY | Facility: CLINIC | Age: 53
End: 2018-09-20

## 2018-09-20 DIAGNOSIS — E55.9 VITAMIN D DEFICIENCY: ICD-10-CM

## 2018-09-20 DIAGNOSIS — E05.90 HYPERTHYROIDISM: Primary | ICD-10-CM

## 2018-09-20 DIAGNOSIS — M81.0 AGE-RELATED OSTEOPOROSIS WITHOUT CURRENT PATHOLOGICAL FRACTURE: ICD-10-CM

## 2018-10-02 ENCOUNTER — TRANSCRIBE ORDERS (OUTPATIENT)
Dept: OCCUPATIONAL THERAPY | Facility: HOSPITAL | Age: 53
End: 2018-10-02

## 2018-10-02 DIAGNOSIS — M79.642 LEFT HAND PAIN: Primary | ICD-10-CM

## 2018-10-04 ENCOUNTER — LAB (OUTPATIENT)
Dept: LAB | Facility: HOSPITAL | Age: 53
End: 2018-10-04

## 2018-10-04 DIAGNOSIS — E05.90 HYPERTHYROIDISM: ICD-10-CM

## 2018-10-04 DIAGNOSIS — M81.0 AGE-RELATED OSTEOPOROSIS WITHOUT CURRENT PATHOLOGICAL FRACTURE: ICD-10-CM

## 2018-10-04 DIAGNOSIS — E55.9 VITAMIN D DEFICIENCY: ICD-10-CM

## 2018-10-04 LAB
25(OH)D3 SERPL-MCNC: 60.2 NG/ML (ref 30–100)
ALBUMIN SERPL-MCNC: 3.9 G/DL (ref 3.4–4.8)
ALBUMIN/GLOB SERPL: 1.1 G/DL (ref 1.1–1.8)
ALP SERPL-CCNC: 69 U/L (ref 38–126)
ALT SERPL W P-5'-P-CCNC: 27 U/L (ref 9–52)
ANION GAP SERPL CALCULATED.3IONS-SCNC: 7 MMOL/L (ref 5–15)
AST SERPL-CCNC: 46 U/L (ref 14–36)
BASOPHILS # BLD AUTO: 0.03 10*3/MM3 (ref 0–0.2)
BASOPHILS NFR BLD AUTO: 0.5 % (ref 0–2)
BILIRUB SERPL-MCNC: 0.4 MG/DL (ref 0.2–1.3)
BUN BLD-MCNC: 10 MG/DL (ref 7–21)
BUN/CREAT SERPL: 17.5 (ref 7–25)
CALCIUM SPEC-SCNC: 8.6 MG/DL (ref 8.4–10.2)
CHLORIDE SERPL-SCNC: 103 MMOL/L (ref 95–110)
CO2 SERPL-SCNC: 27 MMOL/L (ref 22–31)
CREAT BLD-MCNC: 0.57 MG/DL (ref 0.5–1)
DEPRECATED RDW RBC AUTO: 41.8 FL (ref 36.4–46.3)
EOSINOPHIL # BLD AUTO: 0.14 10*3/MM3 (ref 0–0.7)
EOSINOPHIL NFR BLD AUTO: 2.2 % (ref 0–7)
ERYTHROCYTE [DISTWIDTH] IN BLOOD BY AUTOMATED COUNT: 12.3 % (ref 11.5–14.5)
GFR SERPL CREATININE-BSD FRML MDRD: 112 ML/MIN/1.73 (ref 51–120)
GLOBULIN UR ELPH-MCNC: 3.5 GM/DL (ref 2.3–3.5)
GLUCOSE BLD-MCNC: 89 MG/DL (ref 60–100)
HCT VFR BLD AUTO: 38.7 % (ref 35–45)
HGB BLD-MCNC: 13.1 G/DL (ref 12–15.5)
IMM GRANULOCYTES # BLD: 0.01 10*3/MM3 (ref 0–0.02)
IMM GRANULOCYTES NFR BLD: 0.2 % (ref 0–0.5)
LYMPHOCYTES # BLD AUTO: 1.61 10*3/MM3 (ref 0.6–4.2)
LYMPHOCYTES NFR BLD AUTO: 25.6 % (ref 10–50)
MCH RBC QN AUTO: 31.3 PG (ref 26.5–34)
MCHC RBC AUTO-ENTMCNC: 33.9 G/DL (ref 31.4–36)
MCV RBC AUTO: 92.4 FL (ref 80–98)
MONOCYTES # BLD AUTO: 0.34 10*3/MM3 (ref 0–0.9)
MONOCYTES NFR BLD AUTO: 5.4 % (ref 0–12)
NEUTROPHILS # BLD AUTO: 4.16 10*3/MM3 (ref 2–8.6)
NEUTROPHILS NFR BLD AUTO: 66.1 % (ref 37–80)
PHOSPHATE SERPL-MCNC: 3.6 MG/DL (ref 2.4–4.4)
PLATELET # BLD AUTO: 337 10*3/MM3 (ref 150–450)
PMV BLD AUTO: 9.9 FL (ref 8–12)
POTASSIUM BLD-SCNC: 4.9 MMOL/L (ref 3.5–5.1)
PROT SERPL-MCNC: 7.4 G/DL (ref 6.3–8.6)
RBC # BLD AUTO: 4.19 10*6/MM3 (ref 3.77–5.16)
SODIUM BLD-SCNC: 137 MMOL/L (ref 137–145)
T4 FREE SERPL-MCNC: 1.19 NG/DL (ref 0.78–2.19)
TSH SERPL DL<=0.05 MIU/L-ACNC: 0.04 MIU/ML (ref 0.46–4.68)
VIT B12 BLD-MCNC: 759 PG/ML (ref 239–931)
WBC NRBC COR # BLD: 6.29 10*3/MM3 (ref 3.2–9.8)

## 2018-10-04 PROCEDURE — 36415 COLL VENOUS BLD VENIPUNCTURE: CPT

## 2018-10-04 PROCEDURE — 84481 FREE ASSAY (FT-3): CPT

## 2018-10-04 PROCEDURE — 82607 VITAMIN B-12: CPT | Performed by: INTERNAL MEDICINE

## 2018-10-04 PROCEDURE — 82306 VITAMIN D 25 HYDROXY: CPT | Performed by: INTERNAL MEDICINE

## 2018-10-04 PROCEDURE — 84100 ASSAY OF PHOSPHORUS: CPT | Performed by: INTERNAL MEDICINE

## 2018-10-04 PROCEDURE — 80050 GENERAL HEALTH PANEL: CPT | Performed by: INTERNAL MEDICINE

## 2018-10-04 PROCEDURE — 84439 ASSAY OF FREE THYROXINE: CPT

## 2018-10-05 LAB — T3FREE SERPL-MCNC: 4 PG/ML (ref 2–4.4)

## 2018-10-08 ENCOUNTER — OFFICE VISIT (OUTPATIENT)
Dept: ENDOCRINOLOGY | Facility: CLINIC | Age: 53
End: 2018-10-08

## 2018-10-08 VITALS
HEIGHT: 65 IN | WEIGHT: 146 LBS | BODY MASS INDEX: 24.32 KG/M2 | HEART RATE: 62 BPM | DIASTOLIC BLOOD PRESSURE: 80 MMHG | SYSTOLIC BLOOD PRESSURE: 118 MMHG

## 2018-10-08 DIAGNOSIS — E05.90 HYPERTHYROIDISM: ICD-10-CM

## 2018-10-08 DIAGNOSIS — E05.00 GRAVES' DISEASE: Primary | ICD-10-CM

## 2018-10-08 DIAGNOSIS — M81.0 OSTEOPOROSIS, UNSPECIFIED OSTEOPOROSIS TYPE, UNSPECIFIED PATHOLOGICAL FRACTURE PRESENCE: ICD-10-CM

## 2018-10-08 PROCEDURE — 99214 OFFICE O/P EST MOD 30 MIN: CPT | Performed by: NURSE PRACTITIONER

## 2018-10-10 ENCOUNTER — APPOINTMENT (OUTPATIENT)
Dept: OCCUPATIONAL THERAPY | Facility: HOSPITAL | Age: 53
End: 2018-10-10

## 2018-10-15 ENCOUNTER — APPOINTMENT (OUTPATIENT)
Dept: OCCUPATIONAL THERAPY | Facility: HOSPITAL | Age: 53
End: 2018-10-15

## 2018-10-15 RX ORDER — TIZANIDINE 4 MG/1
TABLET ORAL
Qty: 120 TABLET | Refills: 0 | OUTPATIENT
Start: 2018-10-15

## 2018-10-18 ENCOUNTER — HOSPITAL ENCOUNTER (OUTPATIENT)
Dept: OCCUPATIONAL THERAPY | Facility: HOSPITAL | Age: 53
Setting detail: THERAPIES SERIES
Discharge: HOME | End: 2018-10-18

## 2018-10-18 DIAGNOSIS — Z74.09 IMPAIRED MOBILITY AND ACTIVITIES OF DAILY LIVING: Primary | ICD-10-CM

## 2018-10-18 DIAGNOSIS — Z78.9 IMPAIRED INSTRUMENTAL ACTIVITIES OF DAILY LIVING (IADL): ICD-10-CM

## 2018-10-18 DIAGNOSIS — Z78.9 IMPAIRED MOBILITY AND ACTIVITIES OF DAILY LIVING: Primary | ICD-10-CM

## 2018-10-18 DIAGNOSIS — R53.1 WEAKNESS: ICD-10-CM

## 2018-10-18 PROCEDURE — 97166 OT EVAL MOD COMPLEX 45 MIN: CPT

## 2018-10-18 PROCEDURE — G8988 SELF CARE GOAL STATUS: HCPCS

## 2018-10-18 PROCEDURE — G8987 SELF CARE CURRENT STATUS: HCPCS

## 2018-10-18 NOTE — THERAPY EVALUATION
Outpatient Occupational Therapy Rehab Program Initial Evaluation  [facility]     Patient Name: Nerissa Lopez  : 1965  MRN: [id number]  Today's Date: 10/18/2018      Visit Date: 10/18/2018   Visit Number:   % Improvement: N/A  Recert Date: 11/15/2018  MD visit: TBD    Insurance Visits Approved: Pending approval      Patient Active Problem List   Diagnosis   • Hyperthyroidism   • Osteoporosis   • Vitamin D deficiency   • Menopausal syndrome (hot flashes)   • Anal fistula   • Anemia   • Astigmatism   • Death of family member   • Gastroesophageal reflux disease   • Generalized anxiety disorder   • Goiter   • Graves' disease   • Hematuria   • Lesion of eyelid   • Lumbosacral spondylolysis   • Menopausal syndrome   • Myopia   • Lumbar back pain   • Panic attack   • Presbyopia   • Takotsubo cardiomyopathy   • Thoracic spondylosis   • Spondylolisthesis of lumbar region        Past Medical History:   Diagnosis Date   • Anal fistula    • Anemia    • Astigmatism    • Broken heart syndrome 2016    when mother passed away   • Chronic pain disorder    • Death of family member     Mother passes away.   • Generalized anxiety disorder    • GERD (gastroesophageal reflux disease)    • Goiter    • Graves disease    • Heartburn    • Hematuria    • Hyperthyroidism    • Lesion of eyelid     LLL   • Low back pain    • Lumbosacral spondylosis    • Menopausal syndrome    • Myopia    • Osteoporosis    • Pain in back     Lumbar region   • Pain in joint involving left lower leg    • Pain in wrist    • Panic attack    • Presbyopia    • Right upper quadrant pain    • Takotsubo cardiomyopathy    • Thoracic spondylosis         Past Surgical History:   Procedure Laterality Date   • CARDIAC CATHETERIZATION  2016    Normal coronaries with no obstructive disease. Nonischemic stress induced cardiomyopathy known as Takotsubo syndrome.   • COLONOSCOPY  2016    Removal of anal fistula   • CYST REMOVAL Right     right  breast   • EYELID CARCINOMA EXCISION  06/15/2015   • HYSTERECTOMY  04/14/2014    Total abdominal hysterectomy, bilateral salpingo-oophorectomy. Menorrhagia and leiomyomatous uterus.   • SKIN BIOPSY  06/29/2014   • TUBAL ABDOMINAL LIGATION     • WOUND CLOSURE  07/20/2014    Layer closure of wound.   • WOUND CLOSURE  01/16/2014    Layer closure of wound         Visit Dx:    ICD-10-CM ICD-9-CM   1. Impaired mobility and activities of daily living Z74.09 799.89   2. Impaired instrumental activities of daily living (IADL) R53.81 799.3   3. Weakness R53.1 780.79             Patient History     Row Name 10/18/18 8410             History    Chief Complaint Difficulty with daily activities;Pain  -MR      Type of Pain Hand pain   left  -MR      Date Current Problem(s) Began --   Pt reports ~ 1 year  -MR      Brief Description of Current Complaint Pt is a 52 y/o F who was referred to OT d/t left hand pain. Pt reports pain has been present for ~ 1 year. She has been taking pain medication d/t other joint pain with minimal relief. Pt reports she had a x-ray with no evidence of fx and she reports she did not injury her hand. Pt reports pain begins in her thumb CMC joint then will radiate the the rest of her hand.   -MR      Previous treatment for THIS PROBLEM Pain Management  -MR      Patient/Caregiver Goals Relieve pain;Return to prior level of function;Improve strength;Know what to do to help the symptoms;Decrease swelling  -MR      Current Tobacco Use No  -MR      Smoking Status Never  -MR      Patient's Rating of General Health Good  -MR      Hand Dominance right-handed  -MR      Occupation/sports/leisure activities Hobbies: walk, garden, play with grandchildren  -MR      How has patient tried to help current problem? Splint and pain medication  -MR      What clinical tests have you had for this problem? X-ray  -MR      Are you or can you be pregnant No  -MR         Pain     Pain Location Hand   left  -MR      Pain at Present  4  -MR      Pain at Best 4  -MR      Pain at Worst 10  -MR      Pain Frequency Constant/continuous  -MR      Pain Description Aching;Burning;Sore  -MR      What Performance Factors Make the Current Problem(s) WORSE? Increased use  -MR      What Performance Factors Make the Current Problem(s) BETTER? Pt reports pain medication improves pain and heat  -MR      Is your sleep disturbed? Yes  -MR      Is medication used to assist with sleep? No  -MR      Difficulties at work? Pt does not work.   -MR      Difficulties with ADL's? Pt reports limitiations with ADLs d/t hand, knee and back pain. She reports she is able to complete with conditional independence but does require rest breaks d/t increased pain. Pt has noticed increased difficulty with FM task and gross .   -MR      Difficulties with recreational activities? Pt reports moderate to severe difficulty with home management tasks and also pain in impacting her care for her grandchildren whom she enjoys spending time with.   -MR         Fall Risk Assessment    Any falls in the past year: No  -MR      Does patient have a fear of falling No  -MR         Services    Prior Rehab/Home Health Experiences No  -MR         Daily Activities    Primary Language English  -MR      Pt Participated in POC and Goals Yes  -MR         Safety    Are you being hurt, hit, or frightened by anyone at home or in your life? No  -MR      Are you being neglected by a caregiver No  -MR        User Key  (r) = Recorded By, (t) = Taken By, (c) = Cosigned By    Initials Name Provider Type     Laura Garcia, OT Occupational Therapist                  OT Neuro     Row Name 10/18/18 0582             Subjective Comments    Subjective Comments Please see pt hx; Pt reports she does sleep in a splint but was not sure what kind, she stated she will bring to next OT session.   -MR         Precautions and Contraindications    Precautions/Limitations no known precautions/limitations  -MR          Subjective Pain    Able to rate subjective pain? yes  -MR      Pre-Treatment Pain Level 4  -MR      Post-Treatment Pain Level 4  -MR      Subjective Pain Comment Left hand  -MR         Home Living    Living Arrangements house  -MR      Home Equipment --   none  -MR      Living Environment Comment Pt lives with S/O  -MR         Vision- Basic    Current Vision Wears glasses only for reading  -MR         Cognitive Assessment/Intervention    Current Cognitive/Communication Assessment functional  -MR      Orientation Status (Cognition) oriented x 4  -MR      Follows Commands (Cognition) WNL;WFL  -MR      Cognition Comments Supervised activity, mild deficit; insight into current functional level  -MR         Sensation    Light Touch Partial deficits in the LUE  -MR      Sharp/Dull No apparent deficits  -MR      Additional Comments Pt reports intermitten numbness impacting light touch when she over uses hand  -MR         Posture/Observations    Alignment Options Rounded shoulders  -MR      Rounded Shoulders Bilateral:;Mild  -MR         Coordination    Coordination Tests Dexterity  -MR      Dexterity Left:;Impaired   d/t pain  -MR         Gross Motor Training    Gross Motor Skill, Impairments Detail Educated pt on edema management this date via retrograde massage d/t swelling noted along thenar pad. Educated pt on 6-pack tendon glides and AROM of thumb. Pt tolerated light PROM and joint mobilization of left thumb this date x 10 reps after MH applied for ~ 8 minutes.   -MR         General ROM    GENERAL ROM COMMENTS BUE AROM WFL; Left hand pt displayed full digit opposition, however increased pain noted.   -MR         MMT (Manual Muscle Testing)    General MMT Comments BUE grossly WFL 4+/5; please see  strength as deficits are noted  -MR         Transfers    Transfers, Sit-Stand [place] conditional independence  -MR      Transfers, Stand-Sit [place] conditional independence  -MR         Functional Mobility     Functional Mobility- Ind. Level conditional independence  -MR        User Key  (r) = Recorded By, (t) = Taken By, (c) = Cosigned By    Initials Name Provider Type    Laura Guillory, OT Occupational Therapist           Hand Therapy (last 24 hours)      Hand Eval     Row Name 10/18/18 5935             Subjective Comments    Subjective Comments Please see pt hx  -MR         Subjective Pain    Post-Treatment Pain Level 4  -MR      Subjective Pain Comment left hand  -MR         Hand  Strength     Strength Affected Side Bilateral  -MR          Strength Right    # Reps 3  -MR      Right Rung 2  -MR      Right  Test 1 48  -MR      Right  Test 2 48  -MR      Right  Test 3 51  -MR       Strength Average Right 49  -MR          Strength Left    # Reps 3  -MR      Left Rung 2  -MR      Left  Test 1 28  -MR      Left  Test 2 30  -MR      Left  Test 3 35  -MR       Strength Average Left 31  -MR         Pinch Strength    Affected Side Bilateral  -MR         Right Hand Strength - Pinch (lbs)    R Hand Pinch Strength 11  -MR      Lateral 14 lbs  -MR      Three Jaw [place] 12 lbs  -MR         Left Hand Strength - Pinch (lbs)    L Hand Pinch Strength 6  -MR      Lateral 11 lbs  -MR      Three Jaw Fernando 7 lbs  -MR         Therapy Education    Education Details Role of OT and POC, 6-pack tendon glides, AROM for thumb, edema management, heat to manage pain. Pt educated to d/c HEP if symptoms worsen or if she notices any adverse effects  -MR      Given HEP;Symptoms/condition management;Pain management;Edema management  -MR      Program New  -MR      How Provided Verbal;Demonstration;Written  -MR      Provided to Patient  -MR      Level of Understanding Teach back education performed;Verbalized;Demonstrated  -MR        User Key  (r) = Recorded By, (t) = Taken By, (c) = Cosigned By    Initials Name Provider Type    Laura Guillory MI, OT Occupational Therapist                Therapy  Education  Education Details: Role of OT and POC, 6-pack tendon glides, AROM for thumb, edema management, heat to manage pain. Pt educated to d/c HEP if symptoms worsen or if she notices any adverse effects  Given: HEP, Symptoms/condition management, Pain management, Edema management  Program: New  How Provided: Verbal, Demonstration, Written  Provided to: Patient  Level of Understanding: Teach back education performed, Verbalized, Demonstrated          OT Goals     Row Name 10/18/18 2104 10/18/18 1430       Long Term Goals    LTG Date to Achieve  -- --   by d/c  -MR    LTG 1  -- Pt will be independent and compliant with progressive HEP as noted on 3/3 sessions.   -MR    LTG 1 Progress  -- New  -MR    LTG 2  -- Pt will report/demonstrate independence with edema management technqiues as noted on 2/3 sessions.   -MR    LTG 2 Progress  -- New  -MR    LTG 3  -- Pt will improve left  strength to WFL per age and gender norms to improve functional independence with ADLs as noted on 2/3 sessions.   -MR    LTG 3 Progress  -- New  -MR    LTG 4  -- Pt will independently report/demonstrate joint protection techniques for left hand as noted on 2/3 sessions.   -MR    LTG 4 Progress  -- New  -MR    LTG 5  -- Pt will report improvement in pain in left hand to </= 2/10 pain with AROM as noted on 2/3 sessions.   -MR    LTG 5 Progress  -- New  -MR       Time Calculation    OT Goal Re-Cert Due Date 11/15/18  -MR  --      User Key  (r) = Recorded By, (t) = Taken By, (c) = Cosigned By    Initials Name Provider Type    MR Garcia Laura MI, OT Occupational Therapist                OT Assessment/Plan     Row Name 10/18/18 1430          OT Assessment    Functional Limitations Limitation in home management;Limitations in community activities;Limitations in functional capacity and performance;Performance in leisure activities;Performance in self-care ADL;Performance in work activities  -MR     Impairments  Coordination;Dexterity;Edema;Endurance;Muscle strength;Pain;Sensation;Range of motion;Motor function;Joint mobility;Joint integrity  -MR     Assessment Comments OT evaluation completed today. Pt is a 52 y/o F present with c/o of left hand pain with focus on thumb CMC joint. Pt presents with decreased activity tolerance and strength with increased swelling and pain noted. Pt could benefit from skilled OT services to address decreased strength, activity tolerance, AROM, edema management, pain management, EC/joint protection awareness, FMC and independence with ADLs.   -MR     OT Diagnosis impaired mobility and ADLs  -MR     OT Rehab Potential Good  -MR     Patient/caregiver participated in establishment of treatment plan and goals Yes  -MR     Patient would benefit from skilled therapy intervention Yes  -MR        OT Plan    OT Frequency 2x/week  -MR     Predicted Duration of Therapy Intervention (Therapy Eval) OT will re-assess in 30 days  -MR     Planned CPT's? OT EVAL MOD COMPLEXITY: 82588;OT RE-EVAL: 36399;OT THER ACT EA 15 MIN: [id number];OT THER PROC EA 15 MIN: 56328MP;OT NEUROMUSC RE EDUCATION EA 15 MIN: 34450;OT SELF CARE/MGMT/TRAIN 15 MIN: 25705;OT HOT/COLD PACK;OT PARAFFIN BATH: [id number];OT MANUAL THERAPY EA 15 MIN: 74704;OT CARE PLAN EA 15 MIN;OT ORTHOTIC MGMT/TRAIN EA 15 MIN: 67468;OT ORTHO/PROSTHET CHECKOUT EA 15 MIN: 10655;OT SENS INTEGRATIVE TECH EACH 15 MIN: 09963   superficial modalities, k-tape, ADLs  -MR     Planned Therapy Interventions (Optional Details) home exercise program;joint mobilization;manual therapy techniques;motor coordination training;orthotic fitting/training;patient/family education;ROM (Range of Motion);strengthening   superficial modalities, k-tape, ADLs/IADLs  -MR     OT Plan Comments Recommend skilled OT services to help pt reach maximum level of functional independence with ADLs/IADLs.   -MR       User Key  (r) = Recorded By, (t) = Taken By, (c) = Cosigned By    Initials Name  Provider Type     Jose Laura STARK OT Occupational Therapist                  Outcome Measure Options: Quick DASH  Quick DASH  Open a tight or new jar.: Severe Difficulty  Do heavy household chores (e.g., wash walls, wash floors): Severe Difficulty  Carry a shopping bag or briefcase: Severe Difficulty  Wash your back: Moderate Difficulty  Use a knife to cut food: No Difficulty  Recreational activities in which you take some force or impact through your arm, should or hand (e.g. golf, hammering, tennis, etc.): Moderate Difficulty  During the past week, to what extent has your arm, shoulder, or hand problem interfered with your normal social activites with family, friends, neighbors or groups?: Quite a bit  During the past week, were you limited in your work or other regular daily activities as a result of your arm, shoulder or hand problem?: Very limited  Arm, Shoulder, or hand pain: Severe  Tingling (pins and needles) in your arm, shoulder, or hand: None  During the past week, how much difficulty have you had sleeping because of the pain in your arm, shoulder or hand?: Severe Difficulty  Number of Questions Answered: 11  Quick DASH Score: 56.82         Time Calculation:   OT Start Time: 1430  OT Stop Time: 1515  OT Time Calculation (min): 45 min     Therapy Charges for Today     Code Description Service Date Service Provider Modifiers Qty    [id number]  OT SELFCARE CURRENT 10/18/2018 Laura Garcia OT GO CJ 1    [id number]  OT SELFCARE PROJECTED 10/18/2018 Laura Garcia OT GO, CI 1    [id number]  OT EVAL MOD COMPLEXITY 3 10/18/2018 Laura Garcia OT GO 1          OT G-codes  OT Professional Judgement Used?: Yes  OT Functional Scales Options: Quick DASH  Functional Limitation: Self care  Self Care Current Status (): At least 20 percent but less than 40 percent impaired, limited or restricted  Self Care Goal Status (): At least 1 percent but less than 20 percent impaired,  limited or restricted       Laura Garcia, OT  10/18/2018

## 2018-10-24 ENCOUNTER — HOSPITAL ENCOUNTER (OUTPATIENT)
Dept: OCCUPATIONAL THERAPY | Facility: HOSPITAL | Age: 53
Setting detail: THERAPIES SERIES
Discharge: HOME | End: 2018-10-24

## 2018-10-24 DIAGNOSIS — Z74.09 IMPAIRED MOBILITY AND ACTIVITIES OF DAILY LIVING: Primary | ICD-10-CM

## 2018-10-24 DIAGNOSIS — Z78.9 IMPAIRED INSTRUMENTAL ACTIVITIES OF DAILY LIVING (IADL): ICD-10-CM

## 2018-10-24 DIAGNOSIS — Z78.9 IMPAIRED MOBILITY AND ACTIVITIES OF DAILY LIVING: Primary | ICD-10-CM

## 2018-10-24 DIAGNOSIS — R53.1 WEAKNESS: ICD-10-CM

## 2018-10-24 PROCEDURE — 97110 THERAPEUTIC EXERCISES: CPT

## 2018-10-24 NOTE — THERAPY TREATMENT NOTE
Outpatient Occupational Therapy Rehab Program Treatment  [facility]     Patient Name: Nerissa Lopez  : 1965  MRN: [id number]  Today's Date: 10/24/2018        Visit Date: 10/24/2018    Visit Number:     Recert Date: 11/15/2018   % Improvement: N/A          MD Visit Date: TBD    Total Insurance visit approved: 12 v 32 units       Patient Active Problem List   Diagnosis   • Hyperthyroidism   • Osteoporosis   • Vitamin D deficiency   • Menopausal syndrome (hot flashes)   • Anal fistula   • Anemia   • Astigmatism   • Death of family member   • Gastroesophageal reflux disease   • Generalized anxiety disorder   • Goiter   • Graves' disease   • Hematuria   • Lesion of eyelid   • Lumbosacral spondylolysis   • Menopausal syndrome   • Myopia   • Lumbar back pain   • Panic attack   • Presbyopia   • Takotsubo cardiomyopathy   • Thoracic spondylosis   • Spondylolisthesis of lumbar region        Past Medical History:   Diagnosis Date   • Anal fistula    • Anemia    • Astigmatism    • Broken heart syndrome 2016    when mother passed away   • Chronic pain disorder    • Death of family member     Mother passes away.   • Generalized anxiety disorder    • GERD (gastroesophageal reflux disease)    • Goiter    • Graves disease    • Heartburn    • Hematuria    • Hyperthyroidism    • Lesion of eyelid     LLL   • Low back pain    • Lumbosacral spondylosis    • Menopausal syndrome    • Myopia    • Osteoporosis    • Pain in back     Lumbar region   • Pain in joint involving left lower leg    • Pain in wrist    • Panic attack    • Presbyopia    • Right upper quadrant pain    • Takotsubo cardiomyopathy    • Thoracic spondylosis         Past Surgical History:   Procedure Laterality Date   • CARDIAC CATHETERIZATION  2016    Normal coronaries with no obstructive disease. Nonischemic stress induced cardiomyopathy known as Takotsubo syndrome.   • COLONOSCOPY  2016    Removal of anal fistula   • CYST REMOVAL Right      right breast   • EYELID CARCINOMA EXCISION  06/15/2015   • HYSTERECTOMY  04/14/2014    Total abdominal hysterectomy, bilateral salpingo-oophorectomy. Menorrhagia and leiomyomatous uterus.   • SKIN BIOPSY  06/29/2014   • TUBAL ABDOMINAL LIGATION     • WOUND CLOSURE  07/20/2014    Layer closure of wound.   • WOUND CLOSURE  01/16/2014    Layer closure of wound         Visit Dx:    ICD-10-CM ICD-9-CM   1. Impaired mobility and activities of daily living Z74.09 799.89   2. Impaired instrumental activities of daily living (IADL) R53.81 799.3   3. Weakness R53.1 780.79               OT Neuro     Row Name 10/24/18 1345             Subjective Comments    Subjective Comments Pt here with splint this date however not wearing splint upon entry. Pt states she only sleeps in splint at night per MD instructions. Pt states she continues to have dificulties with pain in L thumb and completing daily activity living skills.  -BL         Precautions and Contraindications    Precautions/Limitations no known precautions/limitations  -BL         Subjective Pain    Able to rate subjective pain? yes  -BL      Pre-Treatment Pain Level 5  -BL      Post-Treatment Pain Level 4  -BL      Subjective Pain Comment L thumb CMC  -BL         Cognitive Assessment/Intervention    Current Cognitive/Communication Assessment functional  -BL      Orientation Status (Cognition) oriented x 4  -BL      Follows Commands (Cognition) WFL;WNL  -BL         Sensation    Light Touch Partial deficits in the LUE  -BL         General ROM    Left Hand Thumb Comment  -BL         Left Thumb    LT THUMB COMMENTS Pt tolerated MH prior to PROM and joint mobilization this date at L hand x 10 minutes. Pt was able to tolerated PROM of thumb CMC joint in all planes x 15 reps each with rest breaks as needed. Pt was able to demonstrate good tolerance for elongation stretches into full extension passively x 10 reps with 3 rest breaks however no increased pain this date. Pt  tolerated light edema massage to thenar eminence for decreasing overall swelling with good results this date. Joint mobilization in both clockwise and counterclockwise completed this date of thumb cmc with noted moderate grinding; mild reports of pain. Joint mobilization and wrist PROM completed this date x 10 reps each with mild c/o pain along with mild grinding noted at radiocarpal joint with rest breaks as needed. Pt educated on contrast baths this date for HEP to reduce edema and increase AROM with less pain. Pt also educated to decrease HEP of six pack and thumb exercises to 1 x a day working towards 2 x a day if able.  -BL        User Key  (r) = Recorded By, (t) = Taken By, (c) = Cosigned By    Initials Name Provider Type    Micki Quach COTA/L Occupational Therapy Assistant           Hand Therapy (last 24 hours)      Hand Eval     Row Name 10/24/18 4472             Subjective Pain    Able to rate subjective pain? yes  -BL      Pre-Treatment Pain Level 5  -BL      Post-Treatment Pain Level 4  -BL      Subjective Pain Comment L hand Thumb CMC  -BL         Therapy Education    Education Details Educated on pain management and edema management at home with added contrast baths for decreasing swelling of L thumb. Pt was able to demo full understanding this date  -BL      Given HEP;Symptoms/condition management;Pain management;Edema management  -BL      Program Progressed  -BL      How Provided Verbal;Demonstration;Written  -BL      Provided to Patient  -BL      Level of Understanding Teach back education performed;Verbalized;Demonstrated  -BL        User Key  (r) = Recorded By, (t) = Taken By, (c) = Cosigned By    Initials Name Provider Type    Micki Quach COTA/L Occupational Therapy Assistant                Therapy Education  Education Details: Educated on pain management and edema management at home with added contrast baths for decreasing swelling of L thumb. Pt was able to demo full understanding  this date  Given: HEP, Symptoms/condition management, Pain management, Edema management  Program: Progressed  How Provided: Verbal, Demonstration, Written  Provided to: Patient  Level of Understanding: Teach back education performed, Verbalized, Demonstrated          OT Assessment/Plan     Row Name 10/24/18 1430          OT Assessment    Assessment Comments OT tx tolerated well this date with emphaiss on therapeutic exercises and joint mobilization to decrease overall join pain of L thumb CMC. Pt was able to demonstrate full understanding of HEP this date and is compliant however HEP modified at this time due to increasing pain. Pt continues to demonstrate deficitis in functional  strength, FMC, AROM, and pain management and will continue to benefit from skilled OT at this time  -BL        OT Plan    OT Frequency 2x/week  -BL       User Key  (r) = Recorded By, (t) = Taken By, (c) = Cosigned By    Initials Name Provider Type     Micki Tenorio, MICHAEL/L Occupational Therapy Assistant                 OT Goals     Row Name 10/24/18 1345          Long Term Goals    LTG Date to Achieve --   by d/c  -BL     LTG 1 Pt will be independent and compliant with progressive HEP as noted on 3/3 sessions.   -BL     LTG 1 Progress Partially Met;Ongoing  -BL     LTG 2 Pt will report/demonstrate independence with edema management technqiues as noted on 2/3 sessions.   -BL     LTG 2 Progress Not Met  -BL     LTG 3 Pt will improve left  strength to WFL per age and gender norms to improve functional independence with ADLs as noted on 2/3 sessions.   -BL     LTG 3 Progress Not Met  -BL     LTG 4 Pt will independently report/demonstrate joint protection techniques for left hand as noted on 2/3 sessions.   -BL     LTG 4 Progress Not Met  -BL     LTG 5 Pt will report improvement in pain in left hand to </= 2/10 pain with AROM as noted on 2/3 sessions.   -BL     LTG 5 Progress Not Met  -BL        Time Calculation    OT Goal Re-Cert  Due Date 11/15/18  -BL       User Key  (r) = Recorded By, (t) = Taken By, (c) = Cosigned By    Initials Name Provider Type    BL Micki Tenorio COTA/L Occupational Therapy Assistant                Modalities     Row Name 10/24/18 1345             Precautions    Existing Precautions/Restrictions no known precautions/restrictions  -BL         Subjective Comments    Subjective Comments see hand  -BL         Functional Mobility    Functional Mobility- Ind. Level independent  -BL         Moist Heat    MH Applied Yes  -BL      Location L hand  -BL      Rx Minutes 10 mins  -BL      MH Prior to Rx Yes  -BL      MH S/P Rx No  -BL         Parrafin    Paraffin 31315 Location L hand  -BL      Rx Minutes x 5 minutes  -BL        User Key  (r) = Recorded By, (t) = Taken By, (c) = Cosigned By    Initials Name Provider Type    BL Micki Tenorio RODRIGUEZ/L Occupational Therapy Assistant                          Time Calculation:   OT Start Time: 1345  OT Stop Time: 1430  OT Time Calculation (min): 45 min  Total Timed Code Minutes- OT: 45 minute(s)    Therapy Suggested Charges     Code   Minutes Charges    None              Therapy Charges for Today     Code Description Service Date Service Provider Modifiers Qty    01687289520 HC OT THER PROC EA 15 MIN 10/24/2018 Micki Tenorio COTA/LORENZO GO 3                    SUDEEP Pradhan  10/24/2018

## 2018-10-28 PROCEDURE — 87480 CANDIDA DNA DIR PROBE: CPT | Performed by: NURSE PRACTITIONER

## 2018-10-28 PROCEDURE — 87660 TRICHOMONAS VAGIN DIR PROBE: CPT | Performed by: NURSE PRACTITIONER

## 2018-10-28 PROCEDURE — 87510 GARDNER VAG DNA DIR PROBE: CPT | Performed by: NURSE PRACTITIONER

## 2018-10-28 PROCEDURE — 87109 MYCOPLASMA: CPT | Performed by: NURSE PRACTITIONER

## 2018-11-01 ENCOUNTER — HOSPITAL ENCOUNTER (OUTPATIENT)
Dept: OCCUPATIONAL THERAPY | Facility: HOSPITAL | Age: 53
Setting detail: THERAPIES SERIES
Discharge: HOME | End: 2018-11-01

## 2018-11-01 DIAGNOSIS — R53.1 WEAKNESS: ICD-10-CM

## 2018-11-01 DIAGNOSIS — Z74.09 IMPAIRED MOBILITY AND ACTIVITIES OF DAILY LIVING: Primary | ICD-10-CM

## 2018-11-01 DIAGNOSIS — Z78.9 IMPAIRED MOBILITY AND ACTIVITIES OF DAILY LIVING: Primary | ICD-10-CM

## 2018-11-01 DIAGNOSIS — Z78.9 IMPAIRED INSTRUMENTAL ACTIVITIES OF DAILY LIVING (IADL): ICD-10-CM

## 2018-11-01 PROCEDURE — 97110 THERAPEUTIC EXERCISES: CPT

## 2018-11-01 NOTE — THERAPY TREATMENT NOTE
Outpatient Occupational Therapy Rehab Program Treatment  [facility]     Patient Name: Nerissa Lopez  : 1965  MRN: [id number]  Today's Date: 2018        Visit Date: 2018   Visit Number: 3/3  % Improvement: N/A  Recert Date: 11/15/2018  MD visit: TBD    Insurance Visits Approved: 12 visits 32 units      Patient Active Problem List   Diagnosis   • Hyperthyroidism   • Osteoporosis   • Vitamin D deficiency   • Menopausal syndrome (hot flashes)   • Anal fistula   • Anemia   • Astigmatism   • Death of family member   • Gastroesophageal reflux disease   • Generalized anxiety disorder   • Goiter   • Graves' disease   • Hematuria   • Lesion of eyelid   • Lumbosacral spondylolysis   • Menopausal syndrome   • Myopia   • Lumbar back pain   • Panic attack   • Presbyopia   • Takotsubo cardiomyopathy   • Thoracic spondylosis   • Spondylolisthesis of lumbar region        Past Medical History:   Diagnosis Date   • Anal fistula    • Anemia    • Astigmatism    • Broken heart syndrome 2016    when mother passed away   • Chronic pain disorder    • Death of family member     Mother passes away.   • Generalized anxiety disorder    • GERD (gastroesophageal reflux disease)    • Goiter    • Graves disease    • Heartburn    • Hematuria    • Hyperthyroidism    • Lesion of eyelid     LLL   • Low back pain    • Lumbosacral spondylosis    • Menopausal syndrome    • Myopia    • Osteoporosis    • Pain in back     Lumbar region   • Pain in joint involving left lower leg    • Pain in wrist    • Panic attack    • Presbyopia    • Right upper quadrant pain    • Takotsubo cardiomyopathy    • Thoracic spondylosis         Past Surgical History:   Procedure Laterality Date   • CARDIAC CATHETERIZATION  2016    Normal coronaries with no obstructive disease. Nonischemic stress induced cardiomyopathy known as Takotsubo syndrome.   • COLONOSCOPY  2016    Removal of anal fistula   • CYST REMOVAL Right     right breast   •  EYELID CARCINOMA EXCISION  06/15/2015   • HYSTERECTOMY  04/14/2014    Total abdominal hysterectomy, bilateral salpingo-oophorectomy. Menorrhagia and leiomyomatous uterus.   • SKIN BIOPSY  06/29/2014   • TUBAL ABDOMINAL LIGATION     • WOUND CLOSURE  07/20/2014    Layer closure of wound.   • WOUND CLOSURE  01/16/2014    Layer closure of wound         Visit Dx:    ICD-10-CM ICD-9-CM   1. Impaired mobility and activities of daily living Z74.09 799.89   2. Impaired instrumental activities of daily living (IADL) R53.81 799.3   3. Weakness R53.1 780.79               OT Neuro     Row Name 11/01/18 1430             Subjective Comments    Subjective Comments Pt present alone this date reporting moderate pain in left thumb. Pt reports compliance with AROM exercises, however reports she does not like the contrast baths and has only completed once since last OT session. Pt reports plans to set-up an appointment with a Rheumatologist.   -MR         Precautions and Contraindications    Precautions/Limitations no known precautions/limitations  -MR         Subjective Pain    Able to rate subjective pain? yes  -MR      Pre-Treatment Pain Level 5  -MR      Post-Treatment Pain Level 4  -MR      Subjective Pain Comment L thumb CMC  -MR         Cognitive Assessment/Intervention    Current Cognitive/Communication Assessment functional  -MR      Orientation Status (Cognition) oriented x 4  -MR      Follows Commands (Cognition) WFL;WNL  -MR         Sensation    Light Touch Partial deficits in the LUE  -MR         Left Thumb    LT THUMB COMMENTS Pt tolerated MH prior to PROM and joint mobilization this date at L hand x 10 minutes. Pt was able to tolerate PROM of thumb CMC joint in all planes x 10 reps each with no rest breaks needed. Joint mobilization in both clockwise and counterclockwise completed this date of thumb cmc with noted moderate grinding; mild reports of pain. Joint mobilization and wrist PROM completed this date x 10 reps each  with mild c/o pain along with mild grinding noted. Pt tolerated massage of left thenar eminence d/t increased swelling and knot noted, improved with massage. Pt educated on joint protection techniques to assist with increased pain, pt was able to state understanding of joint protection. Pt cmpleted 1 x 10 reps of 6 pack tendon glides this date with most difficulty with claw and table top this date.   -MR         Functional Mobility    Functional Mobility- Ind. Level independent  -MR        User Key  (r) = Recorded By, (t) = Taken By, (c) = Cosigned By    Initials Name Provider Type    Laura Guillory, OT Occupational Therapist                  Therapy Education  Education Details: Educated pt on joint protection this date, reviewed current HEP  Given: HEP, Symptoms/condition management, Pain management, Edema management  Program: Progressed  How Provided: Verbal, Demonstration, Written  Provided to: Patient  Level of Understanding: Teach back education performed, Verbalized, Demonstrated          OT Assessment/Plan     Row Name 11/01/18 1430          OT Assessment    Assessment Comments OT treatment session tolerated fair to well this date with moderate pain reported during PROM/AROM exercises of left hand. Pt could cont to benefit from skilled OT services to address decreased strength, activity tolerance, pain management, edema management and independence with ADLs.   -MR        OT Plan    OT Frequency 2x/week  -MR       User Key  (r) = Recorded By, (t) = Taken By, (c) = Cosigned By    Initials Name Provider Type    Laura Guillory, OT Occupational Therapist                 OT Goals     Row Name 11/01/18 1430          Long Term Goals    LTG Date to Achieve --   by d/c  -MR     LTG 1 Pt will be independent and compliant with progressive HEP as noted on 3/3 sessions.   -MR     LTG 1 Progress Partially Met;Ongoing  -MR     LTG 2 Pt will report/demonstrate independence with edema management technqiues as  noted on 2/3 sessions.   -MR     LTG 2 Progress Partially Met  -MR     LTG 3 Pt will improve left  strength to WFL per age and gender norms to improve functional independence with ADLs as noted on 2/3 sessions.   -MR     LTG 3 Progress Not Met  -MR     LTG 4 Pt will independently report/demonstrate joint protection techniques for left hand as noted on 2/3 sessions.   -MR     LTG 4 Progress Progressing  -MR     LTG 4 Progress Comments Educated pt on joint protection this date  -MR     LTG 5 Pt will report improvement in pain in left hand to </= 2/10 pain with AROM as noted on 2/3 sessions.   -MR     LTG 5 Progress Not Met  -MR        Time Calculation    OT Goal Re-Cert Due Date 11/15/18  -MR       User Key  (r) = Recorded By, (t) = Taken By, (c) = Cosigned By    Initials Name Provider Type    Laura Guillory, OT Occupational Therapist                Modalities     Row Name 11/01/18 1430             Precautions    Existing Precautions/Restrictions no known precautions/restrictions  -MR         Subjective Comments    Subjective Comments See OT Neuro  -MR         Subjective Pain    Able to rate subjective pain? yes  -MR      Pre-Treatment Pain Level 5  -MR      Post-Treatment Pain Level 4  -MR      Subjective Pain Comment L thumb CMC  -MR         Moist Heat    MH Applied Yes  -MR      Location L hand  -MR      Rx Minutes 10 mins  -MR      MH Prior to Rx Yes  -MR      MH S/P Rx No  -MR        User Key  (r) = Recorded By, (t) = Taken By, (c) = Cosigned By    Initials Name Provider Type    Laura Guillory, OT Occupational Therapist                          Time Calculation:   OT Start Time: 1430  OT Stop Time: 1509  OT Time Calculation (min): 39 min  Total Timed Code Minutes- OT: 39 minute(s)    Therapy Suggested Charges     Code   Minutes Charges    None              Therapy Charges for Today     Code Description Service Date Service Provider Modifiers Qty    [id number]  OT THER PROC EA 15 MIN 11/1/2018  Jose, Laura STARK, OT GO 3                    Laura Garcia, CRISTOPHER  11/1/2018

## 2018-11-07 ENCOUNTER — HOSPITAL ENCOUNTER (OUTPATIENT)
Dept: OCCUPATIONAL THERAPY | Facility: HOSPITAL | Age: 53
Setting detail: THERAPIES SERIES
Discharge: HOME | End: 2018-11-07

## 2018-11-07 DIAGNOSIS — Z78.9 IMPAIRED MOBILITY AND ACTIVITIES OF DAILY LIVING: Primary | ICD-10-CM

## 2018-11-07 DIAGNOSIS — R53.1 WEAKNESS: ICD-10-CM

## 2018-11-07 DIAGNOSIS — Z74.09 IMPAIRED MOBILITY AND ACTIVITIES OF DAILY LIVING: Primary | ICD-10-CM

## 2018-11-07 DIAGNOSIS — Z78.9 IMPAIRED INSTRUMENTAL ACTIVITIES OF DAILY LIVING (IADL): ICD-10-CM

## 2018-11-07 PROCEDURE — 97110 THERAPEUTIC EXERCISES: CPT

## 2018-11-07 PROCEDURE — 97530 THERAPEUTIC ACTIVITIES: CPT

## 2018-11-07 NOTE — THERAPY TREATMENT NOTE
Outpatient Occupational Therapy Rehab Program Treatment  [facility]     Patient Name: Nerissa Lopez  : 1965  MRN: [id number]  Today's Date: 2018        Visit Date: 2018   Visit Number:    Recert Date: 11/15/2018   % Improvement:   N/A       MD Visit Date: TBD; this month with PCP per pt report    Total Insurance visit approved: 12- 3 visit remaining after today         Patient Active Problem List   Diagnosis   • Hyperthyroidism   • Osteoporosis   • Vitamin D deficiency   • Menopausal syndrome (hot flashes)   • Anal fistula   • Anemia   • Astigmatism   • Death of family member   • Gastroesophageal reflux disease   • Generalized anxiety disorder   • Goiter   • Graves' disease   • Hematuria   • Lesion of eyelid   • Lumbosacral spondylolysis   • Menopausal syndrome   • Myopia   • Lumbar back pain   • Panic attack   • Presbyopia   • Takotsubo cardiomyopathy   • Thoracic spondylosis   • Spondylolisthesis of lumbar region        Past Medical History:   Diagnosis Date   • Anal fistula    • Anemia    • Astigmatism    • Broken heart syndrome 2016    when mother passed away   • Chronic pain disorder    • Death of family member     Mother passes away.   • Generalized anxiety disorder    • GERD (gastroesophageal reflux disease)    • Goiter    • Graves disease    • Heartburn    • Hematuria    • Hyperthyroidism    • Lesion of eyelid     LLL   • Low back pain    • Lumbosacral spondylosis    • Menopausal syndrome    • Myopia    • Osteoporosis    • Pain in back     Lumbar region   • Pain in joint involving left lower leg    • Pain in wrist    • Panic attack    • Presbyopia    • Right upper quadrant pain    • Takotsubo cardiomyopathy    • Thoracic spondylosis         Past Surgical History:   Procedure Laterality Date   • CARDIAC CATHETERIZATION  2016    Normal coronaries with no obstructive disease. Nonischemic stress induced cardiomyopathy known as Takotsubo syndrome.   • COLONOSCOPY   01/30/2016    Removal of anal fistula   • CYST REMOVAL Right     right breast   • EYELID CARCINOMA EXCISION  06/15/2015   • HYSTERECTOMY  04/14/2014    Total abdominal hysterectomy, bilateral salpingo-oophorectomy. Menorrhagia and leiomyomatous uterus.   • SKIN BIOPSY  06/29/2014   • TUBAL ABDOMINAL LIGATION     • WOUND CLOSURE  07/20/2014    Layer closure of wound.   • WOUND CLOSURE  01/16/2014    Layer closure of wound         Visit Dx:    ICD-10-CM ICD-9-CM   1. Impaired mobility and activities of daily living Z74.09 799.89   2. Impaired instrumental activities of daily living (IADL) R53.81 799.3   3. Weakness R53.1 780.79               OT Neuro     Row Name 11/07/18 1015             Subjective Comments    Subjective Comments Pt here stating she has not recieved a referral for a Rheumatologist yet and still waiting to hear back from her PCP about that referral. Pt will see her PCP this month and will be asking if no word this week. Pt states she is compliant with HEP at this time.  -BL         Precautions and Contraindications    Precautions/Limitations no known precautions/limitations  -BL         Subjective Pain    Able to rate subjective pain? yes  -BL      Pre-Treatment Pain Level 4  -BL      Post-Treatment Pain Level 4  -BL      Subjective Pain Comment L thumb CMC  -BL         Cognitive Assessment/Intervention    Current Cognitive/Communication Assessment functional  -BL      Orientation Status (Cognition) oriented x 4  -BL      Follows Commands (Cognition) WFL;WNL  -BL         Sensation    Light Touch Partial deficits in the LUE  -BL         Coordination    Coordination Tests Dexterity  -BL      Dexterity Left:;Impaired   Improving  -BL         General ROM    Left Hand Thumb Comment  -BL         Left Thumb    LT THUMB COMMENTS Joint mobilization at L thumb CMC tolerated fair this date with mild to moderate joint grinding noted with rest breaks required due to pain 4-5/10 reported. Pt tolerated 10 reps in  each plane of joint mobilization at L thumb CMC. Soft tissue mobilization at thumb CMC completed this date with emphasis on edema management to decrease swelling with noted decrease overall since last tx session with no c/o pain. Pt tolerated PROM and A/AROM of L thumb through all planes with most c/o pain into full CMC extension this date x 10 reps each plane. Fluidotherapy was completed along with superficial modality of paraffin to decrease pain and increase joint mobility this date. Pt completed 10 reps of each six pack exericse while in fluidotherapy with no c/o pain and good tolerance of modality.   -BL         Functional Mobility    Functional Mobility- Ind. Level independent  -BL         ADL Assessment/Intervention    ADL's Assessed? Upper Body Dressing  -         Upper Body Dressing Assessment/Training    Comment (Upper Body Dressing) Pt still reports mild difficulty with buttons however is managing at this time by using splint as needed.   -        User Key  (r) = Recorded By, (t) = Taken By, (c) = Cosigned By    Initials Name Provider Type     Micki Tenorio, RODRIGUEZ/L Occupational Therapy Assistant                             OT Assessment/Plan     Row Name 11/07/18 1015          OT Assessment    Assessment Comments PT tolerated OT tx well this date with rest breaks required during PROM/A/AROM into full thumb extension due to pain however was able to tolerate light yellow putty exercises in BUE hands for light progressive strengthening exercises with minimal c/o pain this date. Pt was given updated HEP with putty for home use and was able to demonstrate understanding. Pt and RODRIGUEZ/L discussed need for semi rigid thumb CMC splint in order to provide correct positioning and decrease overall joint pain of L thumb. Will fabricate at next OT session. 2 goals met this date and will continue to benefit from skilled OT services to address deficits in FMC, in hand manipulation, ADL independence, and overall  strength.  -        OT Plan    OT Frequency 2x/week  -       User Key  (r) = Recorded By, (t) = Taken By, (c) = Cosigned By    Initials Name Provider Type     Micki Tenorio COTA/L Occupational Therapy Assistant                 OT Goals     Row Name 11/07/18 1015          Long Term Goals    LTG Date to Achieve --   by d/c  -     LTG 1 Pt will be independent and compliant with progressive HEP as noted on 3/3 sessions.   -BL     LTG 1 Progress Met;Ongoing  -BL     LTG 2 Pt will report/demonstrate independence with edema management technqiues as noted on 2/3 sessions.   -BL     LTG 2 Progress Met;Ongoing  -     LTG 2 Progress Comments met 2/3 this date  -     LTG 3 Pt will improve left  strength to WFL per age and gender norms to improve functional independence with ADLs as noted on 2/3 sessions.   -     LTG 3 Progress Not Met  -     LTG 4 Pt will independently report/demonstrate joint protection techniques for left hand as noted on 2/3 sessions.   -     LTG 4 Progress Progressing  -     LTG 5 Pt will report improvement in pain in left hand to </= 2/10 pain with AROM as noted on 2/3 sessions.   -     LTG 5 Progress Not Met  -        Time Calculation    OT Goal Re-Cert Due Date 11/15/18  -       User Key  (r) = Recorded By, (t) = Taken By, (c) = Cosigned By    Initials Name Provider Type     Micki Tenorio COTA/L Occupational Therapy Assistant                Modalities     Row Name 11/07/18 1015             Subjective Comments    Subjective Comments See OT neuro  -BL         Subjective Pain    Able to rate subjective pain? yes  -BL      Pre-Treatment Pain Level 4  -BL      Post-Treatment Pain Level 4  -BL      Subjective Pain Comment L thumb CMC  -BL         Other Treatment Provided    Taping / Bracing Heavily discussed need for L thumb CMC splint to be fabricated at next OT session. Pt agreeable and verbalizes understanding this date. MD order signed and ready for splint fabrication.   -BL         Parrafin    Paraffin [id number] Location L hand  -BL      Rx Minutes 6 minutes  -BL        User Key  (r) = Recorded By, (t) = Taken By, (c) = Cosigned By    Initials Name Provider Type     Micki Tenorio COTA/L Occupational Therapy Assistant                OT Exercises     Row Name 11/07/18 1015             Precautions    Existing Precautions/Restrictions no known precautions/restrictions  -BL         Exercise 1    Exercise Name 1 In hand manipulation task   -BL      Cueing 1 Verbal;Demo  -BL      Equipment 1 --   marbles  -BL      Time (Minutes) 1 5  -BL      Intensity 1 Moderate  -BL         Exercise 2    Exercise Name 2 Theraputty exercises in B Hands this date with emphasis on in hand manipulation and rolling for full palm extension of digits and thumb in L hand  -BL      Cueing 2 Verbal;Demo  -BL      Equipment 2 Theraputty  -BL      Resistance 2 Yellow  -BL      Time (Minutes) 2 8  -BL      Intensity 2 Moderate  -BL         Exercise 3    Exercise Name 3 six pack exercises  -BL      Cueing 3 Verbal;Demo  -BL      Sets 3 2  -BL      Reps 3 10  -BL      Intensity 3 Mild  -BL         Exercise 4    Exercise Name 4 Soft tissue mobilization for edema mangament  -BL      Cueing 4 Tactile  -BL      Time (Minutes) 14 5  -BL      Intensity 4 Moderate  -BL        User Key  (r) = Recorded By, (t) = Taken By, (c) = Cosigned By    Initials Name Provider Type     Micki Tenorio RODRIGUEZ/L Occupational Therapy Assistant                      Time Calculation:   OT Start Time: 1015  OT Stop Time: 1110  OT Time Calculation (min): 55 min  Total Timed Code Minutes- OT: 55 minute(s)    Therapy Suggested Charges     Code   Minutes Charges    None              Therapy Charges for Today     Code Description Service Date Service Provider Modifiers Qty    44084713229 HC OT THER PROC EA 15 MIN 11/7/2018 Micki Tenorio RODRIGUEZ/L GO 2    18784577998 HC OT THERAPEUTIC ACT EA 15 MIN 11/7/2018 Micki Tenorio COTA/LORENZO GO 2                     MICHAEL Pradhan/LORENZO  11/7/2018

## 2018-11-08 ENCOUNTER — APPOINTMENT (OUTPATIENT)
Dept: OCCUPATIONAL THERAPY | Facility: HOSPITAL | Age: 53
End: 2018-11-08

## 2018-11-15 ENCOUNTER — HOSPITAL ENCOUNTER (OUTPATIENT)
Dept: OCCUPATIONAL THERAPY | Facility: HOSPITAL | Age: 53
Setting detail: THERAPIES SERIES
Discharge: HOME | End: 2018-11-15

## 2018-11-15 DIAGNOSIS — Z74.09 IMPAIRED MOBILITY AND ACTIVITIES OF DAILY LIVING: Primary | ICD-10-CM

## 2018-11-15 DIAGNOSIS — Z78.9 IMPAIRED INSTRUMENTAL ACTIVITIES OF DAILY LIVING (IADL): ICD-10-CM

## 2018-11-15 DIAGNOSIS — Z78.9 IMPAIRED MOBILITY AND ACTIVITIES OF DAILY LIVING: Primary | ICD-10-CM

## 2018-11-15 DIAGNOSIS — R53.1 WEAKNESS: ICD-10-CM

## 2018-11-15 PROCEDURE — 97530 THERAPEUTIC ACTIVITIES: CPT

## 2018-11-15 PROCEDURE — 97760 ORTHOTIC MGMT&TRAING 1ST ENC: CPT

## 2018-11-15 PROCEDURE — G8987 SELF CARE CURRENT STATUS: HCPCS

## 2018-11-15 PROCEDURE — G8988 SELF CARE GOAL STATUS: HCPCS

## 2018-11-15 NOTE — THERAPY TREATMENT NOTE
Outpatient Occupational Therapy Rehab Program Treatment  [facility]     Patient Name: Nerissa Lopez  : 1965  MRN: [id number]  Today's Date: 11/15/2018        Visit Date: 11/15/2018   Visit Number:    Recert Date: 11/15/2018   % Improvement:  40%         MD Visit Date:     Total Insurance visit approved: 3 visits left         Patient Active Problem List   Diagnosis   • Hyperthyroidism   • Osteoporosis   • Vitamin D deficiency   • Menopausal syndrome (hot flashes)   • Anal fistula   • Anemia   • Astigmatism   • Death of family member   • Gastroesophageal reflux disease   • Generalized anxiety disorder   • Goiter   • Graves' disease   • Hematuria   • Lesion of eyelid   • Lumbosacral spondylolysis   • Menopausal syndrome   • Myopia   • Lumbar back pain   • Panic attack   • Presbyopia   • Takotsubo cardiomyopathy   • Thoracic spondylosis   • Spondylolisthesis of lumbar region        Past Medical History:   Diagnosis Date   • Anal fistula    • Anemia    • Astigmatism    • Broken heart syndrome 2016    when mother passed away   • Chronic pain disorder    • Death of family member     Mother passes away.   • Generalized anxiety disorder    • GERD (gastroesophageal reflux disease)    • Goiter    • Graves disease    • Heartburn    • Hematuria    • Hyperthyroidism    • Lesion of eyelid     LLL   • Low back pain    • Lumbosacral spondylosis    • Menopausal syndrome    • Myopia    • Osteoporosis    • Pain in back     Lumbar region   • Pain in joint involving left lower leg    • Pain in wrist    • Panic attack    • Presbyopia    • Right upper quadrant pain    • Takotsubo cardiomyopathy    • Thoracic spondylosis         Past Surgical History:   Procedure Laterality Date   • CARDIAC CATHETERIZATION  2016    Normal coronaries with no obstructive disease. Nonischemic stress induced cardiomyopathy known as Takotsubo syndrome.   • COLONOSCOPY  2016    Removal of anal fistula   • CYST REMOVAL Right      right breast   • EYELID CARCINOMA EXCISION  06/15/2015   • HYSTERECTOMY  04/14/2014    Total abdominal hysterectomy, bilateral salpingo-oophorectomy. Menorrhagia and leiomyomatous uterus.   • SKIN BIOPSY  06/29/2014   • TUBAL ABDOMINAL LIGATION     • WOUND CLOSURE  07/20/2014    Layer closure of wound.   • WOUND CLOSURE  01/16/2014    Layer closure of wound         Visit Dx:    ICD-10-CM ICD-9-CM   1. Impaired mobility and activities of daily living Z74.09 799.89   2. Impaired instrumental activities of daily living (IADL) R53.81 799.3   3. Weakness R53.1 780.79         OT Neuro     Row Name 11/15/18 1445 11/15/18 1345          Subjective Comments    Subjective Comments  Pt here after OT recert stating her L thumb is feeling overall 40% improved since beginning with OT. Pt states the swelling is decreased overall in L thumb and she is more functional at home with L hand. Pt did not state new concerns and does not have any upcoming MD appointments. Still no word from on referral from Rheumatologist at this time; will continue to follow on that referral.  -BL  --  -BL        Precautions and Contraindications    Precautions/Limitations  no known precautions/limitations  -BL  --  -BL        Subjective Pain    Able to rate subjective pain?  --  --  -BL     Pre-Treatment Pain Level  --  --  -BL     Post-Treatment Pain Level  --  --  -BL     Subjective Pain Comment  --  --  -BL        Cognitive Assessment/Intervention    Current Cognitive/Communication Assessment  functional  -BL  --  -BL     Orientation Status (Cognition)  oriented x 4  -BL  --  -BL     Follows Commands (Cognition)  WNL;WFL  -BL  --  -BL        Sensation    Light Touch  No apparent deficits  -BL  --  -BL       User Key  (r) = Recorded By, (t) = Taken By, (c) = Cosigned By    Initials Name Provider Type    BL Micki Tenorio, MICHAEL/L Occupational Therapy Assistant           Hand Therapy (last 24 hours)      Hand Eval     Row Name 11/15/18 7131              Subjective Comments    Subjective Comments  Please see Neuro subjective.  -BL         Subjective Pain    Able to rate subjective pain?  yes  -BL      Pre-Treatment Pain Level  4  -BL      Post-Treatment Pain Level  4  -BL      Subjective Pain Comment  L thumb cmc  -BL         Splint Form    Splint Type  Hand based  -BL      Immobilized with  Thumb in position of function with IPJ not immobilized CMC immobilized with added full extension to neutral   -BL      Select Digits  Thumb  -BL      Splint Purpose  Immobilize affected area;Increase function;Decrease pain;Provide correct positioning for thumb CMC  -BL      Use (daily wear)  For functional activities at work;For functional activities at home during heavy joint loading activities  -BL      Splint Use (overall time to wear splint)  4-6 weeks  -BL         Therapy Education    Education Details  Educated on role of thumb cmc splint fabricated this date along with joint protection techniques with pt verbalizing and demonstrating full understanding during functional lifting task  -BL      Given  HEP;Symptoms/condition management;Pain management splint wear schedule  -BL      Program  New  -BL      How Provided  Verbal;Demonstration  -BL      Provided to  Patient  -BL      Level of Understanding  Teach back education performed;Demonstrated;Verbalized  -BL        User Key  (r) = Recorded By, (t) = Taken By, (c) = Cosigned By    Initials Name Provider Type     Micki Tenorio COTA/L Occupational Therapy Assistant                Therapy Education  Education Details: Educated on role of thumb cmc splint fabricated this date along with joint protection techniques with pt verbalizing and demonstrating full understanding during functional lifting task  Given: HEP, Symptoms/condition management, Pain management(splint wear schedule)  Program: New  How Provided: Verbal, Demonstration  Provided to: Patient  Level of Understanding: Teach back education performed, Demonstrated,  Verbalized    OT Assessment/Plan     Row Name 11/15/18 1447          OT Assessment    Assessment Comments  Pt here this date after OT recert tolerating OT tx well. Pt was able to verbalize and demonstrate understanding of donning splint and full splint schedule. Pt educated to verbalize understanding of wear schedule. Pt will remain appropriate for skilled OT servcies to address decreased strength and functional independence with ADL/IADL's.  -BL        OT Plan    OT Frequency  2x/week  -       User Key  (r) = Recorded By, (t) = Taken By, (c) = Cosigned By    Initials Name Provider Type     Micki Tenorio, RODRIGUEZ/L Occupational Therapy Assistant           OT Goals     Row Name 11/15/18 1441          Long Term Goals    LTG Date to Achieve  -- by d/c  -BL     LTG 1  Pt will be independent and compliant with progressive HEP as noted on 3/3 sessions.   -BL     LTG 1 Progress  Met;Ongoing  (Significant)   -BL     LTG 2  Pt will report/demonstrate independence with edema management technqiues as noted on 2/3 sessions.   -BL     LTG 2 Progress  Met;Ongoing  (Significant)   -BL     LTG 3  Pt will improve left  strength to WFL per age and gender norms to improve functional independence with ADLs as noted on 2/3 sessions.   -BL     LTG 3 Progress  Progressing;Ongoing  -BL     LTG 4  Pt will independently report/demonstrate joint protection techniques for left hand as noted on 2/3 sessions.   -BL     LTG 4 Progress  Partially Met;Ongoing  (Significant)   -BL     LTG 5  Pt will report improvement in pain in left hand to </= 2/10 pain with AROM as noted on 2/3 sessions.   -     LTG 5 Progress  Not Met  -BL        Time Calculation    OT Goal Re-Cert Due Date  12/13/18  -       User Key  (r) = Recorded By, (t) = Taken By, (c) = Cosigned By    Initials Name Provider Type     Micki Tenorio, RODRIGUEZ/L Occupational Therapy Assistant        Modalities     Row Name 11/15/18 1448             Precautions    Existing  Precautions/Restrictions  no known precautions/restrictions  -BL         Functional Mobility    Functional Mobility- Ind. Level  independent  -BL         Parrafin    Paraffin [id number] Location  L hand  -BL      Rx Minutes  5 minutes  -BL        User Key  (r) = Recorded By, (t) = Taken By, (c) = Cosigned By    Initials Name Provider Type    Micki Quach COTA/L Occupational Therapy Assistant        OT Exercises     Row Name 11/15/18 1445             Subjective Pain    Able to rate subjective pain?  yes  -BL      Pre-Treatment Pain Level  4  -BL      Post-Treatment Pain Level  4  -BL         Exercise 1    Exercise Name 1  Functional lifting task with splint donned for decreased pain; pt was able to demonstate full understanding of donning splint independently and was able to lift box with no additional pain this date at L thumb  -BL      Cueing 1  Verbal;Demo  -BL      Equipment 1  -- 5 pound weighted box  -BL      Weights/Plates 1  5  -BL      Sets 1  2  -BL      Reps 1  3  -BL      Intensity 1  Mild  -BL        User Key  (r) = Recorded By, (t) = Taken By, (c) = Cosigned By    Initials Name Provider Type    Micki Quach RODRIGUEZ/L Occupational Therapy Assistant                      Time Calculation:   OT Start Time: 1445  OT Stop Time: 1533  OT Time Calculation (min): 48 min  Total Timed Code Minutes- OT: 48 minute(s)    Therapy Suggested Charges     Code   Minutes Charges    None              Therapy Charges for Today     Code Description Service Date Service Provider Modifiers Qty    70919531493  OT ORTHOTIC MGMT/TRAIN EA 15 MIN 11/15/2018 Micki Tenorio RODRIGUEZ/L GO 3    47305734103 HC OT THER SUPP EA 15 MIN 11/15/2018 Micki Tenorio RODRIGUEZ/LORENZO GO 1                    MICHAEL Pradhan/LORENZO  11/15/2018

## 2018-11-15 NOTE — THERAPY PROGRESS REPORT/RE-CERT
Outpatient Occupational Therapy Rehab Program Re-Assessment  [facility]     Patient Name: Nerissa Lopez  : 1965  MRN: [id number]  Today's Date: 11/15/2018      Visit Date: 11/15/2018   Visit Number: 5/5  % Improvement: 40%  Recert Date: 218  MD visit: TBD    Insurance Visits Approved: 8 total (32 units)      Patient Active Problem List   Diagnosis   • Hyperthyroidism   • Osteoporosis   • Vitamin D deficiency   • Menopausal syndrome (hot flashes)   • Anal fistula   • Anemia   • Astigmatism   • Death of family member   • Gastroesophageal reflux disease   • Generalized anxiety disorder   • Goiter   • Graves' disease   • Hematuria   • Lesion of eyelid   • Lumbosacral spondylolysis   • Menopausal syndrome   • Myopia   • Lumbar back pain   • Panic attack   • Presbyopia   • Takotsubo cardiomyopathy   • Thoracic spondylosis   • Spondylolisthesis of lumbar region        Past Medical History:   Diagnosis Date   • Anal fistula    • Anemia    • Astigmatism    • Broken heart syndrome 2016    when mother passed away   • Chronic pain disorder    • Death of family member     Mother passes away.   • Generalized anxiety disorder    • GERD (gastroesophageal reflux disease)    • Goiter    • Graves disease    • Heartburn    • Hematuria    • Hyperthyroidism    • Lesion of eyelid     LLL   • Low back pain    • Lumbosacral spondylosis    • Menopausal syndrome    • Myopia    • Osteoporosis    • Pain in back     Lumbar region   • Pain in joint involving left lower leg    • Pain in wrist    • Panic attack    • Presbyopia    • Right upper quadrant pain    • Takotsubo cardiomyopathy    • Thoracic spondylosis         Past Surgical History:   Procedure Laterality Date   • CARDIAC CATHETERIZATION  2016    Normal coronaries with no obstructive disease. Nonischemic stress induced cardiomyopathy known as Takotsubo syndrome.   • COLONOSCOPY  2016    Removal of anal fistula   • CYST REMOVAL Right     right breast    • EYELID CARCINOMA EXCISION  06/15/2015   • HYSTERECTOMY  04/14/2014    Total abdominal hysterectomy, bilateral salpingo-oophorectomy. Menorrhagia and leiomyomatous uterus.   • SKIN BIOPSY  06/29/2014   • TUBAL ABDOMINAL LIGATION     • WOUND CLOSURE  07/20/2014    Layer closure of wound.   • WOUND CLOSURE  01/16/2014    Layer closure of wound         Visit Dx:    ICD-10-CM ICD-9-CM   1. Impaired mobility and activities of daily living Z74.09 799.89   2. Impaired instrumental activities of daily living (IADL) R53.81 799.3   3. Weakness R53.1 780.79             OT Neuro     Row Name 11/15/18 1445 11/15/18 1430 11/15/18 1345       Subjective Comments    Subjective Comments  Pt here after OT recert stating her L thumb is feeling overall 40% improved since beginning with OT. Pt states the swelling is decreased overall in L thumb and she is more functional at home with L hand. Pt did not state new concerns and does not have any upcoming MD appointments. Still no word from on referral from Rheumatologist at this time; will continue to follow on that referral.  -BL  Pt present reporting 100% compliance with HEP and 40% subjective improvement for her left thumb since beginning skilled OT services. Pt states the swelling is decreased overall in L thumb and she is more functional at home with L hand. No new concerns at this time.   -MR  --  -BL       Precautions and Contraindications    Precautions/Limitations  no known precautions/limitations  -BL  no known precautions/limitations  -MR  --  -BL       Subjective Pain    Able to rate subjective pain?  --  yes  -MR  --  -BL    Pre-Treatment Pain Level  --  4  -MR  --  -BL    Post-Treatment Pain Level  --  4  -MR  --  -BL    Subjective Pain Comment  --  L thumb CMC  -MR  --  -BL       Cognitive Assessment/Intervention    Current Cognitive/Communication Assessment  functional  -BL  functional  -MR  --  -BL    Orientation Status (Cognition)  oriented x 4  -BL  oriented x 4  -MR   --  -BL    Follows Commands (Cognition)  WNL;WFL  -BL  WFL;WNL  -MR  --  -BL       Sensation    Light Touch  No apparent deficits  -BL  No apparent deficits  -MR  --  -BL       General ROM    GENERAL ROM COMMENTS  --  WFL AROM for left hand  -MR  --       MMT (Manual Muscle Testing)    General MMT Comments  --  BUE grossly WFL 4+/5; please see  strength as deficits are noted  -MR  --       Functional Mobility    Functional Mobility- Ind. Level  --  independent  -MR  --      User Key  (r) = Recorded By, (t) = Taken By, (c) = Cosigned By    Initials Name Provider Type    BL Micki Tenorio, RODRIGUEZ/L Occupational Therapy Assistant    MR Laura Garcia, OT Occupational Therapist           Hand Therapy (last 24 hours)      Hand Eval     Row Name 11/15/18 1445 11/15/18 1430          Subjective Comments    Subjective Comments  Please see Neuro subjective.  -BL  Please see OT neurp  -MR        Subjective Pain    Able to rate subjective pain?  yes  -BL  yes  -MR     Pre-Treatment Pain Level  4  -BL  4  -MR     Post-Treatment Pain Level  4  -BL  4  -MR     Subjective Pain Comment  L thumb cmc  -BL  L thumb CMC  -MR        Splint Form    Splint Type  Hand based  -BL  --     Immobilized with  Thumb in position of function with IPJ not immobilized CMC immobilized with added full extension to neutral   -BL  --     Select Digits  Thumb  -BL  --     Splint Purpose  Immobilize affected area;Increase function;Decrease pain;Provide correct positioning for thumb CMC  -BL  --     Use (daily wear)  For functional activities at work;For functional activities at home during heavy joint loading activities  -BL  --     Splint Use (overall time to wear splint)  4-6 weeks  -BL  --         Strength Right    # Reps  --  2  -MR     Right Rung  --  2  -MR     Right  Test 1  --  48  -MR     Right  Test 2  --  48  -MR      Strength Average Right  --  48  -MR         Strength Left    # Reps  --  2  -MR     Left Rung  --  2   -MR     Left  Test 1  --  35  -MR     Left  Test 2  --  36  -MR      Strength Average Left  --  35.5  -MR        Right Hand Strength - Pinch (lbs)    R Hand Pinch Strength  --  10  -MR     Lateral  --  13 lbs  -MR     Three Jaw Fernando  --  12 lbs  -MR        Left Hand Strength - Pinch (lbs)    L Hand Pinch Strength  --  6  -MR     Lateral  --  13 lbs  -MR     Three Jaw Fernando  --  8 lbs  -MR        Therapy Education    Education Details  Educated on role of thumb cmc splint fabricated this date along with joint protection techniques with pt verbalizing and demonstrating full understanding during functional lifting task  -BL  Pt educated on updated POC and compliance with HEP  -MR     Given  HEP;Symptoms/condition management;Pain management splint wear schedule  -BL  HEP;Symptoms/condition management  -MR     Program  New  -BL  Reinforced;Progressed  -MR     How Provided  Verbal;Demonstration  -BL  Verbal  -MR     Provided to  Patient  -  Patient  -MR     Level of Understanding  Teach back education performed;Demonstrated;Verbalized  -BL  Verbalized  -MR       User Key  (r) = Recorded By, (t) = Taken By, (c) = Cosigned By    Initials Name Provider Type     Micki Tenorio, MICHAEL/L Occupational Therapy Assistant    MR Laura Garcia, OT Occupational Therapist                Therapy Education  Education Details: Educated on role of thumb cmc splint fabricated this date along with joint protection techniques with pt verbalizing and demonstrating full understanding during functional lifting task  Given: HEP, Symptoms/condition management, Pain management(splint wear schedule)  Program: New  How Provided: Verbal, Demonstration  Provided to: Patient  Level of Understanding: Teach back education performed, Demonstrated, Verbalized    OT Goals     Row Name 11/15/18 1659 11/15/18 1445       Long Term Goals    LTG Date to Achieve  --  -- by d/c  -BL    LTG 1  --  Pt will be independent and compliant with  progressive HEP as noted on 3/3 sessions.   -BL    LTG 1 Progress  --  Met;Ongoing  (Significant)   -    LTG 2  --  Pt will report/demonstrate independence with edema management technqiues as noted on 2/3 sessions.   -BL    LTG 2 Progress  --  Met;Ongoing  (Significant)   -    LTG 3  --  Pt will improve left  strength to WFL per age and gender norms to improve functional independence with ADLs as noted on 2/3 sessions.   -    LTG 3 Progress  --  Progressing;Ongoing  -    LTG 4  --  Pt will independently report/demonstrate joint protection techniques for left hand as noted on 2/3 sessions.   -    LTG 4 Progress  --  Partially Met;Ongoing  (Significant)   -    LTG 5  --  Pt will report improvement in pain in left hand to </= 2/10 pain with AROM as noted on 2/3 sessions.   -    LTG 5 Progress  --  Not Met  -       Time Calculation    OT Goal Re-Cert Due Date  12/13/18  -MR  12/13/18  -    Row Name 11/15/18 1430          Long Term Goals    LTG Date to Achieve  -- by d/c  -MR     LTG 1  Pt will be independent and compliant with progressive HEP as noted on 3/3 sessions.   -MR     LTG 1 Progress  Met;Ongoing  -MR     LTG 2  Pt will report/demonstrate independence with edema management technqiues as noted on 2/3 sessions.   -MR     LTG 2 Progress  Met;Ongoing  -MR     LTG 3  Pt will improve left  strength to WFL per age and gender norms to improve functional independence with ADLs as noted on 2/3 sessions.   -MR     LTG 3 Progress  Progressing;Ongoing  -MR     LTG 4  Pt will independently report/demonstrate joint protection techniques for left hand as noted on 2/3 sessions.   -MR     LTG 4 Progress  Partially Met;Ongoing  -MR     LTG 5  Pt will report improvement in pain in left hand to </= 2/10 pain with AROM as noted on 2/3 sessions.   -MR     LTG 5 Progress  Not Met  -MR     LTG 6  Pt will demonstate independence with doffing/donning splint and tolerate according to recommended wearing schedule.    -MR     LTG 6 Progress  New  -MR        Time Calculation    OT Goal Re-Cert Due Date  12/13/18  -MR       User Key  (r) = Recorded By, (t) = Taken By, (c) = Cosigned By    Initials Name Provider Type    Micki Quach COTA/L Occupational Therapy Assistant    Laura Guillory, OT Occupational Therapist        OT Assessment/Plan     Row Name 11/15/18 1445 11/15/18 1430       OT Assessment    Assessment Comments  Pt here this date after OT recert tolerating OT tx well. Pt was able to verbalize and demonstrate understanding of donning splint and full splint schedule. Pt educated to verbalize understanding of wear schedule. Pt will remain appropriate for skilled OT servcies to address decreased strength and functional independence with ADL/IADL's.  -BL  OT re-assessment completed today. Pt has met 2 LTG and partially met 1 LTG. RODRIGUEZ/L present to fabricate splint after OT re-assessment this date. Pt could cont to benefit from skilled OT services to address decreased strength, activity tolerance, AROM, FMC/GMC, pain management and independence with ADLs.   -MR       OT Plan    OT Frequency  2x/week  -BL  --      User Key  (r) = Recorded By, (t) = Taken By, (c) = Cosigned By    Initials Name Provider Type    Micki Quach COTA/L Occupational Therapy Assistant    MR Garcia Laura STARK, OT Occupational Therapist        OT Exercises     Row Name 11/15/18 1445             Subjective Pain    Able to rate subjective pain?  yes  -BL      Pre-Treatment Pain Level  4  -BL      Post-Treatment Pain Level  4  -BL         Exercise 1    Exercise Name 1  Functional lifting task with splint donned for decreased pain; pt was able to demonstate full understanding of donning splint independently and was able to lift box with no additional pain this date at L thumb  -BL      Cueing 1  Verbal;Demo  -BL      Equipment 1  -- 5 pound weighted box  -BL      Weights/Plates 1  5  -BL      Sets 1  2  -BL      Reps 1  3  -BL       Intensity 1  Mild  -BL        User Key  (r) = Recorded By, (t) = Taken By, (c) = Cosigned By    Initials Name Provider Type    Micki Quach COTA/L Occupational Therapy Assistant            Outcome Measure Options: Quick DASH  Quick DASH  Open a tight or new jar.: Unable  Do heavy household chores (e.g., wash walls, wash floors): Moderate Difficulty  Carry a shopping bag or briefcase: Moderate Difficulty  Wash your back: Moderate Difficulty  Use a knife to cut food: Moderate Difficulty  Recreational activities in which you take some force or impact through your arm, should or hand (e.g. golf, hammering, tennis, etc.): Moderate Difficulty  During the past week, to what extent has your arm, shoulder, or hand problem interfered with your normal social activites with family, friends, neighbors or groups?: Not at all  During the past week, were you limited in your work or other regular daily activities as a result of your arm, shoulder or hand problem?: Moderately Limited  Arm, Shoulder, or hand pain: Moderate  Tingling (pins and needles) in your arm, shoulder, or hand: Mild  During the past week, how much difficulty have you had sleeping because of the pain in your arm, shoulder or hand?: Mild Difficulty  Number of Questions Answered: 11  Quick DASH Score: 45.45         Time Calculation:   OT Start Time: 1430  OT Stop Time: 1445  OT Time Calculation (min): 15 min  Total Timed Code Minutes- OT: 15 minute(s)     Therapy Charges for Today     Code Description Service Date Service Provider Modifiers Qty    34948238340 HC OT SELFCARE CURRENT 11/15/2018 Laura Garcia OT GO, CJ 1    96722861330 HC OT SELFCARE PROJECTED 11/15/2018 Laura Garcia OT GO, CI 1    [id number]  OT THERAPEUTIC ACT EA 15 MIN 11/15/2018 Laura Garcia OT GO 1          OT G-codes  OT Professional Judgement Used?: Yes  OT Functional Scales Options: Quick DASH  Functional Limitation: Self care  Self Care Current Status (): At  least 20 percent but less than 40 percent impaired, limited or restricted  Self Care Goal Status (): At least 1 percent but less than 20 percent impaired, limited or restricted       Laura Garcia, OT  11/15/2018

## 2018-11-19 ENCOUNTER — APPOINTMENT (OUTPATIENT)
Dept: OCCUPATIONAL THERAPY | Facility: HOSPITAL | Age: 53
End: 2018-11-19

## 2018-11-20 ENCOUNTER — LAB (OUTPATIENT)
Dept: LAB | Facility: HOSPITAL | Age: 53
End: 2018-11-20

## 2018-11-20 ENCOUNTER — OFFICE VISIT (OUTPATIENT)
Dept: FAMILY MEDICINE CLINIC | Facility: CLINIC | Age: 53
End: 2018-11-20

## 2018-11-20 VITALS
SYSTOLIC BLOOD PRESSURE: 104 MMHG | HEIGHT: 65 IN | DIASTOLIC BLOOD PRESSURE: 74 MMHG | WEIGHT: 149.3 LBS | BODY MASS INDEX: 24.87 KG/M2

## 2018-11-20 DIAGNOSIS — Z76.89 ENCOUNTER TO ESTABLISH CARE: ICD-10-CM

## 2018-11-20 DIAGNOSIS — Z13.220 SCREENING FOR HYPERCHOLESTEROLEMIA: ICD-10-CM

## 2018-11-20 DIAGNOSIS — I73.9 CLAUDICATION (HCC): ICD-10-CM

## 2018-11-20 DIAGNOSIS — M25.40 JOINT SWELLING: ICD-10-CM

## 2018-11-20 DIAGNOSIS — E05.90 HYPERTHYROIDISM: ICD-10-CM

## 2018-11-20 DIAGNOSIS — N95.1 MENOPAUSAL SYNDROME (HOT FLASHES): ICD-10-CM

## 2018-11-20 DIAGNOSIS — E55.9 VITAMIN D DEFICIENCY: ICD-10-CM

## 2018-11-20 DIAGNOSIS — E05.00 GRAVES' DISEASE: ICD-10-CM

## 2018-11-20 DIAGNOSIS — M81.0 AGE-RELATED OSTEOPOROSIS WITHOUT CURRENT PATHOLOGICAL FRACTURE: ICD-10-CM

## 2018-11-20 DIAGNOSIS — F41.1 GENERALIZED ANXIETY DISORDER: Primary | ICD-10-CM

## 2018-11-20 DIAGNOSIS — Z12.39 SCREENING FOR BREAST CANCER: ICD-10-CM

## 2018-11-20 DIAGNOSIS — Z79.899 HIGH RISK MEDICATION USE: ICD-10-CM

## 2018-11-20 LAB
25(OH)D3 SERPL-MCNC: 57.3 NG/ML (ref 30–100)
ALBUMIN SERPL-MCNC: 4.4 G/DL (ref 3.4–4.8)
ALBUMIN/GLOB SERPL: 1.4 G/DL (ref 1.1–1.8)
ALP SERPL-CCNC: 41 U/L (ref 38–126)
ALT SERPL W P-5'-P-CCNC: 31 U/L (ref 9–52)
AMPHET+METHAMPHET UR QL: NEGATIVE
ANION GAP SERPL CALCULATED.3IONS-SCNC: 10 MMOL/L (ref 5–15)
ARTICHOKE IGE QN: 95 MG/DL (ref 1–129)
AST SERPL-CCNC: 40 U/L (ref 14–36)
BARBITURATES UR QL SCN: NEGATIVE
BASOPHILS # BLD AUTO: 0.03 10*3/MM3 (ref 0–0.2)
BASOPHILS NFR BLD AUTO: 0.6 % (ref 0–2)
BENZODIAZ UR QL SCN: NEGATIVE
BILIRUB SERPL-MCNC: 0.5 MG/DL (ref 0.2–1.3)
BUN BLD-MCNC: 8 MG/DL (ref 7–21)
BUN/CREAT SERPL: 14.8 (ref 7–25)
CALCIUM SPEC-SCNC: 8.5 MG/DL (ref 8.4–10.2)
CANNABINOIDS SERPL QL: NEGATIVE
CHLORIDE SERPL-SCNC: 103 MMOL/L (ref 95–110)
CHOLEST SERPL-MCNC: 191 MG/DL (ref 0–199)
CO2 SERPL-SCNC: 25 MMOL/L (ref 22–31)
COCAINE UR QL: NEGATIVE
CREAT BLD-MCNC: 0.54 MG/DL (ref 0.5–1)
DEPRECATED RDW RBC AUTO: 41 FL (ref 36.4–46.3)
EOSINOPHIL # BLD AUTO: 0.25 10*3/MM3 (ref 0–0.7)
EOSINOPHIL NFR BLD AUTO: 4.7 % (ref 0–7)
ERYTHROCYTE [DISTWIDTH] IN BLOOD BY AUTOMATED COUNT: 12.3 % (ref 11.5–14.5)
GFR SERPL CREATININE-BSD FRML MDRD: 119 ML/MIN/1.73 (ref 51–120)
GLOBULIN UR ELPH-MCNC: 3.1 GM/DL (ref 2.3–3.5)
GLUCOSE BLD-MCNC: 91 MG/DL (ref 60–100)
HCT VFR BLD AUTO: 38.8 % (ref 35–45)
HDLC SERPL-MCNC: 72 MG/DL (ref 60–200)
HGB BLD-MCNC: 13.1 G/DL (ref 12–15.5)
IMM GRANULOCYTES # BLD: 0.01 10*3/MM3 (ref 0–0.02)
IMM GRANULOCYTES NFR BLD: 0.2 % (ref 0–0.5)
LDLC/HDLC SERPL: 1.46 {RATIO} (ref 0–3.22)
LYMPHOCYTES # BLD AUTO: 2.02 10*3/MM3 (ref 0.6–4.2)
LYMPHOCYTES NFR BLD AUTO: 38.3 % (ref 10–50)
MCH RBC QN AUTO: 30.7 PG (ref 26.5–34)
MCHC RBC AUTO-ENTMCNC: 33.8 G/DL (ref 31.4–36)
MCV RBC AUTO: 90.9 FL (ref 80–98)
METHADONE UR QL SCN: NEGATIVE
MONOCYTES # BLD AUTO: 0.3 10*3/MM3 (ref 0–0.9)
MONOCYTES NFR BLD AUTO: 5.7 % (ref 0–12)
NEUTROPHILS # BLD AUTO: 2.66 10*3/MM3 (ref 2–8.6)
NEUTROPHILS NFR BLD AUTO: 50.5 % (ref 37–80)
OPIATES UR QL: NEGATIVE
OXYCODONE UR QL SCN: NEGATIVE
PLATELET # BLD AUTO: 307 10*3/MM3 (ref 150–450)
PMV BLD AUTO: 9.6 FL (ref 8–12)
POTASSIUM BLD-SCNC: 4.6 MMOL/L (ref 3.5–5.1)
PROT SERPL-MCNC: 7.5 G/DL (ref 6.3–8.6)
RBC # BLD AUTO: 4.27 10*6/MM3 (ref 3.77–5.16)
SODIUM BLD-SCNC: 138 MMOL/L (ref 137–145)
T4 FREE SERPL-MCNC: 0.74 NG/DL (ref 0.78–2.19)
TRIGL SERPL-MCNC: 68 MG/DL (ref 20–199)
TSH SERPL DL<=0.05 MIU/L-ACNC: 0.19 MIU/ML (ref 0.46–4.68)
VIT B12 BLD-MCNC: 828 PG/ML (ref 239–931)
WBC NRBC COR # BLD: 5.27 10*3/MM3 (ref 3.2–9.8)

## 2018-11-20 PROCEDURE — 80050 GENERAL HEALTH PANEL: CPT | Performed by: NURSE PRACTITIONER

## 2018-11-20 PROCEDURE — 84445 ASSAY OF TSI GLOBULIN: CPT

## 2018-11-20 PROCEDURE — 80307 DRUG TEST PRSMV CHEM ANLYZR: CPT | Performed by: NURSE PRACTITIONER

## 2018-11-20 PROCEDURE — 82306 VITAMIN D 25 HYDROXY: CPT

## 2018-11-20 PROCEDURE — 36415 COLL VENOUS BLD VENIPUNCTURE: CPT

## 2018-11-20 PROCEDURE — 82607 VITAMIN B-12: CPT

## 2018-11-20 PROCEDURE — 86038 ANTINUCLEAR ANTIBODIES: CPT | Performed by: NURSE PRACTITIONER

## 2018-11-20 PROCEDURE — 86430 RHEUMATOID FACTOR TEST QUAL: CPT | Performed by: NURSE PRACTITIONER

## 2018-11-20 PROCEDURE — 80061 LIPID PANEL: CPT

## 2018-11-20 PROCEDURE — 84481 FREE ASSAY (FT-3): CPT

## 2018-11-20 PROCEDURE — 99214 OFFICE O/P EST MOD 30 MIN: CPT | Performed by: NURSE PRACTITIONER

## 2018-11-20 PROCEDURE — 84439 ASSAY OF FREE THYROXINE: CPT

## 2018-11-20 RX ORDER — LORAZEPAM 0.5 MG/1
0.5 TABLET ORAL DAILY PRN
Qty: 30 TABLET | Refills: 0 | Status: SHIPPED | OUTPATIENT
Start: 2018-11-20 | End: 2019-12-10 | Stop reason: SDUPTHER

## 2018-11-20 NOTE — PROGRESS NOTES
"Elizabeth Lopez is a 53 y.o. female.   Establish primary care.   Has history of anxiety and Graves' disease.  Her anxiety is currently well controlled with Ativan and Lexapro.  Anxiety began after losing her  to cancer and then losing her mother unexpectedly a couple of years later.  Currently sees Dr. Dwaine Elizabeth for her Graves'.  Complains of right lower leg pain when walking.  \"I have such bad varicose veins that my legs swells a lot.\"  Also reports that her mother had a history of rheumatoid and osteoarthritis and she is now been complaining of swelling to the joints on her hands.    Anxiety   Presents for follow-up visit. Patient reports no chest pain, compulsions, confusion, decreased concentration, depressed mood, dizziness, dry mouth, excessive worry, feeling of choking, hyperventilation, impotence, insomnia, irritability, malaise, muscle tension, nausea, obsessions, palpitations, panic, restlessness, shortness of breath or suicidal ideas. Symptoms occur occasionally. The severity of symptoms is mild. The quality of sleep is fair. Nighttime awakenings: occasional.     Compliance with medications is %.   Thyroid Problem   Presents for follow-up visit. Patient reports no depressed mood, diaphoresis, fatigue or palpitations. The symptoms have been stable.   Lower Extremity Issue   This is a chronic problem. The current episode started more than 1 year ago. The problem occurs constantly. The problem has been unchanged. Associated symptoms include arthralgias, joint swelling and myalgias. Pertinent negatives include no chest pain, chills, diaphoresis, fatigue, fever or nausea. The symptoms are aggravated by walking. The treatment provided no relief.   Joint Swelling   This is a chronic problem. The current episode started more than 1 year ago. The problem occurs constantly. The problem has been waxing and waning. Associated symptoms include arthralgias, joint swelling and myalgias. " Pertinent negatives include no chest pain, chills, diaphoresis, fatigue, fever or nausea. Nothing aggravates the symptoms. She has tried acetaminophen and NSAIDs (PT) for the symptoms. The treatment provided mild relief.        The following portions of the patient's history were reviewed and updated as appropriate: allergies, current medications, past family history, past medical history, past social history, past surgical history and problem list.       Review of Systems   Constitutional: Negative.  Negative for chills, diaphoresis, fatigue, fever and irritability.   HENT: Negative.    Eyes: Negative.    Respiratory: Negative.  Negative for shortness of breath.    Cardiovascular: Positive for leg swelling. Negative for chest pain and palpitations.   Gastrointestinal: Negative.  Negative for nausea.   Genitourinary: Negative.  Negative for impotence.   Musculoskeletal: Positive for arthralgias, joint swelling and myalgias.   Skin: Negative.    Neurological: Negative.  Negative for dizziness.   Psychiatric/Behavioral: Negative.  Negative for confusion, decreased concentration and suicidal ideas. The patient does not have insomnia.        Objective   Physical Exam   Constitutional: She is oriented to person, place, and time. She appears well-developed and well-nourished.   HENT:   Head: Normocephalic.   Right Ear: External ear normal.   Left Ear: External ear normal.   Eyes: EOM are normal. Pupils are equal, round, and reactive to light.   Neck: Normal range of motion. Neck supple.   Cardiovascular: Normal rate, regular rhythm and normal heart sounds.   Pulmonary/Chest: Effort normal and breath sounds normal.   Abdominal: Soft. Bowel sounds are normal.   Musculoskeletal: Normal range of motion.   Moderate amount of swelling to bilateral MIP   Neurological: She is alert and oriented to person, place, and time.   Skin: Skin is warm. Capillary refill takes less than 2 seconds.   Multiple varicosities to bilateral lower  extremities   Psychiatric: She has a normal mood and affect. Her behavior is normal.   Nursing note and vitals reviewed.      Assessment/Plan   Problems Addressed this Visit        Endocrine    Graves' disease       Other    Generalized anxiety disorder - Primary    Relevant Medications    LORazepam (ATIVAN) 0.5 MG tablet      Other Visit Diagnoses     Claudication (CMS/HCC)        Relevant Orders    Duplex Venous Lower Extremity - Bilateral CAR    Joint swelling        Relevant Orders    ERNIE    Rheumatoid Factor    High risk medication use        Relevant Orders    Urine Drug Screen - Urine, Clean Catch    Screening for hypercholesterolemia        Relevant Orders    Lipid panel    Screening for breast cancer        Relevant Orders    Mammo Screening Digital Tomosynthesis Bilateral With CAD    Encounter to establish care            1.  Generalized anxiety disorder:  Continue on Lexapro and Xanax as previously prescribed and Xanax prescription provided the patient  Educated on possible side effects of this medication including but not limited to increased risk for sedation and dependency    2.  Graves' disease:  Complete TSH, free T4 and T3 as ordered per Dr. Dwaine Elizabeth   Will continue endocrinology follow-up with Dr. Dwaine Elizabeth    3.  Claudication:  Heart and vascular will call to schedule venous duplex of bilateral lower extremities will notify of results when available  Encouraged the use of compression stockings daily    4.  Joint swelling:  Complete ERNIE and rheumatoid factor as ordered and will notify of results when available    5.  High risk medication:  BRENDAN reviewed by me and will be scanned into medical record  Controlled substance contract signed and dated and will be scanned into medical record  Complete urine drug screen as ordered     6.  Screening for hypercholesterolemia:  Complete fasting lipid panel as ordered and will notify of results when available  Encouraged to adhere to low-fat  diet    7.  Screening for breast cancer:  Radiology will call to schedule bilateral mammogram and will notify of results when available  Encouraged monthly self breast exams at home    8.  Encounter to establish care:  Continue on current medications as previously prescribed   Schedule follow-up appointment with this office in 3 months for recheck or sooner as needed        This document has been electronically signed by YOANA Arguello on November 20, 2018 8:28 AM

## 2018-11-21 LAB
ANA SER QL: NEGATIVE
T3FREE SERPL-MCNC: 3.1 PG/ML (ref 2–4.4)
TSI SER-MCNC: <0.1 IU/L (ref 0–0.55)

## 2018-11-22 LAB — RHEUMATOID FACT SERPL-ACNC: NEGATIVE [IU]/ML

## 2018-11-26 ENCOUNTER — DOCUMENTATION (OUTPATIENT)
Dept: OCCUPATIONAL THERAPY | Facility: HOSPITAL | Age: 53
End: 2018-11-26

## 2018-11-26 NOTE — THERAPY DISCHARGE NOTE
Outpatient Occupational Therapy Discharge Summary         Patient Name: Nerissa Lopez  : 1965  MRN: [id number]    Today's Date: 2018      Visit Date: 2018           OP OT Discharge Summary  Date of Discharge: 18  Reason for Discharge: Patient/Caregiver request  Outcomes Achieved: Patient able to partially acheive established goals  Discharge Destination: Home with home program  Discharge Instructions: OTR/L spoke with pt this date regarding request to d/c from skilled OT services. Pt reports she is compliant with extensive HEP and her splint has not caused any redness or irritation and she is wearing according to instructions. Pt reports that she does feel she improved but wishes to d/c at this time. Pt was able to met 2/6 LTG and partially met 1/6 LTG upon d/c. Pt educated to contact OT in the future if any questions or concerns arise or if functional decline is noted.       Time Calculation:         Therapy Suggested Charges     Code   Minutes Charges    None                             Laura Garcia OT   2018

## 2018-11-27 ENCOUNTER — OFFICE VISIT (OUTPATIENT)
Dept: ENDOCRINOLOGY | Facility: CLINIC | Age: 53
End: 2018-11-27

## 2018-11-27 VITALS
HEIGHT: 65 IN | DIASTOLIC BLOOD PRESSURE: 66 MMHG | WEIGHT: 149 LBS | OXYGEN SATURATION: 98 % | BODY MASS INDEX: 24.83 KG/M2 | HEART RATE: 76 BPM | SYSTOLIC BLOOD PRESSURE: 110 MMHG

## 2018-11-27 DIAGNOSIS — M81.0 OSTEOPOROSIS, UNSPECIFIED OSTEOPOROSIS TYPE, UNSPECIFIED PATHOLOGICAL FRACTURE PRESENCE: ICD-10-CM

## 2018-11-27 DIAGNOSIS — E89.0 POSTABLATIVE HYPOTHYROIDISM: ICD-10-CM

## 2018-11-27 DIAGNOSIS — E55.9 VITAMIN D DEFICIENCY: ICD-10-CM

## 2018-11-27 DIAGNOSIS — E05.00 GRAVES' DISEASE: Primary | ICD-10-CM

## 2018-11-27 PROCEDURE — 99214 OFFICE O/P EST MOD 30 MIN: CPT | Performed by: INTERNAL MEDICINE

## 2018-11-27 RX ORDER — LEVOTHYROXINE SODIUM 25 UG/1
25 CAPSULE ORAL DAILY
Qty: 30 CAPSULE | Refills: 11 | Status: SHIPPED | OUTPATIENT
Start: 2018-11-27 | End: 2018-11-30

## 2018-11-27 NOTE — PROGRESS NOTES
Elizabeth Lopez is a 53 y.o. female. she is here for follow up     Hyperthyroidism   Pertinent negatives include no abdominal pain, arthralgias, chest pain, coughing, diaphoresis, fatigue, headaches, joint swelling, myalgias, nausea, neck pain, numbness, rash, sore throat, vomiting or weakness.          Primary Care/Referring Provider  Raiza Saini MD   follow up      reason - subclinical hyperthyroidism and osteoporosis     from 2-15 neg tsi and tpo ab and toxic subcm MNG , most likely still Graves'     She had MADERA ablation Sept 7, 2018     Now became hypothyroid      Thyroid US      bilateral sucm complex thyroid cysts  maximal dimension 5 mm      US dimensions - within normal limtis     personally reviewed from July 201 4 and stable when compared to July 2016     ==============     symptoms - anxiety       Component      Latest Ref Rng & Units 7/24/2018 10/4/2018 11/20/2018   TSH Baseline      0.460 - 4.680 mIU/mL 0.230 (L) 0.040 (L) 0.190 (L)   Free T4      0.78 - 2.19 ng/dL 0.90 1.19 0.74 (L)   T3, Free      2.0 - 4.4 pg/mL  4.0 3.1   Thyroid Stimulating Immunoglobulin      0.00 - 0.55 IU/L   <0.10               Evaluation history:  TSH   Date Value Ref Range Status   11/20/2018 0.190 (L) 0.460 - 4.680 mIU/mL Final     Free T4   Date Value Ref Range Status   11/20/2018 0.74 (L) 0.78 - 2.19 ng/dL Final     T3, Free   Date Value Ref Range Status   11/20/2018 3.1 2.0 - 4.4 pg/mL Final       Current medications:  Current Outpatient Medications   Medication Sig Dispense Refill   • calcium carbonate 1250 MG capsule Take 600 mg by mouth.     • escitalopram (LEXAPRO) 10 MG tablet Take 1 tablet by mouth Daily. 90 tablet 1   • GLUCOSAMINE-CHONDROITIN PO Take  by mouth.     • HYDROcodone-acetaminophen (NORCO) 5-325 MG per tablet Take 1 tablet by mouth 2 (Two) Times a Day As Needed for Moderate Pain . (Patient taking differently: Take 1 tablet by mouth Every 6 (Six) Hours As Needed for Moderate  Pain .) 30 tablet 0   • LORazepam (ATIVAN) 0.5 MG tablet Take 1 tablet by mouth Daily As Needed for Anxiety. 30 tablet 0   • magnesium oxide (MAGOX) 400 (241.3 Mg) MG tablet tablet Take 800 mg by mouth Daily.     • metoprolol succinate XL (TOPROL XL) 25 MG 24 hr tablet Take 12.5 mg by mouth daily.     • Multiple Vitamins-Minerals (MULTIVITAMIN PO) Take 1 tablet by mouth daily.     • naproxen (NAPROSYN) 375 MG tablet Take 1 tablet by mouth 2 (Two) Times a Day With Meals. 60 tablet 3   • Omega-3 Fatty Acids (FISH OIL CONCENTRATE) 1000 MG capsule Take 1 capsule by mouth 2 (two) times a day.     • omeprazole (priLOSEC) 20 MG capsule Take 1 capsule by mouth Daily. 90 capsule 1   • tiZANidine (ZANAFLEX) 4 MG tablet TAKE 1 TABLET BY MOUTH EVERY 6 HOURS AS NEEDED FOR MUSCLE SPASMS 120 tablet 0   • vitamin E 400 UNIT capsule Take 400 Units by mouth daily.       No current facility-administered medications for this visit.        The following portions of the patient's history were reviewed and updated as appropriate:   Past Medical History:   Diagnosis Date   • Anal fistula    • Anemia    • Astigmatism    • Broken heart syndrome 03/02/2016    when mother passed away   • Chronic pain disorder    • Death of family member     Mother passes away.   • Generalized anxiety disorder    • GERD (gastroesophageal reflux disease)    • Goiter    • Graves disease    • Heartburn    • Hematuria    • Hyperthyroidism    • Lesion of eyelid     LLL   • Low back pain    • Lumbosacral spondylosis    • Menopausal syndrome    • Myopia    • Osteoporosis    • Pain in back     Lumbar region   • Pain in joint involving left lower leg    • Pain in wrist    • Panic attack    • Presbyopia    • Right upper quadrant pain    • Takotsubo cardiomyopathy    • Thoracic spondylosis      Past Surgical History:   Procedure Laterality Date   • CARDIAC CATHETERIZATION  03/02/2016    Normal coronaries with no obstructive disease. Nonischemic stress induced  cardiomyopathy known as Takotsubo syndrome.   • COLONOSCOPY  2016    Removal of anal fistula   • CYST REMOVAL Right     right breast   • EYELID CARCINOMA EXCISION  06/15/2015   • HYSTERECTOMY  2014    Total abdominal hysterectomy, bilateral salpingo-oophorectomy. Menorrhagia and leiomyomatous uterus.   • SKIN BIOPSY  2014   • TUBAL ABDOMINAL LIGATION     • WOUND CLOSURE  2014    Layer closure of wound.   • WOUND CLOSURE  2014    Layer closure of wound     Family History   Problem Relation Age of Onset   • Heart disease Mother    • Hyperlipidemia Mother    • Hypertension Mother    • Osteoporosis Mother    • Cancer Mother    • Cancer Father    • Diabetes Sister    • Thyroid disease Sister    • COPD Sister    • Hypertension Sister    • Migraines Sister    • Diabetes Brother    • COPD Brother    • Cancer Maternal Grandmother         Ovarian Cancer   • Breast cancer Other    • Cancer Other    • Other Other         Gall Bladder disease   • Breast cancer Other    • Breast cancer Maternal Aunt      OB History      Para Term  AB Living    3 3 3          SAB TAB Ectopic Molar Multiple Live Births                       No Known Allergies  Social History     Socioeconomic History   • Marital status:      Spouse name: Not on file   • Number of children: Not on file   • Years of education: Not on file   • Highest education level: Not on file   Tobacco Use   • Smoking status: Never Smoker   • Smokeless tobacco: Never Used   Substance and Sexual Activity   • Alcohol use: No   • Drug use: No   • Sexual activity: Defer       Review of Systems  Review of Systems   Constitutional: Negative for activity change, appetite change, diaphoresis and fatigue.   HENT: Negative for facial swelling, sneezing, sore throat, tinnitus, trouble swallowing and voice change.    Eyes: Negative for photophobia, pain, discharge, redness, itching and visual disturbance.   Respiratory: Negative for apnea,  "cough, choking, chest tightness and shortness of breath.    Cardiovascular: Negative for chest pain, palpitations and leg swelling.   Gastrointestinal: Negative for abdominal distention, abdominal pain, constipation, diarrhea, nausea and vomiting.   Endocrine: Negative for cold intolerance, heat intolerance, polydipsia, polyphagia and polyuria.   Genitourinary: Negative for difficulty urinating, dysuria, frequency, hematuria and urgency.   Musculoskeletal: Negative for arthralgias, back pain, gait problem, joint swelling, myalgias, neck pain and neck stiffness.   Skin: Negative for color change, pallor, rash and wound.   Neurological: Negative for dizziness, tremors, weakness, light-headedness, numbness and headaches.   Hematological: Negative for adenopathy. Does not bruise/bleed easily.   Psychiatric/Behavioral: Negative for behavioral problems, confusion and sleep disturbance.        Objective    /66   Pulse 76   Ht 165.1 cm (65\")   Wt 67.6 kg (149 lb)   LMP  (LMP Unknown)   SpO2 98%   BMI 24.79 kg/m²   Physical Exam   Constitutional: She is oriented to person, place, and time. She appears well-developed and well-nourished. No distress.   HENT:   Head: Normocephalic and atraumatic.   Right Ear: External ear normal.   Left Ear: External ear normal.   Nose: Nose normal.   Eyes: Conjunctivae and EOM are normal. Pupils are equal, round, and reactive to light.   Neck: Normal range of motion. Neck supple. No tracheal deviation present. No thyromegaly present.   Cardiovascular: Normal rate, regular rhythm and normal heart sounds.   No murmur heard.  Pulmonary/Chest: Effort normal and breath sounds normal. No respiratory distress. She has no wheezes.   Abdominal: Soft. Bowel sounds are normal. There is no tenderness. There is no rebound and no guarding.   Musculoskeletal: Normal range of motion. She exhibits no edema, tenderness or deformity.   Neurological: She is alert and oriented to person, place, and " time. No cranial nerve deficit.   Skin: Skin is warm and dry. No rash noted.   Psychiatric: She has a normal mood and affect. Her behavior is normal. Judgment and thought content normal.       Lab Review  Lab Results   Component Value Date    TSH 0.190 (L) 11/20/2018     Lab Results   Component Value Date    FREET4 0.74 (L) 11/20/2018        Assessment/Plan      1. Graves' disease    2. Hyperthyroidism    3. Osteoporosis, unspecified osteoporosis type, unspecified pathological fracture presence    4. Vitamin D deficiency    . This diagnosis was discussed and reviewed with the patient including the advantages of drug therapy.     1. Orders placed during this encounter include:  No orders of the defined types were placed in this encounter.      Medications prescribed:  Outpatient Encounter Medications as of 11/27/2018   Medication Sig Dispense Refill   • calcium carbonate 1250 MG capsule Take 600 mg by mouth.     • escitalopram (LEXAPRO) 10 MG tablet Take 1 tablet by mouth Daily. 90 tablet 1   • GLUCOSAMINE-CHONDROITIN PO Take  by mouth.     • HYDROcodone-acetaminophen (NORCO) 5-325 MG per tablet Take 1 tablet by mouth 2 (Two) Times a Day As Needed for Moderate Pain . (Patient taking differently: Take 1 tablet by mouth Every 6 (Six) Hours As Needed for Moderate Pain .) 30 tablet 0   • LORazepam (ATIVAN) 0.5 MG tablet Take 1 tablet by mouth Daily As Needed for Anxiety. 30 tablet 0   • magnesium oxide (MAGOX) 400 (241.3 Mg) MG tablet tablet Take 800 mg by mouth Daily.     • metoprolol succinate XL (TOPROL XL) 25 MG 24 hr tablet Take 12.5 mg by mouth daily.     • Multiple Vitamins-Minerals (MULTIVITAMIN PO) Take 1 tablet by mouth daily.     • naproxen (NAPROSYN) 375 MG tablet Take 1 tablet by mouth 2 (Two) Times a Day With Meals. 60 tablet 3   • Omega-3 Fatty Acids (FISH OIL CONCENTRATE) 1000 MG capsule Take 1 capsule by mouth 2 (two) times a day.     • omeprazole (priLOSEC) 20 MG capsule Take 1 capsule by mouth Daily.  90 capsule 1   • tiZANidine (ZANAFLEX) 4 MG tablet TAKE 1 TABLET BY MOUTH EVERY 6 HOURS AS NEEDED FOR MUSCLE SPASMS 120 tablet 0   • vitamin E 400 UNIT capsule Take 400 Units by mouth daily.       No facility-administered encounter medications on file as of 11/27/2018.      Hyperthyroidism             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 documented as back as 2014 and personally reviewed   Report from 2016 , compared to 2014 no change       She had MADERA ablation on Sept 7, 2018     Low Free T4 , start levothyroxine 25 mcgs daily           Osteoporosis     March 2016     Total Lumbar spine- 0.692 gm/cm2 T-Score -3.2      4 2017, repeat shows improvement on forteo   Next April 2019        Completed 2 years of forteo , started 27 of June 2016     Now on prolia       We were doing 50 th weekly but now   calcium + D once daily       Lab Results   Component Value Date    BZRH26SE 57.3 11/20/2018    SPSR82NN 60.2 10/04/2018    ZWBW69HQ 54.4 07/24/2018          PTH and calcium- nl    We discussed about risk of ONJ and atypical femur fractures      Menopausal syndrome (hot flashes)  I started estrogen patches but allergy     h o  Taktsubo, I prefer no estrogen      Doug started metoprolol             Lab Results   Component Value Date    CHOL 191 11/20/2018    CHLPL 171 03/31/2016    TRIG 68 11/20/2018    HDL 72 11/20/2018    LDL 95 11/20/2018     Off statins        4. No Follow-up on file.

## 2018-11-28 ENCOUNTER — TELEPHONE (OUTPATIENT)
Dept: FAMILY MEDICINE CLINIC | Facility: CLINIC | Age: 53
End: 2018-11-28

## 2018-11-28 NOTE — ED NOTES
Pt states ate [employer] bar and felt as if choking became frighten and began having anxiety attack. Pt states sob and racing heart. Continues to feel nervous. Denies sob and this time.       Barbara Deras RN  02/22/17 [id number]     medication patch(es) used

## 2018-11-28 NOTE — TELEPHONE ENCOUNTER
-Per Marti LEES, Ms. Lopez has been called with her recent lab results & recommendations.  Continue her current medications and follow-up as planned or sooner if any problems.      ---- Message from YOANA Arguello sent at 11/27/2018  1:31 PM CST -----  Please call and let her know that her ERNIE was negative as well and her cholesterol panel was perfect.  Thank you.

## 2018-11-28 NOTE — PROGRESS NOTES
Per Marti LEES, Ms. Lopez has been called with her recent lab results & recommendations.  Continue her current medications and follow-up as planned or sooner if any problems.

## 2018-11-29 DIAGNOSIS — I73.9 CLAUDICATION (HCC): Primary | ICD-10-CM

## 2018-11-30 ENCOUNTER — TELEPHONE (OUTPATIENT)
Dept: FAMILY MEDICINE CLINIC | Facility: CLINIC | Age: 53
End: 2018-11-30

## 2018-11-30 LAB
BH CV LOWER ARTERIAL LEFT ABI RATIO: 1.22
BH CV LOWER ARTERIAL LEFT DORSALIS PEDIS SYS MAX: 117 MMHG
BH CV LOWER ARTERIAL LEFT POST TIBIAL SYS MAX: 122 MMHG
BH CV LOWER ARTERIAL RIGHT ABI RATIO: 1.18
BH CV LOWER ARTERIAL RIGHT DORSALIS PEDIS SYS MAX: 112 MMHG
BH CV LOWER ARTERIAL RIGHT POST TIBIAL SYS MAX: 118 MMHG
UPPER ARTERIAL LEFT ARM BRACHIAL SYS MAX: 91 MMHG
UPPER ARTERIAL RIGHT ARM BRACHIAL SYS MAX: 100 MMHG

## 2018-11-30 RX ORDER — LEVOTHYROXINE SODIUM 0.03 MG/1
25 TABLET ORAL DAILY
Qty: 30 TABLET | Refills: 11 | Status: SHIPPED | OUTPATIENT
Start: 2018-11-30 | End: 2019-03-26 | Stop reason: SDUPTHER

## 2018-11-30 NOTE — TELEPHONE ENCOUNTER
Per Marti LEES, Ms. Lopez has been called with her recent Venous Doppler results & recommendations.  Continue her current medications and follow-up as planned or sooner if any problems.      ----- Message from YOANA Arguello sent at 11/30/2018  9:45 AM CST -----  Please call and let her know that her ABIs showed normal pulses in the ultrasound of her lower extremity was good with the exception of limited flow able to be visualized on the left lower leg.  This does not require any vascular intervention but I can refer her to [facility] and restoration clinic out of [place] if she would like further evaluation.  She needs to wear compression stockings daily and elevate her lower extremities at night and up on pillows when sleeping.  Thank you.

## 2018-12-10 ENCOUNTER — LAB (OUTPATIENT)
Dept: LAB | Facility: HOSPITAL | Age: 53
End: 2018-12-10

## 2018-12-10 DIAGNOSIS — E89.0 POSTABLATIVE HYPOTHYROIDISM: ICD-10-CM

## 2018-12-10 LAB
T4 FREE SERPL-MCNC: 0.94 NG/DL (ref 0.78–2.19)
TSH SERPL DL<=0.05 MIU/L-ACNC: 0.47 MIU/ML (ref 0.46–4.68)

## 2018-12-10 PROCEDURE — 36415 COLL VENOUS BLD VENIPUNCTURE: CPT

## 2018-12-10 PROCEDURE — 84439 ASSAY OF FREE THYROXINE: CPT

## 2018-12-10 PROCEDURE — 84443 ASSAY THYROID STIM HORMONE: CPT

## 2019-01-09 RX ORDER — OMEPRAZOLE 20 MG/1
20 CAPSULE, DELAYED RELEASE ORAL DAILY
Qty: 90 CAPSULE | Refills: 3 | Status: SHIPPED | OUTPATIENT
Start: 2019-01-09 | End: 2019-03-06 | Stop reason: SDUPTHER

## 2019-01-14 RX ORDER — TIZANIDINE 4 MG/1
TABLET ORAL
Qty: 120 TABLET | Refills: 0 | Status: SHIPPED | OUTPATIENT
Start: 2019-01-14 | End: 2019-02-12 | Stop reason: SDUPTHER

## 2019-01-28 ENCOUNTER — LAB (OUTPATIENT)
Dept: LAB | Facility: HOSPITAL | Age: 54
End: 2019-01-28

## 2019-01-28 DIAGNOSIS — E89.0 POSTABLATIVE HYPOTHYROIDISM: ICD-10-CM

## 2019-01-28 LAB
T4 FREE SERPL-MCNC: 1.06 NG/DL (ref 0.78–2.19)
TSH SERPL DL<=0.05 MIU/L-ACNC: 0.05 MIU/ML (ref 0.46–4.68)

## 2019-01-28 PROCEDURE — 36415 COLL VENOUS BLD VENIPUNCTURE: CPT

## 2019-01-28 PROCEDURE — 84439 ASSAY OF FREE THYROXINE: CPT

## 2019-01-28 PROCEDURE — 84443 ASSAY THYROID STIM HORMONE: CPT

## 2019-01-29 ENCOUNTER — TELEPHONE (OUTPATIENT)
Dept: ENDOCRINOLOGY | Facility: CLINIC | Age: 54
End: 2019-01-29

## 2019-02-04 RX ORDER — ESCITALOPRAM OXALATE 10 MG/1
10 TABLET ORAL DAILY
Qty: 90 TABLET | Refills: 1 | Status: SHIPPED | OUTPATIENT
Start: 2019-02-04 | End: 2019-03-06 | Stop reason: SDUPTHER

## 2019-02-12 RX ORDER — TIZANIDINE 4 MG/1
TABLET ORAL
Qty: 120 TABLET | Refills: 0 | Status: SHIPPED | OUTPATIENT
Start: 2019-02-12 | End: 2019-03-06 | Stop reason: SDUPTHER

## 2019-02-25 DIAGNOSIS — M54.6 LEFT-SIDED THORACIC BACK PAIN, UNSPECIFIED CHRONICITY: ICD-10-CM

## 2019-02-25 RX ORDER — NAPROXEN 375 MG/1
375 TABLET ORAL 2 TIMES DAILY WITH MEALS
Qty: 60 TABLET | Refills: 3 | Status: SHIPPED | OUTPATIENT
Start: 2019-02-25 | End: 2019-07-30 | Stop reason: ALTCHOICE

## 2019-02-27 ENCOUNTER — LAB (OUTPATIENT)
Dept: LAB | Facility: HOSPITAL | Age: 54
End: 2019-02-27

## 2019-02-27 DIAGNOSIS — E89.0 POSTABLATIVE HYPOTHYROIDISM: ICD-10-CM

## 2019-02-27 LAB
T4 FREE SERPL-MCNC: 0.61 NG/DL (ref 0.78–2.19)
TSH SERPL DL<=0.05 MIU/L-ACNC: 1.81 MIU/ML (ref 0.46–4.68)

## 2019-02-27 PROCEDURE — 36415 COLL VENOUS BLD VENIPUNCTURE: CPT

## 2019-02-27 PROCEDURE — 84439 ASSAY OF FREE THYROXINE: CPT

## 2019-02-27 PROCEDURE — 84443 ASSAY THYROID STIM HORMONE: CPT

## 2019-03-06 ENCOUNTER — OFFICE VISIT (OUTPATIENT)
Dept: FAMILY MEDICINE CLINIC | Facility: CLINIC | Age: 54
End: 2019-03-06

## 2019-03-06 VITALS
BODY MASS INDEX: 26.16 KG/M2 | WEIGHT: 157 LBS | HEIGHT: 65 IN | SYSTOLIC BLOOD PRESSURE: 100 MMHG | DIASTOLIC BLOOD PRESSURE: 72 MMHG

## 2019-03-06 DIAGNOSIS — F41.1 GENERALIZED ANXIETY DISORDER: Primary | ICD-10-CM

## 2019-03-06 DIAGNOSIS — M54.50 LUMBAR BACK PAIN: ICD-10-CM

## 2019-03-06 DIAGNOSIS — E05.00 GRAVES' DISEASE: ICD-10-CM

## 2019-03-06 DIAGNOSIS — K21.9 GASTROESOPHAGEAL REFLUX DISEASE WITHOUT ESOPHAGITIS: ICD-10-CM

## 2019-03-06 PROCEDURE — 99213 OFFICE O/P EST LOW 20 MIN: CPT | Performed by: NURSE PRACTITIONER

## 2019-03-06 RX ORDER — ESCITALOPRAM OXALATE 10 MG/1
10 TABLET ORAL DAILY
Qty: 90 TABLET | Refills: 1 | Status: SHIPPED | OUTPATIENT
Start: 2019-03-06 | End: 2019-06-11 | Stop reason: DRUGHIGH

## 2019-03-06 RX ORDER — OMEPRAZOLE 20 MG/1
20 CAPSULE, DELAYED RELEASE ORAL DAILY
Qty: 90 CAPSULE | Refills: 1 | Status: SHIPPED | OUTPATIENT
Start: 2019-03-06 | End: 2019-11-24 | Stop reason: SDUPTHER

## 2019-03-06 RX ORDER — TIZANIDINE 4 MG/1
4 TABLET ORAL EVERY 6 HOURS PRN
Qty: 120 TABLET | Refills: 1 | Status: SHIPPED | OUTPATIENT
Start: 2019-03-06 | End: 2019-04-13 | Stop reason: SDUPTHER

## 2019-03-12 RX ORDER — TIZANIDINE 4 MG/1
TABLET ORAL
Qty: 120 TABLET | Refills: 0 | Status: SHIPPED | OUTPATIENT
Start: 2019-03-12 | End: 2019-04-15 | Stop reason: SDUPTHER

## 2019-03-14 ENCOUNTER — APPOINTMENT (OUTPATIENT)
Dept: ONCOLOGY | Facility: HOSPITAL | Age: 54
End: 2019-03-14

## 2019-03-18 ENCOUNTER — TREATMENT (OUTPATIENT)
Dept: ENDOCRINOLOGY | Facility: CLINIC | Age: 54
End: 2019-03-18

## 2019-03-18 ENCOUNTER — LAB (OUTPATIENT)
Dept: LAB | Facility: HOSPITAL | Age: 54
End: 2019-03-18

## 2019-03-18 ENCOUNTER — INFUSION (OUTPATIENT)
Dept: ONCOLOGY | Facility: HOSPITAL | Age: 54
End: 2019-03-18

## 2019-03-18 DIAGNOSIS — M81.0 AGE-RELATED OSTEOPOROSIS WITHOUT CURRENT PATHOLOGICAL FRACTURE: ICD-10-CM

## 2019-03-18 DIAGNOSIS — M81.0 OSTEOPOROSIS, UNSPECIFIED OSTEOPOROSIS TYPE, UNSPECIFIED PATHOLOGICAL FRACTURE PRESENCE: Primary | ICD-10-CM

## 2019-03-18 DIAGNOSIS — E89.0 POSTABLATIVE HYPOTHYROIDISM: ICD-10-CM

## 2019-03-18 LAB
CALCIUM SPEC-SCNC: 9.7 MG/DL (ref 8.4–10.2)
MAGNESIUM SERPL-MCNC: 2.2 MG/DL (ref 1.6–2.3)
PHOSPHATE SERPL-MCNC: 4.5 MG/DL (ref 2.4–4.4)
T4 FREE SERPL-MCNC: 0.63 NG/DL (ref 0.78–2.19)
TSH SERPL DL<=0.05 MIU/L-ACNC: 2.48 MIU/ML (ref 0.46–4.68)

## 2019-03-18 PROCEDURE — 84439 ASSAY OF FREE THYROXINE: CPT

## 2019-03-18 PROCEDURE — 82310 ASSAY OF CALCIUM: CPT

## 2019-03-18 PROCEDURE — 84100 ASSAY OF PHOSPHORUS: CPT

## 2019-03-18 PROCEDURE — 96372 THER/PROPH/DIAG INJ SC/IM: CPT | Performed by: NURSE PRACTITIONER

## 2019-03-18 PROCEDURE — 36415 COLL VENOUS BLD VENIPUNCTURE: CPT

## 2019-03-18 PROCEDURE — 84443 ASSAY THYROID STIM HORMONE: CPT

## 2019-03-18 PROCEDURE — 25010000002 DENOSUMAB 60 MG/ML SOLUTION: Performed by: INTERNAL MEDICINE

## 2019-03-18 PROCEDURE — 83735 ASSAY OF MAGNESIUM: CPT

## 2019-03-18 RX ADMIN — DENOSUMAB 60 MG: 60 INJECTION SUBCUTANEOUS at 14:37

## 2019-03-26 ENCOUNTER — OFFICE VISIT (OUTPATIENT)
Dept: ENDOCRINOLOGY | Facility: CLINIC | Age: 54
End: 2019-03-26

## 2019-03-26 VITALS
WEIGHT: 155.4 LBS | BODY MASS INDEX: 25.89 KG/M2 | OXYGEN SATURATION: 98 % | DIASTOLIC BLOOD PRESSURE: 76 MMHG | HEIGHT: 65 IN | SYSTOLIC BLOOD PRESSURE: 100 MMHG | HEART RATE: 79 BPM

## 2019-03-26 DIAGNOSIS — N95.1 MENOPAUSAL SYNDROME (HOT FLASHES): ICD-10-CM

## 2019-03-26 DIAGNOSIS — M81.0 AGE-RELATED OSTEOPOROSIS WITHOUT CURRENT PATHOLOGICAL FRACTURE: ICD-10-CM

## 2019-03-26 DIAGNOSIS — E05.00 GRAVES' DISEASE: Primary | ICD-10-CM

## 2019-03-26 DIAGNOSIS — E89.0 POSTABLATIVE HYPOTHYROIDISM: ICD-10-CM

## 2019-03-26 DIAGNOSIS — E03.8 CENTRAL HYPOTHYROIDISM: ICD-10-CM

## 2019-03-26 DIAGNOSIS — M81.0 OSTEOPOROSIS, UNSPECIFIED OSTEOPOROSIS TYPE, UNSPECIFIED PATHOLOGICAL FRACTURE PRESENCE: ICD-10-CM

## 2019-03-26 DIAGNOSIS — E55.9 VITAMIN D DEFICIENCY: ICD-10-CM

## 2019-03-26 DIAGNOSIS — E05.90 HYPERTHYROIDISM: ICD-10-CM

## 2019-03-26 PROCEDURE — 99214 OFFICE O/P EST MOD 30 MIN: CPT | Performed by: INTERNAL MEDICINE

## 2019-03-26 RX ORDER — LEVOTHYROXINE SODIUM 0.03 MG/1
25 TABLET ORAL DAILY
Qty: 30 TABLET | Refills: 11 | Status: SHIPPED | OUTPATIENT
Start: 2019-03-26 | End: 2020-01-03

## 2019-03-26 NOTE — PROGRESS NOTES
Elizabeth Lopez is a 53 y.o. female. she is here for follow up     Patient has stopped taking the multivitamin and is currently not taking her synthroid for several weeks, reports fatigue, heart palpiations.      Hyperthyroidism   Associated symptoms include fatigue. Pertinent negatives include no abdominal pain, arthralgias, chest pain, coughing, diaphoresis, headaches, joint swelling, myalgias, nausea, neck pain, numbness, rash, sore throat, vomiting or weakness.   Thyroid Problem   Symptoms include fatigue and palpitations. Patient reports no cold intolerance, constipation, diaphoresis, diarrhea, heat intolerance or tremors.          Primary Care/Referring Provider  Raiza Saini MD   follow up      reason - subclinical hyperthyroidism and osteoporosis     from 2-15 neg tsi and tpo ab and toxic subcm MNG , most likely still Graves'     She had MADERA ablation Sept 7, 2018     Pattern suggests central hypothyroidism       Thyroid US      bilateral sucm complex thyroid cysts  maximal dimension 5 mm      US dimensions - within normal limtis     personally reviewed from July 201 4 and stable when compared to July 2016     ==============     symptoms - anxiety       Component      Latest Ref Rng & Units 7/24/2018 10/4/2018 11/20/2018   TSH Baseline      0.460 - 4.680 mIU/mL 0.230 (L) 0.040 (L) 0.190 (L)   Free T4      0.78 - 2.19 ng/dL 0.90 1.19 0.74 (L)   T3, Free      2.0 - 4.4 pg/mL  4.0 3.1   Thyroid Stimulating Immunoglobulin      0.00 - 0.55 IU/L   <0.10               Evaluation history:  TSH   Date Value Ref Range Status   03/18/2019 2.480 0.460 - 4.680 mIU/mL Final     Free T4   Date Value Ref Range Status   03/18/2019 0.63 (L) 0.78 - 2.19 ng/dL Final     T3, Free   Date Value Ref Range Status   11/20/2018 3.1 2.0 - 4.4 pg/mL Final       Current medications:  Current Outpatient Medications   Medication Sig Dispense Refill   • calcium carbonate 1250 MG capsule Take 600 mg by mouth.     •  escitalopram (LEXAPRO) 10 MG tablet Take 1 tablet by mouth Daily. 90 tablet 1   • GLUCOSAMINE-CHONDROITIN PO Take  by mouth.     • HYDROcodone-acetaminophen (NORCO) 5-325 MG per tablet Take 1 tablet by mouth 2 (Two) Times a Day As Needed for Moderate Pain . (Patient taking differently: Take 1 tablet by mouth Every 6 (Six) Hours As Needed for Moderate Pain .) 30 tablet 0   • levothyroxine (SYNTHROID) 25 MCG tablet Take 1 tablet by mouth Daily. 30 tablet 11   • LORazepam (ATIVAN) 0.5 MG tablet Take 1 tablet by mouth Daily As Needed for Anxiety. 30 tablet 0   • magnesium oxide (MAGOX) 400 (241.3 Mg) MG tablet tablet Take 800 mg by mouth Daily.     • metoprolol succinate XL (TOPROL XL) 25 MG 24 hr tablet Take 12.5 mg by mouth daily.     • Multiple Vitamins-Minerals (MULTIVITAMIN PO) Take 1 tablet by mouth daily.     • naproxen (NAPROSYN) 375 MG tablet Take 1 tablet by mouth 2 (Two) Times a Day With Meals. 60 tablet 3   • Omega-3 Fatty Acids (FISH OIL CONCENTRATE) 1000 MG capsule Take 1 capsule by mouth 2 (two) times a day.     • omeprazole (priLOSEC) 20 MG capsule Take 1 capsule by mouth Daily. 90 capsule 1   • tiZANidine (ZANAFLEX) 4 MG tablet Take 1 tablet by mouth Every 6 (Six) Hours As Needed for Muscle Spasms. 120 tablet 1   • tiZANidine (ZANAFLEX) 4 MG tablet TAKE 1 TABLET BY MOUTH EVERY 6 HOURS AS NEEDED FOR MUSCLE SPASMS 120 tablet 0   • vitamin E 400 UNIT capsule Take 400 Units by mouth daily.       No current facility-administered medications for this visit.        The following portions of the patient's history were reviewed and updated as appropriate:   Past Medical History:   Diagnosis Date   • Anal fistula    • Anemia    • Astigmatism    • Breast cyst    • Broken heart syndrome 03/02/2016    when mother passed away   • Chronic pain disorder    • Death of family member     Mother passes away.   • Generalized anxiety disorder    • GERD (gastroesophageal reflux disease)    • Goiter    • Graves disease    •  Heartburn    • Hematuria    • Hyperthyroidism    • Lesion of eyelid     LLL   • Low back pain    • Lumbosacral spondylosis    • Menopausal syndrome    • Myopia    • Osteoporosis    • Pain in back     Lumbar region   • Pain in joint involving left lower leg    • Pain in wrist    • Panic attack    • Presbyopia    • Right upper quadrant pain    • Takotsubo cardiomyopathy    • Thoracic spondylosis      Past Surgical History:   Procedure Laterality Date   • BREAST CYST EXCISION     • CARDIAC CATHETERIZATION  2016    Normal coronaries with no obstructive disease. Nonischemic stress induced cardiomyopathy known as Takotsubo syndrome.   • COLONOSCOPY  2016    Removal of anal fistula   • CYST REMOVAL Right     right breast   • EYELID CARCINOMA EXCISION  06/15/2015   • HYSTERECTOMY  2014    Total abdominal hysterectomy, bilateral salpingo-oophorectomy. Menorrhagia and leiomyomatous uterus.   • SKIN BIOPSY  2014   • TUBAL ABDOMINAL LIGATION     • WOUND CLOSURE  2014    Layer closure of wound.   • WOUND CLOSURE  2014    Layer closure of wound     Family History   Problem Relation Age of Onset   • Heart disease Mother    • Hyperlipidemia Mother    • Hypertension Mother    • Osteoporosis Mother    • Cancer Mother    • Cancer Father    • Diabetes Sister    • Thyroid disease Sister    • COPD Sister    • Hypertension Sister    • Migraines Sister    • Diabetes Brother    • COPD Brother    • Cancer Maternal Grandmother         Ovarian Cancer   • Breast cancer Other    • Cancer Other    • Other Other         Gall Bladder disease   • Breast cancer Other    • Breast cancer Maternal Aunt      OB History      Para Term  AB Living    3 3 3          SAB TAB Ectopic Molar Multiple Live Births                       No Known Allergies  Social History     Socioeconomic History   • Marital status:      Spouse name: Not on file   • Number of children: Not on file   • Years of education: Not  "on file   • Highest education level: Not on file   Tobacco Use   • Smoking status: Never Smoker   • Smokeless tobacco: Never Used   Substance and Sexual Activity   • Alcohol use: No   • Drug use: No   • Sexual activity: Defer       Review of Systems  Review of Systems   Constitutional: Positive for fatigue and unexpected weight change. Negative for activity change, appetite change and diaphoresis.   HENT: Negative for facial swelling, sneezing, sore throat, tinnitus, trouble swallowing and voice change.    Eyes: Negative for photophobia, pain, discharge, redness, itching and visual disturbance.   Respiratory: Negative for apnea, cough, choking, chest tightness and shortness of breath.    Cardiovascular: Positive for palpitations. Negative for chest pain and leg swelling.   Gastrointestinal: Negative for abdominal distention, abdominal pain, constipation, diarrhea, nausea and vomiting.   Endocrine: Negative for cold intolerance, heat intolerance, polydipsia, polyphagia and polyuria.   Genitourinary: Negative for difficulty urinating, dysuria, frequency, hematuria and urgency.   Musculoskeletal: Negative for arthralgias, back pain, gait problem, joint swelling, myalgias, neck pain and neck stiffness.   Skin: Negative for color change, pallor, rash and wound.   Neurological: Negative for dizziness, tremors, weakness, light-headedness, numbness and headaches.   Hematological: Negative for adenopathy. Does not bruise/bleed easily.   Psychiatric/Behavioral: Negative for behavioral problems, confusion and sleep disturbance.        Objective    /76   Pulse 79   Ht 165.1 cm (65\")   Wt 70.5 kg (155 lb 6.4 oz)   LMP  (LMP Unknown)   SpO2 98%   BMI 25.86 kg/m²   Physical Exam   Constitutional: She is oriented to person, place, and time. She appears well-developed and well-nourished. No distress.   HENT:   Head: Normocephalic and atraumatic.   Right Ear: External ear normal.   Left Ear: External ear normal.   Nose: " Nose normal.   Eyes: Conjunctivae and EOM are normal. Pupils are equal, round, and reactive to light.   Neck: Normal range of motion. Neck supple. No tracheal deviation present. No thyromegaly present.   Cardiovascular: Normal rate, regular rhythm and normal heart sounds.   No murmur heard.  Pulmonary/Chest: Effort normal and breath sounds normal. No respiratory distress. She has no wheezes.   Abdominal: Soft. Bowel sounds are normal. There is no tenderness. There is no rebound and no guarding.   Musculoskeletal: Normal range of motion. She exhibits no edema, tenderness or deformity.   Neurological: She is alert and oriented to person, place, and time. No cranial nerve deficit.   Skin: Skin is warm and dry. No rash noted.   Psychiatric: She has a normal mood and affect. Her behavior is normal. Judgment and thought content normal.       Lab Review  Lab Results   Component Value Date    TSH 2.480 03/18/2019     Lab Results   Component Value Date    FREET4 0.63 (L) 03/18/2019        Assessment/Plan      1. Graves' disease    2. Postablative hypothyroidism    3. Osteoporosis, unspecified osteoporosis type, unspecified pathological fracture presence    4. Vitamin D deficiency    5. Hyperthyroidism    6. Age-related osteoporosis without current pathological fracture    7. Menopausal syndrome (hot flashes)    8. Central hypothyroidism    . This diagnosis was discussed and reviewed with the patient including the advantages of drug therapy.     1. Orders placed during this encounter include:  Orders Placed This Encounter   Procedures   • TSH   • T4, Free   • Free T4 By Dialysis / Mass Spec   • Human Anti-mouse Antibodies   • Insulin-like Growth Factor   • Prolactin   • FSH & LH       Medications prescribed:  Outpatient Encounter Medications as of 3/26/2019   Medication Sig Dispense Refill   • calcium carbonate 1250 MG capsule Take 600 mg by mouth.     • escitalopram (LEXAPRO) 10 MG tablet Take 1 tablet by mouth Daily. 90  tablet 1   • GLUCOSAMINE-CHONDROITIN PO Take  by mouth.     • HYDROcodone-acetaminophen (NORCO) 5-325 MG per tablet Take 1 tablet by mouth 2 (Two) Times a Day As Needed for Moderate Pain . (Patient taking differently: Take 1 tablet by mouth Every 6 (Six) Hours As Needed for Moderate Pain .) 30 tablet 0   • levothyroxine (SYNTHROID) 25 MCG tablet Take 1 tablet by mouth Daily. 30 tablet 11   • LORazepam (ATIVAN) 0.5 MG tablet Take 1 tablet by mouth Daily As Needed for Anxiety. 30 tablet 0   • magnesium oxide (MAGOX) 400 (241.3 Mg) MG tablet tablet Take 800 mg by mouth Daily.     • metoprolol succinate XL (TOPROL XL) 25 MG 24 hr tablet Take 12.5 mg by mouth daily.     • Multiple Vitamins-Minerals (MULTIVITAMIN PO) Take 1 tablet by mouth daily.     • naproxen (NAPROSYN) 375 MG tablet Take 1 tablet by mouth 2 (Two) Times a Day With Meals. 60 tablet 3   • Omega-3 Fatty Acids (FISH OIL CONCENTRATE) 1000 MG capsule Take 1 capsule by mouth 2 (two) times a day.     • omeprazole (priLOSEC) 20 MG capsule Take 1 capsule by mouth Daily. 90 capsule 1   • tiZANidine (ZANAFLEX) 4 MG tablet Take 1 tablet by mouth Every 6 (Six) Hours As Needed for Muscle Spasms. 120 tablet 1   • tiZANidine (ZANAFLEX) 4 MG tablet TAKE 1 TABLET BY MOUTH EVERY 6 HOURS AS NEEDED FOR MUSCLE SPASMS 120 tablet 0   • vitamin E 400 UNIT capsule Take 400 Units by mouth daily.     • [DISCONTINUED] levothyroxine (SYNTHROID) 25 MCG tablet Take 1 tablet by mouth Daily. 30 tablet 11     No facility-administered encounter medications on file as of 3/26/2019.      Hyperthyroidism             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 documented as back as 2014 and personally reviewed   Report from 2016 , compared to 2014 no change       She had MADERA ablation on Sept 7, 2018     Low Free T4, nl TSH x 2 , retry levothyroxine 25 mcgs     Measure other pituitary hormones along with TSH off biotin in 3 weeks and I will call      In the absence of central process , treat when TSH rises            Osteoporosis     March 2016     Total Lumbar spine- 0.692 gm/cm2 T-Score -3.2      4 2017, repeat shows improvement on forteo   Next April 2019        Completed 2 years of forteo , started 27 of June 2016     Now on prolia       We were doing 50 th weekly but now   calcium + D once daily       Lab Results   Component Value Date    LBOC71AK 57.3 11/20/2018    AKEX25UI 60.2 10/04/2018    YHOJ06PC 54.4 07/24/2018          PTH and calcium- nl    We discussed about risk of ONJ and atypical femur fractures      Menopausal syndrome (hot flashes)  I started estrogen patches but allergy     h o  Taktsubo, I prefer no estrogen      Doug started metoprolol             Lab Results   Component Value Date    CHOL 191 11/20/2018    CHLPL 171 03/31/2016    TRIG 68 11/20/2018    HDL 72 11/20/2018    LDL 95 11/20/2018     Off statins        4. Return in about 3 months (around 6/26/2019).

## 2019-04-13 DIAGNOSIS — M54.50 LUMBAR BACK PAIN: ICD-10-CM

## 2019-04-15 RX ORDER — TIZANIDINE 4 MG/1
TABLET ORAL
Qty: 120 TABLET | Refills: 0 | OUTPATIENT
Start: 2019-04-15 | End: 2020-07-14

## 2019-04-24 ENCOUNTER — APPOINTMENT (OUTPATIENT)
Dept: LAB | Facility: HOSPITAL | Age: 54
End: 2019-04-24

## 2019-04-24 LAB
FSH SERPL-ACNC: 110 MIU/ML
LH SERPL-ACNC: 50.9 MIU/ML
PROLACTIN SERPL-MCNC: 6.76 NG/ML (ref 4.79–23.3)
T4 FREE SERPL-MCNC: 0.93 NG/DL (ref 0.93–1.7)
TSH SERPL DL<=0.05 MIU/L-ACNC: 2.23 MIU/ML (ref 0.27–4.2)

## 2019-04-24 PROCEDURE — 84439 ASSAY OF FREE THYROXINE: CPT | Performed by: INTERNAL MEDICINE

## 2019-04-24 PROCEDURE — 84146 ASSAY OF PROLACTIN: CPT | Performed by: INTERNAL MEDICINE

## 2019-04-24 PROCEDURE — 83001 ASSAY OF GONADOTROPIN (FSH): CPT | Performed by: INTERNAL MEDICINE

## 2019-04-24 PROCEDURE — 36415 COLL VENOUS BLD VENIPUNCTURE: CPT | Performed by: INTERNAL MEDICINE

## 2019-04-24 PROCEDURE — 84305 ASSAY OF SOMATOMEDIN: CPT | Performed by: INTERNAL MEDICINE

## 2019-04-24 PROCEDURE — 84443 ASSAY THYROID STIM HORMONE: CPT | Performed by: INTERNAL MEDICINE

## 2019-04-24 PROCEDURE — 83002 ASSAY OF GONADOTROPIN (LH): CPT | Performed by: INTERNAL MEDICINE

## 2019-04-24 PROCEDURE — 83520 IMMUNOASSAY QUANT NOS NONAB: CPT | Performed by: INTERNAL MEDICINE

## 2019-04-26 LAB
HUMAN ANTIMOUSE AB SER-MCNC: <56 NG/ML (ref 0–74)
IGF-I SERPL-MCNC: 123 NG/ML (ref 53–190)

## 2019-04-30 LAB — T4 FREE SERPL DIALY-MCNC: 0.79 NG/DL

## 2019-05-14 RX ORDER — TIZANIDINE 4 MG/1
TABLET ORAL
Qty: 120 TABLET | Refills: 0 | Status: SHIPPED | OUTPATIENT
Start: 2019-05-14 | End: 2019-06-11 | Stop reason: SDUPTHER

## 2019-05-17 NOTE — PROGRESS NOTES
Elizabeth Lopez is a 52 y.o. female. she is here for follow up     History of Present Illness       Primary Care/Referring Provider  Raiza Saini MD   follow up      reason - subclinical hyperthyroidism and osteoporosis     from 2-15 neg tsi and tpo ab and toxic subcm MNG , most likely still Graves'     She had MADERA ablation Sept 7, 2018   ==============     duration - Jan 2014     ==============     quantity     TSH mild suppression   between 0.1 to 0.4      Now nl      ==============     timing      constant        ==============     location -   Thyroid US was done      bilateral sucm complex thyroid cysts  maximal dimension 5 mm      US dimensions - within normal limtis     personally reviewed from July 201 4 and stable when compared to July 2016     ==============     symptoms - wake up tired , hot flashes, weight loss improved      ======================     DXA from March 2016     total lumbar spine t score is negative 3.2     femoral neck right hip t score is negative 2.2     patient has severe osteoporosis     Repeat 4-17 shows 7% improvement at spine     DATE OF PROCEDURE:  4/13/2017 10:17 AM CDT     BONE MINERAL DENSITOMETRY (DXA)     INDICATION FOR PROCEDURE:  Bone density screening     COMPARISON: March 16, 2016.     FINDINGS:     L1-L4 bone mineral density (BMD):      0.743 grams per square centimeter     2.8 standard deviations (T score) below the mean compared to a  young normal.      T score is -2.8. This represents a 7.4% increase in bone  density since the previous study when bone density is 0.692 g/sq  cm.     Mean of bilateral femoral necks, bone mineral density:              0.609 grams per square centimeter    2.2 standard deviations (T score) below the mean compared to a  young normal.      T score is -2.2.     IMPRESSION:  CONCLUSION:     1.  Bone mineral density measurements as above.  2.  Lumbar spine:  Osteoporosis.   3.  Femoral necks:  Osteopenia.            -------------------     methimazole was started now at 5 mg          Evaluation history:  TSH   Date Value Ref Range Status   10/04/2018 0.040 (L) 0.460 - 4.680 mIU/mL Final     Free T4   Date Value Ref Range Status   10/04/2018 1.19 0.78 - 2.19 ng/dL Final     T3, Free   Date Value Ref Range Status   10/04/2018 4.0 2.0 - 4.4 pg/mL Final       Current medications:  Current Outpatient Prescriptions   Medication Sig Dispense Refill   • aspirin 81 MG chewable tablet Chew 81 mg daily.     • B-D UF III MINI PEN NEEDLES 31G X 5 MM misc USE AS DIRECTED 50 each 2   • Biotin 1000 MCG tablet Take 1,000 mcg by mouth Daily.     • calcium carbonate 1250 MG capsule Take 600 mg by mouth.     • clotrimazole (LOTRIMIN) 1 % cream Apply  topically 2 (Two) Times a Day. Apply BID until rash on hand disappears and apply additional 2 days. 45 g 0   • escitalopram (LEXAPRO) 10 MG tablet Take 1 tablet by mouth Daily. 90 tablet 1   • fluconazole (DIFLUCAN) 200 MG tablet Take 1 tablet by mouth Daily. 2 tablet 0   • fluticasone (FLONASE) 50 MCG/ACT nasal spray 2 sprays into each nostril Daily.     • HYDROcodone-acetaminophen (NORCO) 5-325 MG per tablet Take 1 tablet by mouth 2 (Two) Times a Day As Needed for Moderate Pain . 30 tablet 0   • lidocaine (XYLOCAINE) 5 % ointment Apply  topically Every 2 (Two) Hours As Needed for Mild Pain (1-3). 50 g 1   • LORazepam (ATIVAN) 0.5 MG tablet Take 1 tablet by mouth Daily As Needed for Anxiety. 30 tablet 0   • magnesium oxide (MAGOX) 400 (241.3 Mg) MG tablet tablet Take 800 mg by mouth Daily.     • methIMAzole (TAPAZOLE) 5 MG tablet Alternate 2 tabs one day with 1 tab the other day. 45 tablet 11   • metoprolol succinate XL (TOPROL XL) 25 MG 24 hr tablet Take 12.5 mg by mouth daily.     • Multiple Vitamins-Minerals (MULTIVITAMIN PO) Take 1 tablet by mouth daily.     • naproxen (NAPROSYN) 375 MG tablet Take 1 tablet by mouth 2 (Two) Times a Day With Meals. 60 tablet 3   • naproxen (NAPROSYN) 375 MG  tablet TAKE 1 TABLET BY MOUTH TWICE DAILY AS NEEDED FOR MILD PAIN.(1-3 ON PAIN SCALE) 30 tablet 0   • nystatin (MYCOSTATIN) 865973 UNIT/GM ointment Apply  topically 2 (Two) Times a Day. Apply 2 times a day until rash disappears and continue to apply ointment to area for 3 more days. 30 g 0   • Omega-3 Fatty Acids (FISH OIL CONCENTRATE) 1000 MG capsule Take 1 capsule by mouth 2 (two) times a day.     • omeprazole (priLOSEC) 20 MG capsule Take 1 capsule by mouth Daily. 90 capsule 1   • omeprazole (priLOSEC) 20 MG capsule TAKE 1 CAPSULE BY MOUTH DAILY 90 capsule 0   • phenazopyridine (PYRIDIUM) 100 MG tablet Take 1 tablet by mouth 3 (Three) Times a Day As Needed for bladder spasms. 15 tablet 0   • tiZANidine (ZANAFLEX) 4 MG tablet TAKE 1 TABLET BY MOUTH EVERY 6 HOURS AS NEEDED FOR MUSCLE SPASMS 120 tablet 0   • vitamin E 400 UNIT capsule Take 400 Units by mouth daily.       No current facility-administered medications for this visit.        The following portions of the patient's history were reviewed and updated as appropriate:   Past Medical History:   Diagnosis Date   • Anal fistula    • Anemia    • Astigmatism    • Broken heart syndrome 03/02/2016    when mother passed away   • Chronic pain disorder    • Death of family member     Mother passes away.   • Generalized anxiety disorder    • GERD (gastroesophageal reflux disease)    • Goiter    • Graves disease    • Heartburn    • Hematuria    • Hyperthyroidism    • Lesion of eyelid     LLL   • Low back pain    • Lumbosacral spondylosis    • Menopausal syndrome    • Myopia    • Osteoporosis    • Pain in back     Lumbar region   • Pain in joint involving left lower leg    • Pain in wrist    • Panic attack    • Presbyopia    • Right upper quadrant pain    • Takotsubo cardiomyopathy    • Thoracic spondylosis      Past Surgical History:   Procedure Laterality Date   • CARDIAC CATHETERIZATION  03/02/2016    Normal coronaries with no obstructive disease. Nonischemic stress  induced cardiomyopathy known as Takotsubo syndrome.   • COLONOSCOPY  2016    Removal of anal fistula   • CYST REMOVAL Right     right breast   • EYELID CARCINOMA EXCISION  06/15/2015   • HYSTERECTOMY  2014    Total abdominal hysterectomy, bilateral salpingo-oophorectomy. Menorrhagia and leiomyomatous uterus.   • SKIN BIOPSY  2014   • TUBAL ABDOMINAL LIGATION     • WOUND CLOSURE  2014    Layer closure of wound.   • WOUND CLOSURE  2014    Layer closure of wound     Family History   Problem Relation Age of Onset   • Heart disease Mother    • Hyperlipidemia Mother    • Hypertension Mother    • Osteoporosis Mother    • Cancer Mother    • Cancer Father    • Diabetes Sister    • Thyroid disease Sister    • COPD Sister    • Hypertension Sister    • Migraines Sister    • Diabetes Brother    • COPD Brother    • Cancer Maternal Grandmother         Ovarian Cancer   • Breast cancer Other    • Cancer Other    • Other Other         Gall Bladder disease   • Breast cancer Other    • Breast cancer Maternal Aunt      OB History      Para Term  AB Living    3 3 3          SAB TAB Ectopic Molar Multiple Live Births                       No Known Allergies  Social History     Social History   • Marital status:      Social History Main Topics   • Smoking status: Never Smoker   • Smokeless tobacco: Never Used   • Alcohol use No   • Drug use: No   • Sexual activity: Defer     Other Topics Concern   • Not on file       Review of Systems  Review of Systems   Constitutional: Negative for activity change, appetite change, diaphoresis and fatigue.   HENT: Negative for facial swelling, sneezing, sore throat, tinnitus, trouble swallowing and voice change.    Eyes: Negative for photophobia, pain, discharge, redness, itching and visual disturbance.   Respiratory: Negative for apnea, cough, choking, chest tightness and shortness of breath.    Cardiovascular: Negative for chest pain, palpitations and  "leg swelling.   Gastrointestinal: Negative for abdominal distention, abdominal pain, constipation, diarrhea, nausea and vomiting.   Endocrine: Negative for cold intolerance, heat intolerance, polydipsia, polyphagia and polyuria.   Genitourinary: Negative for difficulty urinating, dysuria, frequency, hematuria and urgency.   Musculoskeletal: Negative for arthralgias, back pain, gait problem, joint swelling, myalgias, neck pain and neck stiffness.   Skin: Negative for color change, pallor, rash and wound.   Neurological: Negative for dizziness, tremors, weakness, light-headedness, numbness and headaches.   Hematological: Negative for adenopathy. Does not bruise/bleed easily.   Psychiatric/Behavioral: Negative for behavioral problems, confusion and sleep disturbance.        Objective    /80 (BP Location: Right arm, Patient Position: Sitting, Cuff Size: Adult)   Pulse 62   Ht 165.1 cm (65\")   Wt 66.2 kg (146 lb)   LMP  (LMP Unknown)   BMI 24.30 kg/m²   Physical Exam   Constitutional: She is oriented to person, place, and time. She appears well-developed and well-nourished. No distress.   HENT:   Head: Normocephalic and atraumatic.   Right Ear: External ear normal.   Left Ear: External ear normal.   Nose: Nose normal.   Eyes: Pupils are equal, round, and reactive to light. Conjunctivae and EOM are normal.   Neck: Normal range of motion. Neck supple. No tracheal deviation present. No thyromegaly present.   Cardiovascular: Normal rate, regular rhythm and normal heart sounds.    No murmur heard.  Pulmonary/Chest: Effort normal and breath sounds normal. No respiratory distress. She has no wheezes.   Abdominal: Soft. Bowel sounds are normal. There is no tenderness. There is no rebound and no guarding.   Musculoskeletal: Normal range of motion. She exhibits no edema, tenderness or deformity.   Neurological: She is alert and oriented to person, place, and time. No cranial nerve deficit.   Skin: Skin is warm and dry. " No rash noted.   Psychiatric: She has a normal mood and affect. Her behavior is normal. Judgment and thought content normal.       Lab Review  Lab Results   Component Value Date    TSH 0.040 (L) 10/04/2018     Lab Results   Component Value Date    FREET4 1.19 10/04/2018        Assessment/Plan      1. Graves' disease    2. Hyperthyroidism    3. Osteoporosis, unspecified osteoporosis type, unspecified pathological fracture presence    . This diagnosis was discussed and reviewed with the patient including the advantages of drug therapy.     1. Orders placed during this encounter include:  Orders Placed This Encounter   Procedures   • Comprehensive Metabolic Panel   • TSH   • T3, Free   • T4, Free   • CBC & Differential     Order Specific Question:   Manual Differential     Answer:   No       Medications prescribed:  Outpatient Encounter Prescriptions as of 10/8/2018   Medication Sig Dispense Refill   • aspirin 81 MG chewable tablet Chew 81 mg daily.     • B-D UF III MINI PEN NEEDLES 31G X 5 MM misc USE AS DIRECTED 50 each 2   • Biotin 1000 MCG tablet Take 1,000 mcg by mouth Daily.     • calcium carbonate 1250 MG capsule Take 600 mg by mouth.     • clotrimazole (LOTRIMIN) 1 % cream Apply  topically 2 (Two) Times a Day. Apply BID until rash on hand disappears and apply additional 2 days. 45 g 0   • escitalopram (LEXAPRO) 10 MG tablet Take 1 tablet by mouth Daily. 90 tablet 1   • fluconazole (DIFLUCAN) 200 MG tablet Take 1 tablet by mouth Daily. 2 tablet 0   • fluticasone (FLONASE) 50 MCG/ACT nasal spray 2 sprays into each nostril Daily.     • HYDROcodone-acetaminophen (NORCO) 5-325 MG per tablet Take 1 tablet by mouth 2 (Two) Times a Day As Needed for Moderate Pain . 30 tablet 0   • lidocaine (XYLOCAINE) 5 % ointment Apply  topically Every 2 (Two) Hours As Needed for Mild Pain (1-3). 50 g 1   • LORazepam (ATIVAN) 0.5 MG tablet Take 1 tablet by mouth Daily As Needed for Anxiety. 30 tablet 0   • magnesium oxide (MAGOX) 400  (241.3 Mg) MG tablet tablet Take 800 mg by mouth Daily.     • methIMAzole (TAPAZOLE) 5 MG tablet Alternate 2 tabs one day with 1 tab the other day. 45 tablet 11   • metoprolol succinate XL (TOPROL XL) 25 MG 24 hr tablet Take 12.5 mg by mouth daily.     • Multiple Vitamins-Minerals (MULTIVITAMIN PO) Take 1 tablet by mouth daily.     • naproxen (NAPROSYN) 375 MG tablet Take 1 tablet by mouth 2 (Two) Times a Day With Meals. 60 tablet 3   • naproxen (NAPROSYN) 375 MG tablet TAKE 1 TABLET BY MOUTH TWICE DAILY AS NEEDED FOR MILD PAIN.(1-3 ON PAIN SCALE) 30 tablet 0   • nystatin (MYCOSTATIN) 588039 UNIT/GM ointment Apply  topically 2 (Two) Times a Day. Apply 2 times a day until rash disappears and continue to apply ointment to area for 3 more days. 30 g 0   • Omega-3 Fatty Acids (FISH OIL CONCENTRATE) 1000 MG capsule Take 1 capsule by mouth 2 (two) times a day.     • omeprazole (priLOSEC) 20 MG capsule Take 1 capsule by mouth Daily. 90 capsule 1   • omeprazole (priLOSEC) 20 MG capsule TAKE 1 CAPSULE BY MOUTH DAILY 90 capsule 0   • phenazopyridine (PYRIDIUM) 100 MG tablet Take 1 tablet by mouth 3 (Three) Times a Day As Needed for bladder spasms. 15 tablet 0   • tiZANidine (ZANAFLEX) 4 MG tablet TAKE 1 TABLET BY MOUTH EVERY 6 HOURS AS NEEDED FOR MUSCLE SPASMS 120 tablet 0   • vitamin E 400 UNIT capsule Take 400 Units by mouth daily.       No facility-administered encounter medications on file as of 10/8/2018.      Hyperthyroidism  Component  Latest Ref Rng 5/23/2016 8/15/2016   TSH Baseline  0.46 - 4.68 uIU/ml 0.35 (L) 1.20   Free T4  0.78 - 2.19 ng/dl 0.95 0.86   Thyroid Stimulating Immunoglobulin  0 - 139 %       Thyroid Peroxidase Antibody  0 - 34 IU/mL       T3, Total  97 - 169 ng/dl   136               Lab Results   Component Value Date     TSH 1.040 05/22/2018            Lab Results   Component Value Date     GLUCOSE 101 (H) 05/22/2018     BUN 11 05/22/2018     CREATININE 0.55 05/22/2018     EGFRIFNONA 117 05/22/2018      BCR 20.0 05/22/2018     CO2 28.0 05/22/2018     CALCIUM 9.2 05/22/2018     ALBUMIN 4.20 05/22/2018     LABIL2 1.2 05/22/2018     AST 44 (H) 05/22/2018     ALT 34 05/22/2018           Lab Results   Component Value Date     WBC 4.71 05/22/2018     HGB 13.7 05/22/2018     HCT 40.6 05/22/2018     MCV 94.0 05/22/2018      05/22/2018               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 documented as back as 2014 and personally reviewed   Report from 2016 , compared to 2014 no change      Neg Thyroid Antibodies         On methimazole 5 mg daily , change to 5 alternated with 7.5 mg daily         Rationale for tx - severe osteoporosis and Takotsubo      Refer for MADERA ablation instead of methimazole     She had MADERA ablation on Sept 7, 2018     Off the methimazole      Component      Latest Ref Rng & Units 10/4/2018   TSH Baseline      0.460 - 4.680 mIU/mL 0.040 (L)   Free T4      0.78 - 2.19 ng/dL 1.19   T3, Free      2.0 - 4.4 pg/mL 4.0     She feels fine stay off methimazole    Repeat labs in 4 weeks and see  one week later      Osteoporosis     March 2016     Total Lumbar spine- 0.692 gm/cm2 T-Score -3.2      4 2017, repeat shows improvement on forteo          While on 50 th weekly , Vit D 83     For that reason I suggested to stop and start     calcium + D once daily  And vit D\              Lab Results   Component Value Date     RIWZ59YF 45.3 05/22/2018     PXUW12DX 47.6 11/21/2017     EMQS46PB 44.7 05/18/2017               Completed 2 years of forteo , started 27 of June 2016      PTH and calcium- nl     n-telopeptide - nl     urine calcium - nl        We need prolia , insurance is giving a hard time as always     I will simplify for them      Patient has severe osteoporosis  Has completed 2 years of forteo   Now needs to bridge  Start prolia  Nothing complicated about this.      Insurance wants for me to have a discussion about potential risk of ONJ. Patient aware and risk benefits discussed.     Patient has  had dental exam within the past 6 months     Previous bisphosphonates before forteo , fosamax , resulted in GERD         Menopausal syndrome (hot flashes)  I started estrogen patches but allergy     Now Taktsubo, I prefer no estrogen      Doug stared metoprolol                   Lab Results   Component Value Date     CHOL 176 11/21/2017     CHOL 178 05/18/2017            Lab Results   Component Value Date     TRIG 64 11/21/2017     TRIG 42 05/18/2017     TRIG 100 03/31/2016      Lab Results   Component Value Date     HDL 65 11/21/2017     HDL 68 05/18/2017     HDL 75 03/31/2016      No components found for: LDLCALC  Excellent without treatment           4. Return for Recheck.            Attending Only

## 2019-06-11 ENCOUNTER — OFFICE VISIT (OUTPATIENT)
Dept: FAMILY MEDICINE CLINIC | Facility: CLINIC | Age: 54
End: 2019-06-11

## 2019-06-11 VITALS
DIASTOLIC BLOOD PRESSURE: 74 MMHG | HEIGHT: 65 IN | BODY MASS INDEX: 25.89 KG/M2 | WEIGHT: 155.4 LBS | SYSTOLIC BLOOD PRESSURE: 104 MMHG

## 2019-06-11 DIAGNOSIS — Z00.00 ANNUAL PHYSICAL EXAM: Primary | ICD-10-CM

## 2019-06-11 DIAGNOSIS — L82.1 SEBORRHEIC KERATOSES: ICD-10-CM

## 2019-06-11 DIAGNOSIS — F41.1 GENERALIZED ANXIETY DISORDER: ICD-10-CM

## 2019-06-11 PROCEDURE — 99396 PREV VISIT EST AGE 40-64: CPT | Performed by: NURSE PRACTITIONER

## 2019-06-11 PROCEDURE — 17110 DESTRUCTION B9 LES UP TO 14: CPT | Performed by: NURSE PRACTITIONER

## 2019-06-11 RX ORDER — ESCITALOPRAM OXALATE 10 MG/1
10 TABLET ORAL DAILY
Qty: 90 TABLET | Refills: 1 | Status: SHIPPED | OUTPATIENT
Start: 2019-06-11 | End: 2020-06-29

## 2019-06-11 RX ORDER — TIZANIDINE 4 MG/1
TABLET ORAL
Qty: 120 TABLET | Refills: 0 | Status: SHIPPED | OUTPATIENT
Start: 2019-06-11 | End: 2019-07-02 | Stop reason: SDUPTHER

## 2019-06-11 NOTE — PROGRESS NOTES
Elizabeth Lopez is a 53 y.o. female.   Yearly physical exam     HPI:  Yearly physical exam       The following portions of the patient's history were reviewed and updated as appropriate:   She  has a past medical history of Anal fistula, Anemia, Astigmatism, Breast cyst, Broken heart syndrome (03/02/2016), Chronic pain disorder, Death of family member, Generalized anxiety disorder, GERD (gastroesophageal reflux disease), Goiter, Graves disease, Heartburn, Hematuria, Hyperthyroidism, Lesion of eyelid, Low back pain, Lumbosacral spondylosis, Menopausal syndrome, Myopia, Osteoporosis, Pain in back, Pain in joint involving left lower leg, Pain in wrist, Panic attack, Presbyopia, Right upper quadrant pain, Takotsubo cardiomyopathy, and Thoracic spondylosis.  She does not have any pertinent problems on file.  She  has a past surgical history that includes Skin biopsy (06/29/2014); Cardiac catheterization (03/02/2016); Eyelid carcinoma excision (06/15/2015); Wound Closure (07/20/2014); Wound Closure (01/16/2014); Colonoscopy (01/30/2016); Hysterectomy (04/14/2014); Tubal ligation; Cyst Removal (Right); and Breast cyst excision.  Her family history includes Breast cancer in her maternal aunt, other, and other; COPD in her brother and sister; Cancer in her father, maternal grandmother, mother, and other; Diabetes in her brother and sister; Heart disease in her mother; Hyperlipidemia in her mother; Hypertension in her mother and sister; Migraines in her sister; Osteoporosis in her mother; Other in her other; Thyroid disease in her sister.  She  reports that she has never smoked. She has never used smokeless tobacco. She reports that she does not drink alcohol or use drugs.  Current Outpatient Medications   Medication Sig Dispense Refill   • calcium carbonate 1250 MG capsule Take 1,250 mg by mouth Daily.     • escitalopram (LEXAPRO) 10 MG tablet Take 1 tablet by mouth Daily. 90 tablet 1   • GLUCOSAMINE-CHONDROITIN  PO Take  by mouth.     • HYDROcodone-acetaminophen (NORCO) 5-325 MG per tablet Take 1 tablet by mouth 2 (Two) Times a Day As Needed for Moderate Pain . (Patient taking differently: Take 1 tablet by mouth Every 6 (Six) Hours As Needed for Moderate Pain .) 30 tablet 0   • levothyroxine (SYNTHROID) 25 MCG tablet Take 1 tablet by mouth Daily. 30 tablet 11   • LORazepam (ATIVAN) 0.5 MG tablet Take 1 tablet by mouth Daily As Needed for Anxiety. 30 tablet 0   • magnesium oxide (MAGOX) 400 (241.3 Mg) MG tablet tablet Take 800 mg by mouth Daily.     • metoprolol succinate XL (TOPROL XL) 25 MG 24 hr tablet Take 12.5 mg by mouth daily.     • Multiple Vitamins-Minerals (MULTIVITAMIN PO) Take 1 tablet by mouth daily.     • naproxen (NAPROSYN) 375 MG tablet Take 1 tablet by mouth 2 (Two) Times a Day With Meals. 60 tablet 3   • Omega-3 Fatty Acids (FISH OIL CONCENTRATE) 1000 MG capsule Take 1 capsule by mouth 2 (two) times a day.     • omeprazole (priLOSEC) 20 MG capsule Take 1 capsule by mouth Daily. 90 capsule 1   • tiZANidine (ZANAFLEX) 4 MG tablet TAKE 1 TABLET BY MOUTH EVERY 6 HOURS AS NEEDED FOR MUSCLE SPASMS 120 tablet 0   • vitamin E 400 UNIT capsule Take 400 Units by mouth daily.     • tiZANidine (ZANAFLEX) 4 MG tablet TAKE 1 TABLET BY MOUTH EVERY 6 HOURS AS NEEDED FOR MUSCLE SPASMS 120 tablet 0     No current facility-administered medications for this visit.      Current Outpatient Medications on File Prior to Visit   Medication Sig   • calcium carbonate 1250 MG capsule Take 1,250 mg by mouth Daily.   • GLUCOSAMINE-CHONDROITIN PO Take  by mouth.   • HYDROcodone-acetaminophen (NORCO) 5-325 MG per tablet Take 1 tablet by mouth 2 (Two) Times a Day As Needed for Moderate Pain . (Patient taking differently: Take 1 tablet by mouth Every 6 (Six) Hours As Needed for Moderate Pain .)   • levothyroxine (SYNTHROID) 25 MCG tablet Take 1 tablet by mouth Daily.   • LORazepam (ATIVAN) 0.5 MG tablet Take 1 tablet by mouth Daily As  "Needed for Anxiety.   • magnesium oxide (MAGOX) 400 (241.3 Mg) MG tablet tablet Take 800 mg by mouth Daily.   • metoprolol succinate XL (TOPROL XL) 25 MG 24 hr tablet Take 12.5 mg by mouth daily.   • Multiple Vitamins-Minerals (MULTIVITAMIN PO) Take 1 tablet by mouth daily.   • naproxen (NAPROSYN) 375 MG tablet Take 1 tablet by mouth 2 (Two) Times a Day With Meals.   • Omega-3 Fatty Acids (FISH OIL CONCENTRATE) 1000 MG capsule Take 1 capsule by mouth 2 (two) times a day.   • omeprazole (priLOSEC) 20 MG capsule Take 1 capsule by mouth Daily.   • tiZANidine (ZANAFLEX) 4 MG tablet TAKE 1 TABLET BY MOUTH EVERY 6 HOURS AS NEEDED FOR MUSCLE SPASMS   • vitamin E 400 UNIT capsule Take 400 Units by mouth daily.   • [DISCONTINUED] escitalopram (LEXAPRO) 10 MG tablet Take 1 tablet by mouth Daily.   • [DISCONTINUED] tiZANidine (ZANAFLEX) 4 MG tablet TAKE 1 TABLET BY MOUTH EVERY 6 HOURS AS NEEDED FOR MUSCLE SPASMS     No current facility-administered medications on file prior to visit.      She has No Known Allergies..    Review of Systems   Constitutional: Negative for chills, diaphoresis, fatigue and fever.   HENT: Negative.    Eyes: Negative.    Respiratory: Negative.    Cardiovascular: Negative.    Gastrointestinal: Negative.    Genitourinary: Negative.    Musculoskeletal: Positive for arthralgias.   Skin: Negative.    Neurological: Negative.    Psychiatric/Behavioral: Negative for confusion. The patient is nervous/anxious.        Objective    Visit Vitals  /74   Ht 165.1 cm (65\")   Wt 70.5 kg (155 lb 6.4 oz)   LMP  (LMP Unknown)   BMI 25.86 kg/m²       Physical Exam   Constitutional: She is oriented to person, place, and time. She appears well-developed and well-nourished.   HENT:   Head: Normocephalic.   Right Ear: External ear normal.   Left Ear: External ear normal.   Eyes: EOM are normal. Pupils are equal, round, and reactive to light.   Neck: Normal range of motion. Neck supple.   Cardiovascular: Normal rate, " regular rhythm and normal heart sounds.   Pulmonary/Chest: Effort normal and breath sounds normal.   Abdominal: Soft. Bowel sounds are normal.   Musculoskeletal: Normal range of motion.   Neurological: She is alert and oriented to person, place, and time.   Skin: Skin is warm. Capillary refill takes less than 2 seconds.        Psychiatric: She has a normal mood and affect. Her behavior is normal.   Nursing note and vitals reviewed.      Assessment/Plan   Problems Addressed this Visit        Other    Generalized anxiety disorder    Relevant Medications    escitalopram (LEXAPRO) 10 MG tablet      Other Visit Diagnoses     Annual physical exam    -  Primary    Seborrheic keratoses            New Medications Ordered This Visit   Medications   • escitalopram (LEXAPRO) 10 MG tablet     Sig: Take 1 tablet by mouth Daily.     Dispense:  90 tablet     Refill:  1       1.  Annual physical exam:  Continue on current medications as previously prescribed   Return in about 6 months (around 12/11/2019) for Recheck.    2.  Generalized anxiety disorder:  Continue on Lexapro as previously prescribed and refill prescriptions sent to pharmacy   Encouraged to seek emergency medical treatment for any new or worsening signs of anxiety or depression such as suicidal/homicidal ideations or feelings of hopelessness or worthlessness    3.  Seborrheic keratosis:  Cryotherapy applied in office  Educated that skin will blister and instructed not to pop or poke blister        This document has been electronically signed by YOANA Arguello on June 11, 2019 9:34 AM

## 2019-06-27 ENCOUNTER — TELEPHONE (OUTPATIENT)
Dept: FAMILY MEDICINE CLINIC | Facility: CLINIC | Age: 54
End: 2019-06-27

## 2019-06-28 ENCOUNTER — LAB (OUTPATIENT)
Dept: LAB | Facility: HOSPITAL | Age: 54
End: 2019-06-28

## 2019-06-28 DIAGNOSIS — E89.0 POSTABLATIVE HYPOTHYROIDISM: ICD-10-CM

## 2019-06-28 PROCEDURE — 36415 COLL VENOUS BLD VENIPUNCTURE: CPT

## 2019-06-28 PROCEDURE — 84439 ASSAY OF FREE THYROXINE: CPT

## 2019-06-28 PROCEDURE — 84443 ASSAY THYROID STIM HORMONE: CPT

## 2019-06-29 LAB
T4 FREE SERPL-MCNC: 1.02 NG/DL (ref 0.93–1.7)
TSH SERPL DL<=0.05 MIU/L-ACNC: 1.5 MIU/ML (ref 0.27–4.2)

## 2019-07-02 ENCOUNTER — OFFICE VISIT (OUTPATIENT)
Dept: ENDOCRINOLOGY | Facility: CLINIC | Age: 54
End: 2019-07-02

## 2019-07-02 VITALS
DIASTOLIC BLOOD PRESSURE: 66 MMHG | OXYGEN SATURATION: 99 % | HEIGHT: 65 IN | SYSTOLIC BLOOD PRESSURE: 102 MMHG | HEART RATE: 80 BPM | WEIGHT: 156.1 LBS | BODY MASS INDEX: 26.01 KG/M2

## 2019-07-02 DIAGNOSIS — E55.9 VITAMIN D DEFICIENCY: ICD-10-CM

## 2019-07-02 DIAGNOSIS — M81.0 OSTEOPOROSIS, UNSPECIFIED OSTEOPOROSIS TYPE, UNSPECIFIED PATHOLOGICAL FRACTURE PRESENCE: ICD-10-CM

## 2019-07-02 DIAGNOSIS — E89.0 POSTABLATIVE HYPOTHYROIDISM: ICD-10-CM

## 2019-07-02 DIAGNOSIS — E05.00 GRAVES' DISEASE: Primary | ICD-10-CM

## 2019-07-02 PROCEDURE — 99214 OFFICE O/P EST MOD 30 MIN: CPT | Performed by: INTERNAL MEDICINE

## 2019-07-02 NOTE — PROGRESS NOTES
Elizabeth Lopez is a 53 y.o. female. she is here for follow up     Patient has stopped taking the multivitamin and is currently not taking her synthroid for several weeks, reports fatigue, heart palpiations.      Thyroid Problem   Symptoms include fatigue and palpitations. Patient reports no cold intolerance, constipation, diaphoresis, diarrhea, heat intolerance or tremors.   Hyperthyroidism   Associated symptoms include fatigue. Pertinent negatives include no abdominal pain, arthralgias, chest pain, coughing, diaphoresis, headaches, joint swelling, myalgias, nausea, neck pain, numbness, rash, sore throat, vomiting or weakness.   Graves' Disease   Associated symptoms include fatigue. Pertinent negatives include no abdominal pain, arthralgias, chest pain, coughing, diaphoresis, headaches, joint swelling, myalgias, nausea, neck pain, numbness, rash, sore throat, vomiting or weakness.          Primary Care/Referring Provider  Raiza Saini MD   follow up      reason - subclinical hyperthyroidism and osteoporosis     from 2-15 neg tsi and tpo ab and toxic subcm MNG , most likely still Graves'     She had MADERA ablation Sept 7, 2018     Pattern suggests central hypothyroidism       Thyroid US      bilateral sucm complex thyroid cysts  maximal dimension 5 mm      US dimensions - within normal limtis     personally reviewed from July 201 4 and stable when compared to July 2016     ==============     symptoms - anxiety       Component      Latest Ref Rng & Units 7/24/2018 10/4/2018 11/20/2018   TSH Baseline      0.460 - 4.680 mIU/mL 0.230 (L) 0.040 (L) 0.190 (L)   Free T4      0.78 - 2.19 ng/dL 0.90 1.19 0.74 (L)   T3, Free      2.0 - 4.4 pg/mL  4.0 3.1   Thyroid Stimulating Immunoglobulin      0.00 - 0.55 IU/L   <0.10               Evaluation history:  TSH   Date Value Ref Range Status   06/28/2019 1.500 0.270 - 4.200 mIU/mL Final     Free T4   Date Value Ref Range Status   06/28/2019 1.02 0.93 - 1.70  ng/dL Final     T3, Free   Date Value Ref Range Status   11/20/2018 3.1 2.0 - 4.4 pg/mL Final       Current medications:  Current Outpatient Medications   Medication Sig Dispense Refill   • calcium carbonate 1250 MG capsule Take 1,250 mg by mouth Daily.     • escitalopram (LEXAPRO) 10 MG tablet Take 1 tablet by mouth Daily. 90 tablet 1   • GLUCOSAMINE-CHONDROITIN PO Take  by mouth.     • HYDROcodone-acetaminophen (NORCO) 5-325 MG per tablet Take 1 tablet by mouth 2 (Two) Times a Day As Needed for Moderate Pain . (Patient taking differently: Take 1 tablet by mouth Every 6 (Six) Hours As Needed for Moderate Pain .) 30 tablet 0   • levothyroxine (SYNTHROID) 25 MCG tablet Take 1 tablet by mouth Daily. 30 tablet 11   • LORazepam (ATIVAN) 0.5 MG tablet Take 1 tablet by mouth Daily As Needed for Anxiety. 30 tablet 0   • magnesium oxide (MAGOX) 400 (241.3 Mg) MG tablet tablet Take 800 mg by mouth Daily.     • metoprolol succinate XL (TOPROL XL) 25 MG 24 hr tablet Take 12.5 mg by mouth daily.     • Multiple Vitamins-Minerals (MULTIVITAMIN PO) Take 1 tablet by mouth daily.     • naproxen (NAPROSYN) 375 MG tablet Take 1 tablet by mouth 2 (Two) Times a Day With Meals. 60 tablet 3   • Omega-3 Fatty Acids (FISH OIL CONCENTRATE) 1000 MG capsule Take 1 capsule by mouth 2 (two) times a day.     • omeprazole (priLOSEC) 20 MG capsule Take 1 capsule by mouth Daily. 90 capsule 1   • tiZANidine (ZANAFLEX) 4 MG tablet TAKE 1 TABLET BY MOUTH EVERY 6 HOURS AS NEEDED FOR MUSCLE SPASMS 120 tablet 0   • vitamin E 400 UNIT capsule Take 400 Units by mouth daily.     • tiZANidine (ZANAFLEX) 4 MG tablet TAKE 1 TABLET BY MOUTH EVERY 6 HOURS AS NEEDED FOR MUSCLE SPASMS 120 tablet 0     No current facility-administered medications for this visit.        The following portions of the patient's history were reviewed and updated as appropriate:   Past Medical History:   Diagnosis Date   • Anal fistula    • Anemia    • Astigmatism    • Breast cyst    •  Broken heart syndrome 2016    when mother passed away   • Chronic pain disorder    • Death of family member     Mother passes away.   • Generalized anxiety disorder    • GERD (gastroesophageal reflux disease)    • Goiter    • Graves disease    • Heartburn    • Hematuria    • Hyperthyroidism    • Lesion of eyelid     LLL   • Low back pain    • Lumbosacral spondylosis    • Menopausal syndrome    • Myopia    • Osteoporosis    • Pain in back     Lumbar region   • Pain in joint involving left lower leg    • Pain in wrist    • Panic attack    • Presbyopia    • Right upper quadrant pain    • Takotsubo cardiomyopathy    • Thoracic spondylosis      Past Surgical History:   Procedure Laterality Date   • BREAST CYST EXCISION     • CARDIAC CATHETERIZATION  2016    Normal coronaries with no obstructive disease. Nonischemic stress induced cardiomyopathy known as Takotsubo syndrome.   • COLONOSCOPY  2016    Removal of anal fistula   • CYST REMOVAL Right     right breast   • EYELID CARCINOMA EXCISION  06/15/2015   • HYSTERECTOMY  2014    Total abdominal hysterectomy, bilateral salpingo-oophorectomy. Menorrhagia and leiomyomatous uterus.   • SKIN BIOPSY  2014   • TUBAL ABDOMINAL LIGATION     • WOUND CLOSURE  2014    Layer closure of wound.   • WOUND CLOSURE  2014    Layer closure of wound     Family History   Problem Relation Age of Onset   • Heart disease Mother    • Hyperlipidemia Mother    • Hypertension Mother    • Osteoporosis Mother    • Cancer Mother    • Cancer Father    • Diabetes Sister    • Thyroid disease Sister    • COPD Sister    • Hypertension Sister    • Migraines Sister    • Diabetes Brother    • COPD Brother    • Cancer Maternal Grandmother         Ovarian Cancer   • Breast cancer Other    • Cancer Other    • Other Other         Gall Bladder disease   • Breast cancer Other    • Breast cancer Maternal Aunt      OB History      Para Term  AB Living    3 3 3       "    SAB TAB Ectopic Molar Multiple Live Births                       No Known Allergies  Social History     Socioeconomic History   • Marital status:      Spouse name: Not on file   • Number of children: Not on file   • Years of education: Not on file   • Highest education level: Not on file   Tobacco Use   • Smoking status: Never Smoker   • Smokeless tobacco: Never Used   Substance and Sexual Activity   • Alcohol use: No   • Drug use: No   • Sexual activity: Defer       Review of Systems  Review of Systems   Constitutional: Positive for fatigue and unexpected weight change. Negative for activity change, appetite change and diaphoresis.   HENT: Negative for facial swelling, sneezing, sore throat, tinnitus, trouble swallowing and voice change.    Eyes: Negative for photophobia, pain, discharge, redness, itching and visual disturbance.   Respiratory: Negative for apnea, cough, choking, chest tightness and shortness of breath.    Cardiovascular: Positive for palpitations. Negative for chest pain and leg swelling.   Gastrointestinal: Negative for abdominal distention, abdominal pain, constipation, diarrhea, nausea and vomiting.   Endocrine: Negative for cold intolerance, heat intolerance, polydipsia, polyphagia and polyuria.   Genitourinary: Negative for difficulty urinating, dysuria, frequency, hematuria and urgency.   Musculoskeletal: Negative for arthralgias, back pain, gait problem, joint swelling, myalgias, neck pain and neck stiffness.   Skin: Negative for color change, pallor, rash and wound.   Neurological: Negative for dizziness, tremors, weakness, light-headedness, numbness and headaches.   Hematological: Negative for adenopathy. Does not bruise/bleed easily.   Psychiatric/Behavioral: Negative for behavioral problems, confusion and sleep disturbance.        Objective    /66   Pulse 80   Ht 165.1 cm (65\")   Wt 70.8 kg (156 lb 1.6 oz)   LMP  (LMP Unknown)   SpO2 99%   BMI 25.98 kg/m² "   Physical Exam   Constitutional: She is oriented to person, place, and time. She appears well-developed and well-nourished. No distress.   HENT:   Head: Normocephalic and atraumatic.   Right Ear: External ear normal.   Left Ear: External ear normal.   Nose: Nose normal.   Eyes: Conjunctivae and EOM are normal. Pupils are equal, round, and reactive to light.   Neck: Normal range of motion. Neck supple. No tracheal deviation present. No thyromegaly present.   Cardiovascular: Normal rate, regular rhythm and normal heart sounds.   No murmur heard.  Pulmonary/Chest: Effort normal and breath sounds normal. No respiratory distress. She has no wheezes.   Abdominal: Soft. Bowel sounds are normal. There is no tenderness. There is no rebound and no guarding.   Musculoskeletal: Normal range of motion. She exhibits no edema, tenderness or deformity.   Neurological: She is alert and oriented to person, place, and time. No cranial nerve deficit.   Skin: Skin is warm and dry. No rash noted.   Psychiatric: She has a normal mood and affect. Her behavior is normal. Judgment and thought content normal.       Lab Review  Lab Results   Component Value Date    TSH 1.500 06/28/2019     Lab Results   Component Value Date    FREET4 1.02 06/28/2019        Assessment/Plan      1. Graves' disease    2. Postablative hypothyroidism    3. Osteoporosis, unspecified osteoporosis type, unspecified pathological fracture presence    4. Vitamin D deficiency    . This diagnosis was discussed and reviewed with the patient including the advantages of drug therapy.     1. Orders placed during this encounter include:  No orders of the defined types were placed in this encounter.      Medications prescribed:  Outpatient Encounter Medications as of 7/2/2019   Medication Sig Dispense Refill   • calcium carbonate 1250 MG capsule Take 1,250 mg by mouth Daily.     • escitalopram (LEXAPRO) 10 MG tablet Take 1 tablet by mouth Daily. 90 tablet 1   •  GLUCOSAMINE-CHONDROITIN PO Take  by mouth.     • HYDROcodone-acetaminophen (NORCO) 5-325 MG per tablet Take 1 tablet by mouth 2 (Two) Times a Day As Needed for Moderate Pain . (Patient taking differently: Take 1 tablet by mouth Every 6 (Six) Hours As Needed for Moderate Pain .) 30 tablet 0   • levothyroxine (SYNTHROID) 25 MCG tablet Take 1 tablet by mouth Daily. 30 tablet 11   • LORazepam (ATIVAN) 0.5 MG tablet Take 1 tablet by mouth Daily As Needed for Anxiety. 30 tablet 0   • magnesium oxide (MAGOX) 400 (241.3 Mg) MG tablet tablet Take 800 mg by mouth Daily.     • metoprolol succinate XL (TOPROL XL) 25 MG 24 hr tablet Take 12.5 mg by mouth daily.     • Multiple Vitamins-Minerals (MULTIVITAMIN PO) Take 1 tablet by mouth daily.     • naproxen (NAPROSYN) 375 MG tablet Take 1 tablet by mouth 2 (Two) Times a Day With Meals. 60 tablet 3   • Omega-3 Fatty Acids (FISH OIL CONCENTRATE) 1000 MG capsule Take 1 capsule by mouth 2 (two) times a day.     • omeprazole (priLOSEC) 20 MG capsule Take 1 capsule by mouth Daily. 90 capsule 1   • tiZANidine (ZANAFLEX) 4 MG tablet TAKE 1 TABLET BY MOUTH EVERY 6 HOURS AS NEEDED FOR MUSCLE SPASMS 120 tablet 0   • vitamin E 400 UNIT capsule Take 400 Units by mouth daily.     • tiZANidine (ZANAFLEX) 4 MG tablet TAKE 1 TABLET BY MOUTH EVERY 6 HOURS AS NEEDED FOR MUSCLE SPASMS 120 tablet 0     No facility-administered encounter medications on file as of 7/2/2019.      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 documented as back as 2014 and personally reviewed   Report from 2016 , compared to 2014 no change       She had MADERA ablation on Sept 7, 2018     Low Free T4, nl TSH x 2 , retry levothyroxine 25 mcgs and working     Measure other pituitary hormones - normal  ( FSH elevated, prolactin nl , HAMA neg )               Osteoporosis     March 2016     Total Lumbar spine- 0.692 gm/cm2 T-Score -3.2      4 2017, repeat shows improvement on forteo   Next April 2019        Completed 2 years of forteo , started 27 of June 2016     Now on prolia       We were doing 50 th weekly but now   calcium + D once daily       Lab Results   Component Value Date    LIMC38UM 57.3 11/20/2018    RVRQ76MT 60.2 10/04/2018    GPZP09VF 54.4 07/24/2018          PTH and calcium- nl    We discussed about risk of ONJ and atypical femur fractures      Menopausal syndrome (hot flashes)  I started estrogen patches but allergy     h o  Taktsubo, I prefer no estrogen      Doug started metoprolol             Lab Results   Component Value Date    CHOL 191 11/20/2018    CHLPL 171 03/31/2016    TRIG 68 11/20/2018    HDL 72 11/20/2018    LDL 95 11/20/2018     Off statins        4. No Follow-up on file.

## 2019-07-03 ENCOUNTER — TELEPHONE (OUTPATIENT)
Dept: FAMILY MEDICINE CLINIC | Facility: CLINIC | Age: 54
End: 2019-07-03

## 2019-07-03 NOTE — TELEPHONE ENCOUNTER
[facility]EA[facility] HAS CALLED AGAIN NEEDING ORDERS FOR MRI ON LEFT KNEE..SHE SAID SHE COULDN'T UNDERSTAND WHY [facility] HAS NOT PUT ORDERS IN ...SHE WAS INFORMED [facility] IS OUT OF OFFICE THIS WK AND WILL GET THIS MESSAGE NEXT WK WHEN BACK'  CALLBACK# [phone]

## 2019-07-08 DIAGNOSIS — M25.562 CHRONIC PAIN OF LEFT KNEE: Primary | ICD-10-CM

## 2019-07-08 DIAGNOSIS — G89.29 CHRONIC PAIN OF LEFT KNEE: Primary | ICD-10-CM

## 2019-07-08 DIAGNOSIS — R93.7 ABNORMAL BONE XRAY: ICD-10-CM

## 2019-07-09 NOTE — TELEPHONE ENCOUNTER
Can you please call and let her know that I was out of the office last week but her MRI has been ordered.  Thank you.

## 2019-07-10 RX ORDER — TIZANIDINE 4 MG/1
TABLET ORAL
Qty: 120 TABLET | Refills: 0 | Status: SHIPPED | OUTPATIENT
Start: 2019-07-10 | End: 2019-08-07 | Stop reason: SDUPTHER

## 2019-07-16 ENCOUNTER — HOSPITAL ENCOUNTER (OUTPATIENT)
Dept: MRI IMAGING | Facility: HOSPITAL | Age: 54
Discharge: HOME | End: 2019-07-16
Admitting: NURSE PRACTITIONER

## 2019-07-16 DIAGNOSIS — M25.562 CHRONIC PAIN OF LEFT KNEE: ICD-10-CM

## 2019-07-16 DIAGNOSIS — R93.7 ABNORMAL BONE XRAY: ICD-10-CM

## 2019-07-16 DIAGNOSIS — G89.29 CHRONIC PAIN OF LEFT KNEE: ICD-10-CM

## 2019-07-16 PROCEDURE — 73721 MRI JNT OF LWR EXTRE W/O DYE: CPT

## 2019-07-17 DIAGNOSIS — R93.6 ABNORMAL MRI, KNEE: Primary | ICD-10-CM

## 2019-07-29 DIAGNOSIS — M25.562 LEFT KNEE PAIN, UNSPECIFIED CHRONICITY: Primary | ICD-10-CM

## 2019-07-30 ENCOUNTER — OFFICE VISIT (OUTPATIENT)
Dept: ORTHOPEDIC SURGERY | Facility: CLINIC | Age: 54
End: 2019-07-30

## 2019-07-30 VITALS — BODY MASS INDEX: 26.61 KG/M2 | WEIGHT: 159.7 LBS | HEIGHT: 65 IN

## 2019-07-30 DIAGNOSIS — I10 ESSENTIAL HYPERTENSION: ICD-10-CM

## 2019-07-30 DIAGNOSIS — K21.9 GASTROESOPHAGEAL REFLUX DISEASE WITHOUT ESOPHAGITIS: ICD-10-CM

## 2019-07-30 DIAGNOSIS — M17.12 PRIMARY OSTEOARTHRITIS OF LEFT KNEE: Primary | ICD-10-CM

## 2019-07-30 DIAGNOSIS — M25.562 LEFT KNEE PAIN, UNSPECIFIED CHRONICITY: ICD-10-CM

## 2019-07-30 PROCEDURE — 99203 OFFICE O/P NEW LOW 30 MIN: CPT | Performed by: ORTHOPAEDIC SURGERY

## 2019-07-30 RX ORDER — MELOXICAM 15 MG/1
15 TABLET ORAL DAILY
Qty: 30 TABLET | Refills: 2 | Status: SHIPPED | OUTPATIENT
Start: 2019-07-30 | End: 2019-08-29

## 2019-07-30 NOTE — PROGRESS NOTES
Nerissa Lopez is a 53 y.o. female   Primary provider:  Marti Richardson APRN       Chief Complaint   Patient presents with   • Left Knee - Knee Pain       HISTORY OF PRESENT ILLNESS:   patient referred by Marti Richardson for left knee pain, pain started about one year ago, has progressively gotten worse in the last few month. Mri done on 7/16/2019  xrays done today.   Pain for several years but is slowly getting worse  No specific injury  Pain with activity.  Mostly dull aches, occasional sharp pains.  Intermittent swelling  Worse with prolonged standing/walking  No numbness or tingling  No PT on knee.  Taking naproxen - no real improvement.    Knee Pain    There was no injury mechanism. The pain is present in the left knee. The quality of the pain is described as aching. The pain is at a severity of 4/10. The pain is moderate. The pain has been intermittent since onset. Associated symptoms comments: Swelling, clicking, popping. . She reports no foreign bodies present. Exacerbated by: standing, walking.  She has tried rest, ice and heat for the symptoms.        CONCURRENT MEDICAL HISTORY:    Past Medical History:   Diagnosis Date   • Anal fistula    • Anemia    • Astigmatism    • Breast cyst    • Broken heart syndrome 03/02/2016    when mother passed away   • Chronic pain disorder    • Death of family member     Mother passes away.   • Generalized anxiety disorder    • GERD (gastroesophageal reflux disease)    • Goiter    • Graves disease    • Heartburn    • Hematuria    • Hyperthyroidism    • Lesion of eyelid     LLL   • Low back pain    • Lumbosacral spondylosis    • Menopausal syndrome    • Myopia    • Osteoporosis    • Pain in back     Lumbar region   • Pain in joint involving left lower leg    • Pain in wrist    • Panic attack    • Presbyopia    • Right upper quadrant pain    • Takotsubo cardiomyopathy    • Thoracic spondylosis        No Known Allergies      Current Outpatient Medications:   •  calcium carbonate 1250  MG capsule, Take 1,250 mg by mouth Daily., Disp: , Rfl:   •  escitalopram (LEXAPRO) 10 MG tablet, Take 1 tablet by mouth Daily., Disp: 90 tablet, Rfl: 1  •  GLUCOSAMINE-CHONDROITIN PO, Take  by mouth., Disp: , Rfl:   •  HYDROcodone-acetaminophen (NORCO) 5-325 MG per tablet, Take 1 tablet by mouth 2 (Two) Times a Day As Needed for Moderate Pain . (Patient taking differently: Take 1 tablet by mouth Every 6 (Six) Hours As Needed for Moderate Pain .), Disp: 30 tablet, Rfl: 0  •  levothyroxine (SYNTHROID) 25 MCG tablet, Take 1 tablet by mouth Daily., Disp: 30 tablet, Rfl: 11  •  LORazepam (ATIVAN) 0.5 MG tablet, Take 1 tablet by mouth Daily As Needed for Anxiety., Disp: 30 tablet, Rfl: 0  •  magnesium oxide (MAGOX) 400 (241.3 Mg) MG tablet tablet, Take 800 mg by mouth Daily., Disp: , Rfl:   •  metoprolol succinate XL (TOPROL XL) 25 MG 24 hr tablet, Take 12.5 mg by mouth daily., Disp: , Rfl:   •  Multiple Vitamins-Minerals (MULTIVITAMIN PO), Take 1 tablet by mouth daily., Disp: , Rfl:   •  Omega-3 Fatty Acids (FISH OIL CONCENTRATE) 1000 MG capsule, Take 1 capsule by mouth 2 (two) times a day., Disp: , Rfl:   •  omeprazole (priLOSEC) 20 MG capsule, Take 1 capsule by mouth Daily., Disp: 90 capsule, Rfl: 1  •  tiZANidine (ZANAFLEX) 4 MG tablet, TAKE 1 TABLET BY MOUTH EVERY 6 HOURS AS NEEDED FOR MUSCLE SPASMS, Disp: 120 tablet, Rfl: 0  •  tiZANidine (ZANAFLEX) 4 MG tablet, TAKE 1 TABLET BY MOUTH EVERY 6 HOURS AS NEEDED FOR MUSCLE SPASMS, Disp: 120 tablet, Rfl: 0  •  vitamin E 400 UNIT capsule, Take 400 Units by mouth daily., Disp: , Rfl:   •  meloxicam (MOBIC) 15 MG tablet, Take 1 tablet by mouth Daily for 30 days., Disp: 30 tablet, Rfl: 2    Past Surgical History:   Procedure Laterality Date   • BREAST CYST EXCISION     • CARDIAC CATHETERIZATION  03/02/2016    Normal coronaries with no obstructive disease. Nonischemic stress induced cardiomyopathy known as Takotsubo syndrome.   • COLONOSCOPY  01/30/2016    Removal of anal  "fistula   • CYST REMOVAL Right     right breast   • EYELID CARCINOMA EXCISION  06/15/2015   • HYSTERECTOMY  04/14/2014    Total abdominal hysterectomy, bilateral salpingo-oophorectomy. Menorrhagia and leiomyomatous uterus.   • SKIN BIOPSY  06/29/2014   • TUBAL ABDOMINAL LIGATION     • WOUND CLOSURE  07/20/2014    Layer closure of wound.   • WOUND CLOSURE  01/16/2014    Layer closure of wound       Family History   Problem Relation Age of Onset   • Heart disease Mother    • Hyperlipidemia Mother    • Hypertension Mother    • Osteoporosis Mother    • Cancer Mother    • Cancer Father    • Diabetes Sister    • Thyroid disease Sister    • COPD Sister    • Hypertension Sister    • Migraines Sister    • Diabetes Brother    • COPD Brother    • Cancer Maternal Grandmother         Ovarian Cancer   • Breast cancer Other    • Cancer Other    • Other Other         Gall Bladder disease   • Breast cancer Other    • Breast cancer Maternal Aunt        Social History     Socioeconomic History   • Marital status:      Spouse name: Not on file   • Number of children: Not on file   • Years of education: Not on file   • Highest education level: Not on file   Tobacco Use   • Smoking status: Never Smoker   • Smokeless tobacco: Never Used   Substance and Sexual Activity   • Alcohol use: No   • Drug use: No   • Sexual activity: Defer        Review of Systems   Constitutional: Negative for chills and fever.   Respiratory: Negative.    Cardiovascular: Negative.    Gastrointestinal: Negative.    Musculoskeletal:        Left knee pain   All other systems reviewed and are negative.      PHYSICAL EXAMINATION:       Ht 165.1 cm (65\")   Wt 72.4 kg (159 lb 11.2 oz)   LMP  (LMP Unknown)   BMI 26.58 kg/m²     Physical Exam   Constitutional: She is oriented to person, place, and time. She appears well-developed and well-nourished.   Musculoskeletal:        Right knee: She exhibits no effusion.   Neurological: She is alert and oriented to " person, place, and time.   Psychiatric: She has a normal mood and affect. Her behavior is normal. Judgment and thought content normal.       GAIT:     []  Normal  [x]  Antalgic    Assistive device: [x]  None  []  Walker     []  Crutches  []  Cane     []  Wheelchair  []  Stretcher    Right Knee Exam     Muscle Strength   The patient has normal right knee strength.    Tenderness   The patient is experiencing no tenderness.     Range of Motion   The patient has normal right knee ROM.    Tests   Varus: negative Valgus: negative  Drawer:  Anterior - negative        Other   Erythema: absent  Sensation: normal  Pulse: present  Swelling: none  Effusion: no effusion present      Left Knee Exam     Muscle Strength   The patient has normal left knee strength.    Tenderness   The patient is experiencing tenderness in the medial joint line (diffuse).    Range of Motion   Extension: -5   Flexion: 120     Tests   Varus: negative Valgus: negative  Drawer:  Anterior - negative     Posterior - negative    Other   Sensation: normal  Pulse: present  Swelling: none    Comments:  Crepitation with motion  Mild to moderate pain through arc of motion                  Mri Knee Left Without Contrast    Result Date: 7/16/2019  Narrative: MRI left knee. HISTORY: Left knee pain. Prior exam: Left knee January 23, 2018. TECHNIQUE: Multiplanar multisequence noncontrast images left knee. FINDINGS: Extensive degenerative osteoarthritic changes medial aspect left tibiofemoral joint. Severe joint space narrowing. Areas of grade 3 and grade 4 chondral malacia, osteochondral defects distal femur medial aspect and to lesser degree proximal tibia. Prominent osteophytes arising from the medial aspect tibiofemoral joint. Subtle bone edema distal femur and proximal tibia medial aspect probably reactive stress changes. Degenerative changes, grade 3, chondromalacia patellar articular hyaline cartilage, medial articular facet. Normal anterior and posterior  cruciate ligaments. Discoid type degenerative changes anterior horn medial meniscus. Anteromedial extrusion of the anterior horn often seen in association with degenerative arthritic changes. Medial meniscus is otherwise unremarkable. Normal lateral meniscus. Large intra-articular and supra patellar effusion. Large popliteal, Baker's cyst 6.0 x 1.64 x 3.31 cm in diameter. Additional posterior multiseptated cystic lesion in the midline two centimeters above the genu most likely extra-articular ganglion.     Impression: CONCLUSION: Extensive osteoarthritic degenerative changes especially the medial aspect tibiofemoral joint. Joint space narrowing, grade 3 and grade IV chondromalacia distal femur and proximal tibia. Prominent osteophytes arising from the medial aspect tibiofemoral joint. Subtle bone edema distal femur and proximal tibia medial aspect probably reactive stress changes. Grade 3, chondromalacia patellar articular hyaline cartilage. Discoid type degenerative changes anterior horn medial meniscus. No discrete tear. Anteromedial extrusion anterior horn often seen in association with degenerative osteoarthritic changes. Medial meniscus is otherwise unremarkable. Normal lateral meniscus. Large effusion. Large Baker's cyst. Additional posterior multiseptated cystic lesion most likely extra articular ganglion. Electronically signed by:  Jcarlos Zarate MD  7/16/2019 2:51 PM CDT Workstation: [id number]    Xr Knee 1 Or 2 View Left    Result Date: 7/30/2019  Narrative: Ordering Provider:  Osito Perrin MD Ordering Diagnosis/Indication:  Left knee pain, unspecified chronicity Procedure:  XR KNEE 1 OR 2 VW LEFT Exam Date:  7/30/19 COMPARISON:  Todays X-rays were compared to previous images dated January 23, 2018.     Impression:  AP bilateral standing of the knees with lateral of the left knee shows acceptable position and alignment with no evidence of acute bony abnormality.  Mild medial joint space narrowing is  noted in both knees.  Mild arthritic change noted in the patellofemoral compartment of the left knee.  No fracture or dislocation is noted.  No interval change from prior x-ray. Osito Perrin MD 7/30/19     Xr Knee Bilateral Ap Standing    Result Date: 7/30/2019  Narrative: Ordering Provider:  Osito Perrin MD Ordering Diagnosis/Indication:  Left knee pain, unspecified chronicity Procedure:  XR KNEE BILATERAL AP STANDING Exam Date:  7/30/19 COMPARISON:  Todays X-rays were compared to previous images dated January 23, 2018.     Impression:  AP bilateral standing of the knees with lateral of the left knee shows acceptable position and alignment with no evidence of acute bony abnormality.  Mild medial joint space narrowing is noted in both knees.  Mild arthritic change noted in the patellofemoral compartment of the left knee.  No fracture or dislocation is noted.  No interval change from prior x-ray. Osito Perrin MD 7/30/19     Mri Knee Left Without Contrast    Result Date: 7/16/2019  Narrative: MRI left knee. HISTORY: Left knee pain. Prior exam: Left knee January 23, 2018. TECHNIQUE: Multiplanar multisequence noncontrast images left knee. FINDINGS: Extensive degenerative osteoarthritic changes medial aspect left tibiofemoral joint. Severe joint space narrowing. Areas of grade 3 and grade 4 chondral malacia, osteochondral defects distal femur medial aspect and to lesser degree proximal tibia. Prominent osteophytes arising from the medial aspect tibiofemoral joint. Subtle bone edema distal femur and proximal tibia medial aspect probably reactive stress changes. Degenerative changes, grade 3, chondromalacia patellar articular hyaline cartilage, medial articular facet. Normal anterior and posterior cruciate ligaments. Discoid type degenerative changes anterior horn medial meniscus. Anteromedial extrusion of the anterior horn often seen in association with degenerative arthritic changes. Medial  meniscus is otherwise unremarkable. Normal lateral meniscus. Large intra-articular and supra patellar effusion. Large popliteal, Baker's cyst 6.0 x 1.64 x 3.31 cm in diameter. Additional posterior multiseptated cystic lesion in the midline two centimeters above the genu most likely extra-articular ganglion.     Impression: CONCLUSION: Extensive osteoarthritic degenerative changes especially the medial aspect tibiofemoral joint. Joint space narrowing, grade 3 and grade IV chondromalacia distal femur and proximal tibia. Prominent osteophytes arising from the medial aspect tibiofemoral joint. Subtle bone edema distal femur and proximal tibia medial aspect probably reactive stress changes. Grade 3, chondromalacia patellar articular hyaline cartilage. Discoid type degenerative changes anterior horn medial meniscus. No discrete tear. Anteromedial extrusion anterior horn often seen in association with degenerative osteoarthritic changes. Medial meniscus is otherwise unremarkable. Normal lateral meniscus. Large effusion. Large Baker's cyst. Additional posterior multiseptated cystic lesion most likely extra articular ganglion. Electronically signed by:  Jcarlos Zarate MD  7/16/2019 2:51 PM CDT Workstation: [id number]    Dexa Bone Density Axial    Result Date: 7/9/2019  Narrative: DEXA scan, bone density study. CLINICAL INDICATION: Osteoporosis screening evaluation COMPARISON: April 13, 2017. PROCEDURE/TECHNIQUE: Multiple images of the left hip and lumbar spine were obtained using dual absorption technique. FINDINGS: The exam is performed using the [facility] C unit.  Images are of satisfactory quality. Prior Lumbar spine:   0.743     gm/cm2     Prior Left Hip:            0.721     gm/cm2     Current Lumbar spine:   0.799     gm/cm2     T-Score -2.3    Current femoral neck Left Hip:            0.656     gm/cm2    T-Score -1.7      left hip total 0.859 gm/cm2.     Impression: This patient has bone density parameters in both the  lumbar spine and left hip in the mildly abnormal, osteopenia category. Bone density parameters in the lumbar spine have increased +7.4% since prior exam. Bone density parameters in the left hip have increased +19.2% since prior exam. WOR[employer]IZ[employer] CRITERIA FOR OSTEOPOROSIS DIAGNOSTIC CATEGORY    T-SCORE Normal......................................................0 to - 1.0 Osteopenia..............................................-1.0 to -2.5 Osteoporosis...........................................Greater than -2.5 (no history of fragility fractures)* Established Osteoporosis........................Greater than -2.5 (with history of fragility fractures)* *FRAGILITY FRACTURES: VERTEBRAE, HUMERUS, WRIST, HIP (OVER 40 YEARS OF AGE) Electronically signed by:  Jcarlos Zarate MD  7/9/2019 10:20 AM CDT Workstation: [id number]          ASSESSMENT:    Diagnoses and all orders for this visit:    Primary osteoarthritis of left knee  -     Ambulatory Referral to Physical Therapy Evaluate and treat    Left knee pain, unspecified chronicity  -     Ambulatory Referral to Physical Therapy Evaluate and treat    Gastroesophageal reflux disease without esophagitis  -     Ambulatory Referral to Physical Therapy Evaluate and treat    Essential hypertension  -     Ambulatory Referral to Physical Therapy Evaluate and treat    Other orders  -     Cancel: Large Joint Arthrocentesis  -     meloxicam (MOBIC) 15 MG tablet; Take 1 tablet by mouth Daily for 30 days.          PLAN    Plan steroid injection into left knee today however patient has had heart flutter with prior IM or oral steroids.    Change naproxen to meloxicam  Will start PT for ROM/STR exercises   General conditioning   Teach HEP   3-4 visits.    Discussed possible eventual TKA vs UKA    Patient's Body mass index is 26.58 kg/m². BMI is above normal parameters. Recommendations include: exercise counseling and nutrition counseling.    Return in about 6 weeks (around 9/10/2019)  for recheck.    Osito Perrin MD

## 2019-08-07 RX ORDER — TIZANIDINE 4 MG/1
TABLET ORAL
Qty: 120 TABLET | Refills: 0 | Status: SHIPPED | OUTPATIENT
Start: 2019-08-07 | End: 2019-09-07

## 2019-08-13 ENCOUNTER — HOSPITAL ENCOUNTER (OUTPATIENT)
Dept: PHYSICAL THERAPY | Facility: HOSPITAL | Age: 54
Setting detail: THERAPIES SERIES
Discharge: HOME | End: 2019-08-13

## 2019-08-13 DIAGNOSIS — M17.12 PRIMARY OSTEOARTHRITIS OF LEFT KNEE: ICD-10-CM

## 2019-08-13 DIAGNOSIS — M25.562 LEFT KNEE PAIN, UNSPECIFIED CHRONICITY: Primary | ICD-10-CM

## 2019-08-13 PROCEDURE — 97162 PT EVAL MOD COMPLEX 30 MIN: CPT | Performed by: PHYSICAL THERAPIST

## 2019-08-14 NOTE — THERAPY EVALUATION
Outpatient Physical Therapy Ortho Initial Evaluation  [facility]     Patient Name: Nerissa Lopez  : 1965  MRN: [id number]  Today's Date: 2019      Visit Date: 2019  Attendance:  (authorization required)  Subjective Improvement: n/a  Next MD Appt: 9/10/19  Recert Date: 9/3/19    Therapy Diagnosis: 1) L knee pain; 2) L knee OA         Past Medical History:   Diagnosis Date   • Anal fistula    • Anemia    • Astigmatism    • Breast cyst    • Broken heart syndrome 2016    when mother passed away   • Chronic pain disorder    • Death of family member     Mother passes away.   • Generalized anxiety disorder    • GERD (gastroesophageal reflux disease)    • Goiter    • Graves disease    • Heartburn    • Hematuria    • Hyperthyroidism    • Lesion of eyelid     LLL   • Low back pain    • Lumbosacral spondylosis    • Menopausal syndrome    • Myopia    • Osteoporosis    • Pain in back     Lumbar region   • Pain in joint involving left lower leg    • Pain in wrist    • Panic attack    • Presbyopia    • Right upper quadrant pain    • Takotsubo cardiomyopathy    • Thoracic spondylosis         Past Surgical History:   Procedure Laterality Date   • BREAST CYST EXCISION     • CARDIAC CATHETERIZATION  2016    Normal coronaries with no obstructive disease. Nonischemic stress induced cardiomyopathy known as Takotsubo syndrome.   • COLONOSCOPY  2016    Removal of anal fistula   • CYST REMOVAL Right     right breast   • EYELID CARCINOMA EXCISION  06/15/2015   • HYSTERECTOMY  2014    Total abdominal hysterectomy, bilateral salpingo-oophorectomy. Menorrhagia and leiomyomatous uterus.   • SKIN BIOPSY  2014   • TUBAL ABDOMINAL LIGATION     • WOUND CLOSURE  2014    Layer closure of wound.   • WOUND CLOSURE  2014    Layer closure of wound       Current Outpatient Medications:   •  calcium carbonate 1250 MG capsule, Take 1,250 mg by mouth Daily., Disp: , Rfl:   •   escitalopram (LEXAPRO) 10 MG tablet, Take 1 tablet by mouth Daily., Disp: 90 tablet, Rfl: 1  •  GLUCOSAMINE-CHONDROITIN PO, Take  by mouth., Disp: , Rfl:   •  HYDROcodone-acetaminophen (NORCO) 5-325 MG per tablet, Take 1 tablet by mouth 2 (Two) Times a Day As Needed for Moderate Pain . (Patient taking differently: Take 1 tablet by mouth Every 6 (Six) Hours As Needed for Moderate Pain .), Disp: 30 tablet, Rfl: 0  •  levothyroxine (SYNTHROID) 25 MCG tablet, Take 1 tablet by mouth Daily., Disp: 30 tablet, Rfl: 11  •  LORazepam (ATIVAN) 0.5 MG tablet, Take 1 tablet by mouth Daily As Needed for Anxiety., Disp: 30 tablet, Rfl: 0  •  magnesium oxide (MAGOX) 400 (241.3 Mg) MG tablet tablet, Take 800 mg by mouth Daily., Disp: , Rfl:   •  meloxicam (MOBIC) 15 MG tablet, Take 1 tablet by mouth Daily for 30 days., Disp: 30 tablet, Rfl: 2  •  metoprolol succinate XL (TOPROL XL) 25 MG 24 hr tablet, Take 12.5 mg by mouth daily., Disp: , Rfl:   •  Multiple Vitamins-Minerals (MULTIVITAMIN PO), Take 1 tablet by mouth daily., Disp: , Rfl:   •  Omega-3 Fatty Acids (FISH OIL CONCENTRATE) 1000 MG capsule, Take 1 capsule by mouth 2 (two) times a day., Disp: , Rfl:   •  omeprazole (priLOSEC) 20 MG capsule, Take 1 capsule by mouth Daily., Disp: 90 capsule, Rfl: 1  •  tiZANidine (ZANAFLEX) 4 MG tablet, TAKE 1 TABLET BY MOUTH EVERY 6 HOURS AS NEEDED FOR MUSCLE SPASMS, Disp: 120 tablet, Rfl: 0  •  tiZANidine (ZANAFLEX) 4 MG tablet, TAKE 1 TABLET BY MOUTH EVERY 6 HOURS AS NEEDED FOR MUSCLE SPASMS, Disp: 120 tablet, Rfl: 0  •  vitamin E 400 UNIT capsule, Take 400 Units by mouth daily., Disp: , Rfl:     No Known Allergies    Visit Dx:     ICD-10-CM ICD-9-CM   1. Left knee pain, unspecified chronicity M25.562 719.46   2. Primary osteoarthritis of left knee M17.12 715.16         Patient History     Row Name 08/13/19 0900             History    Chief Complaint  Pain;Swelling;Difficulty Walking;Difficulty with daily activities  -SS      Type of Pain   "Knee pain left  -      Date Current Problem(s) Began  -- approximately 1 yr  -      Brief Description of Current Complaint  Patient has chronic left knee pain that is getting worse. Has been diagnosed with primary osteoarthritis. Dr. Perrin has told her that she will need a knee replacement eventually. Pain walking, squatting, any activity. Has to take breaks at times due to pain. Heat, ice, and sitting down helps it feel better.  female with children. Lives in a single story house with 2 steps to enter from the garage and none inside. Pain with stairs.   -SS      Patient/Caregiver Goals  Improve strength \"I just want this to feel better.\"  -SS      Current Tobacco Use  No  -SS      Smoking Status  Never  -SS      Patient's Rating of General Health  Good  -SS      Occupation/sports/leisure activities  Unemployed. Hobbies: walk, read, grandchildren  -      What clinical tests have you had for this problem?  X-ray;MRI  -      Results of Clinical Tests  see EMR  -         Pain     Pain Location  Knee left  -      Pain at Present  5  -SS      Pain at Best  4 over past 30 days  -      Pain at Worst  8 over past 30 days  -      Pain Frequency  Constant/continuous  -      Pain Description  Aching;Dull occasional sharp; dull, aching radiates to foot  -      What Performance Factors Make the Current Problem(s) WORSE?  kneeling, walking, squatting, stairs, prolonged activity  -      What Performance Factors Make the Current Problem(s) BETTER?  sitting, heat, ice  -      Is your sleep disturbed?  Yes  -SS      Is medication used to assist with sleep?  No  -SS      Difficulties at work?  n/a  -SS      Difficulties with ADL's?  pain  -SS      Difficulties with recreational activities?  pain, restricts activity at times  -         Fall Risk Assessment    Any falls in the past year:  No  -SS      Does patient have a fear of falling  No  -SS         Daily Activities    Primary Language  English  -SS  "        Safety    Are you being hurt, hit, or frightened by anyone at home or in your life?  No  -SS      Are you being neglected by a caregiver  No  -SS        User Key  (r) = Recorded By, (t) = Taken By, (c) = Cosigned By    Initials Name Provider Type    Migue Grace PT DPT Physical Therapist          PT Ortho     Row Name 08/13/19 0900       Subjective Comments    Subjective Comments  see Therapy Patient History  -SS       Precautions and Contraindications    Precautions/Limitations  no known precautions/limitations  -SS    Precautions  osteopenia  -SS       Subjective Pain    Able to rate subjective pain?  yes  -SS    Pre-Treatment Pain Level  5  -SS    Post-Treatment Pain Level  5  -SS       Posture/Observations    Posture/Observations Comments  Patient presents wearing compression brace left knee. Stands with left knee flexed. Antalgic gait L knee with decreased knee extension throughout gait cycle.  -SS       Left Lower Ext    Lt Knee Extension/Flexion AROM  0-6-115 pain with flexion > extension  -SS       MMT (Manual Muscle Testing)    Rt Lower Ext  Rt Hip Flexion;Rt Hip Extension;Rt Hip ABduction;Rt Hip ADduction;Rt Hip Internal (Medial) Rotation;Rt Hip External (Lateral) Rotation;Rt Knee Extension;Rt Knee Flexion;Rt Ankle Plantarflexion;Rt Ankle Dorsiflexion  -SS    Lt Lower Ext  Lt Hip Flexion;Lt Hip ABduction;Lt Hip Extension;Lt Hip ADduction;Lt Hip Internal (Medial) Rotation;Lt Hip External (Lateral) Rotation;Lt Knee Extension;Lt Knee Flexion;Lt Ankle Plantarflexion;Lt Ankle Dorsiflexion  -SS       MMT Right Lower Ext    Rt Hip Flexion MMT, Gross Movement  (5/5) normal  -SS    Rt Hip Extension MMT, Gross Movement  (5/5) normal  -SS    Rt Hip ABduction MMT, Gross Movement  (5/5) normal  -SS    Rt Hip ADduction MMT, Gross Movement  (5/5) normal  -SS    Rt Hip Internal (Medial) Rotation MMT, Gross Movement  (5/5) normal  -SS    Rt Hip External (Lateral) Rotation MMT, Gross Movement  (5/5)  normal  -SS    Rt Knee Extension MMT, Gross Movement  (5/5) normal  -SS    Rt Knee Flexion MMT, Gross Movement  (5/5) normal  -SS    Rt Ankle Plantarflexion MMT, Gross Movement  (5/5) normal  -SS    Rt Ankle Dorsiflexion MMT, Gross Movement  (5/5) normal  -SS       MMT Left Lower Ext    Lt Hip Flexion MMT, Gross Movement  (5/5) normal  -SS    Lt Hip Extension MMT, Gross Movement  (4+/5) good plus  -SS    Lt Hip ABduction MMT, Gross Movement  (4-/5) good minus superior knee pain  -SS    Lt Hip ADduction MMT, Gross Movement  (5/5) normal  -SS    Lt Hip Internal (Medial) Rotation MMT, Gross Movement  (5/5) normal  -SS    Lt Hip External (Lateral) Rotation MMT, Gross Movement  (5/5) normal  -SS    Lt Knee Extension MMT, Gross Movement  (5/5) normal  -SS    Lt Knee Flexion MMT, Gross Movement  (5/5) normal posterior, medial, and lateral pain  -SS    Lt Ankle Plantarflexion MMT, Gross Movement  (5/5) normal  -SS    Lt Ankle Dorsiflexion MMT, Gross Movement  (5/5) normal  -SS    Lt Lower Extremity Comments   0 extension lag with SLR. Rectus femoris 3+/5 with pain across the patella. Generalized knee pain with testing.  -      User Key  (r) = Recorded By, (t) = Taken By, (c) = Cosigned By    Initials Name Provider Type    Migue Grace, PT DPT Physical Therapist          Therapy Education  Education Details: supine flexion SLR, seated HS stretch, LAQ  Given: HEP  Program: New  How Provided: Verbal, Demonstration, Written  Provided to: Patient  Level of Understanding: Verbalized, Demonstrated     PT OP Goals     Row Name 08/13/19 0900          PT Short Term Goals    STG Date to Achieve  -- deferred  -SS        Long Term Goals    LTG Date to Achieve  09/10/19  -SS     LTG 1  Independent with HEP/self-management.  -        Time Calculation    PT Goal Re-Cert Due Date  09/03/19  -       User Key  (r) = Recorded By, (t) = Taken By, (c) = Cosigned By    Initials Name Provider Type    Migue Grace,  PT DPT Physical Therapist          PT Assessment/Plan     Row Name 08/13/19 0900          PT Assessment    Functional Limitations  Impaired gait;Limitation in home management;Limitations in community activities;Limitations in functional capacity and performance;Performance in leisure activities;Performance in self-care ADL  -     Impairments  Pain;Range of motion;Muscle strength  -     Assessment Comments  Patient has knee pain and LE weakness with L knee OA. Would benefit from P.T. to establish HEP and work to improve strength and function.  -     Rehab Potential  -- good for goals  -     Patient/caregiver participated in establishment of treatment plan and goals  Yes  -SS     Patient would benefit from skilled therapy intervention  Yes  -SS        PT Plan    PT Frequency  1x/week;2x/week  -     Predicted Duration of Therapy Intervention (Therapy Eval)  3-4 visits  -     Planned CPT's?  PT EVAL MOD COMPLEXITY: 08920;PT THER PROC EA 15 MIN: 15582;PT HOT OR COLD PACK TREAT MCARE;PT ELECTRICAL STIM UNATTEND: ;PT THER SUPP EA 15 MIN  -     PT Plan Comments  ROM, stretching, LE strengthening, heat/ice with or without IFC estim as needed for pain.   -       User Key  (r) = Recorded By, (t) = Taken By, (c) = Cosigned By    Initials Name Provider Type     Migue Andujar, PT DPT Physical Therapist        Outcome Measure Options: Lower Extremity Functional Scale (LEFS)  Lower Extremity Functional Index  Any of your usual work, housework or school activities: Quite a bit of difficulty  Your usual hobbies, recreational or sporting activities: Quite a bit of difficulty  Getting into or out of the bath: No difficulty  Walking between rooms: A little bit of difficulty  Putting on your shoes or socks: No difficulty  Squatting: Extreme difficulty or unable to perform activity  Lifting an object, like a bag of groceries from the floor: Moderate difficulty  Performing light activities around your home:  Moderate difficulty  Performing heavy activities around your home: Extreme difficulty or unable to perform activity  Getting into or out of a car: Moderate difficulty  Walking 2 blocks: Extreme difficulty or unable to perform activity  Walking a mile: Extreme difficulty or unable to perform activity  Going up or down 10 stairs (about 1 flight of stairs): Extreme difficulty or unable to perform activity  Standing for 1 hour: Extreme difficulty or unable to perform activity  Sitting for 1 hour: Quite a bit of difficulty  Running on even ground: Extreme difficulty or unable to perform activity  Running on uneven ground: Extreme difficulty or unable to perform activity  Making sharp turns while running fast: Extreme difficulty or unable to perform activity  Hopping: Extreme difficulty or unable to perform activity  Rolling over in bed: Moderate difficulty  Total: 22      Time Calculation:     Start Time: 0932  Stop Time: 1010  Time Calculation (min): 38 min     Therapy Charges for Today     Code Description Service Date Service Provider Modifiers Qty    08110805390 HC PT EVAL MOD COMPLEXITY 3 8/13/2019 Migue Andujar, PT DPT GP 1                   Migue Andujar PT, DPT, CHT  8/13/2019

## 2019-08-20 ENCOUNTER — APPOINTMENT (OUTPATIENT)
Dept: PHYSICAL THERAPY | Facility: HOSPITAL | Age: 54
End: 2019-08-20

## 2019-08-22 ENCOUNTER — HOSPITAL ENCOUNTER (OUTPATIENT)
Dept: PHYSICAL THERAPY | Facility: HOSPITAL | Age: 54
Setting detail: THERAPIES SERIES
Discharge: HOME | End: 2019-08-22

## 2019-08-22 DIAGNOSIS — M25.562 LEFT KNEE PAIN, UNSPECIFIED CHRONICITY: Primary | ICD-10-CM

## 2019-08-22 DIAGNOSIS — M17.12 PRIMARY OSTEOARTHRITIS OF LEFT KNEE: ICD-10-CM

## 2019-08-22 PROCEDURE — 97110 THERAPEUTIC EXERCISES: CPT | Performed by: PHYSICAL THERAPIST

## 2019-08-22 NOTE — THERAPY TREATMENT NOTE
Outpatient Physical Therapy Ortho Treatment Note  [facility]     Patient Name: Nerissa Lopez  : 1965  MRN: [id number]  Today's Date: 2019      Visit Date: 2019  Attendance:  (authorization required)  Subjective Improvement: none  Next MD Appt: 9/10/19  Recert Date: 9/3/19    Visit Dx:    ICD-10-CM ICD-9-CM   1. Left knee pain, unspecified chronicity M25.562 719.46   2. Primary osteoarthritis of left knee M17.12 715.16       Patient Active Problem List   Diagnosis   • Hyperthyroidism   • Osteoporosis   • Vitamin D deficiency   • Menopausal syndrome (hot flashes)   • Anal fistula   • Anemia   • Astigmatism   • Death of family member   • Gastroesophageal reflux disease   • Generalized anxiety disorder   • Goiter   • Graves' disease   • Hematuria   • Lesion of eyelid   • Lumbosacral spondylolysis   • Menopausal syndrome   • Myopia   • Lumbar back pain   • Panic attack   • Presbyopia   • Takotsubo cardiomyopathy   • Thoracic spondylosis   • Spondylolisthesis of lumbar region   • Left knee pain        Past Medical History:   Diagnosis Date   • Anal fistula    • Anemia    • Astigmatism    • Breast cyst    • Broken heart syndrome 2016    when mother passed away   • Chronic pain disorder    • Death of family member     Mother passes away.   • Generalized anxiety disorder    • GERD (gastroesophageal reflux disease)    • Goiter    • Graves disease    • Heartburn    • Hematuria    • Hyperthyroidism    • Lesion of eyelid     LLL   • Low back pain    • Lumbosacral spondylosis    • Menopausal syndrome    • Myopia    • Osteoporosis    • Pain in back     Lumbar region   • Pain in joint involving left lower leg    • Pain in wrist    • Panic attack    • Presbyopia    • Right upper quadrant pain    • Takotsubo cardiomyopathy    • Thoracic spondylosis         Past Surgical History:   Procedure Laterality Date   • BREAST CYST EXCISION     • CARDIAC CATHETERIZATION  2016    Normal coronaries  "with no obstructive disease. Nonischemic stress induced cardiomyopathy known as Takotsubo syndrome.   • COLONOSCOPY  01/30/2016    Removal of anal fistula   • CYST REMOVAL Right     right breast   • EYELID CARCINOMA EXCISION  06/15/2015   • HYSTERECTOMY  04/14/2014    Total abdominal hysterectomy, bilateral salpingo-oophorectomy. Menorrhagia and leiomyomatous uterus.   • SKIN BIOPSY  06/29/2014   • TUBAL ABDOMINAL LIGATION     • WOUND CLOSURE  07/20/2014    Layer closure of wound.   • WOUND CLOSURE  01/16/2014    Layer closure of wound       PT Ortho     Row Name 08/22/19 1600       Precautions and Contraindications    Precautions/Limitations  no known precautions/limitations  -BS    Precautions  osteopenia  -BS       Subjective Pain    Able to rate subjective pain?  yes  -BS    Pre-Treatment Pain Level  6  -BS    Post-Treatment Pain Level  6  -BS      User Key  (r) = Recorded By, (t) = Taken By, (c) = Cosigned By    Initials Name Provider Type    Rocco Garcia, PT Physical Therapist                      PT Assessment/Plan     Row Name 08/22/19 1603          PT Assessment    Assessment Comments  Good tolerance to tx, slight increase in baseline pain with loaded fwd step downs on 6\" step.   -BS        PT Plan    PT Plan Comments  attempt hip hikes, recheck ROM.   -BS       User Key  (r) = Recorded By, (t) = Taken By, (c) = Cosigned By    Initials Name Provider Type    Rocco Garcia, PT Physical Therapist          Modalities     Row Name 08/22/19 1600             Ice    Ice Applied  No issued ice to go  -BS        User Key  (r) = Recorded By, (t) = Taken By, (c) = Cosigned By    Initials Name Provider Type    Rocco Garcia, PT Physical Therapist        Exercises     Row Name 08/22/19 1600             Subjective Comments    Subjective Comments  pt reports walking and climbing stairs aggravates her L knee pain.   -BS         Subjective Pain    Able to rate subjective pain?  yes  -BS      Pre-Treatment Pain " "Level  6  -BS      Post-Treatment Pain Level  6  -BS         Exercise 1    Exercise Name 1  Pro II, level 2  -BS      Time 1  10  -BS         Exercise 2    Exercise Name 2  fwd lunge S  -BS      Sets 2  1  -BS      Reps 2  10  -BS      Time 2  5\" hold  -BS         Exercise 3    Exercise Name 3  standing B gastroc S w/ heels dangling off 4\" step  -BS      Sets 3  1  -BS      Reps 3  2  -BS      Time 3  30\" hold  -BS         Exercise 4    Exercise Name 4  L standing HS S  -BS      Sets 4  1  -BS      Reps 4  2  -BS      Time 4  30\" hold  -BS         Exercise 5    Exercise Name 5  fwd step ups, 4\"  -BS      Sets 5  1  -BS      Reps 5  10  -BS         Exercise 6    Exercise Name 6  fwd step down, 6\"  -BS      Sets 6  1  -BS      Reps 6  10  -BS      Additional Comments  increased L knee pain  -BS         Exercise 7    Exercise Name 7  3 way hip w/ red TB  -BS      Sets 7  1  -BS      Reps 7  15 ea  -BS      Time 7  flex, abd, ext  -BS         Exercise 8    Exercise Name 8  resisted TKE w/ red TB  -BS      Sets 8  1  -BS      Reps 8  20  -BS      Additional Comments  L LE only  -BS         Exercise 9    Exercise Name 9  QS w/ heel prop  -BS      Sets 9  1  -BS      Reps 9  15  -BS      Time 9  5\" hold  -BS         Exercise 10    Exercise Name 10  L SLR  -BS      Sets 10  1  -BS      Reps 10  10  -BS        User Key  (r) = Recorded By, (t) = Taken By, (c) = Cosigned By    Initials Name Provider Type    BS Rocco Preston, PT Physical Therapist                       PT OP Goals     Row Name 08/22/19 1600          PT Short Term Goals    STG Date to Achieve  -- deferred  -BS        Long Term Goals    LTG Date to Achieve  09/10/19  -BS     LTG 1  Independent with HEP/self-management.  -BS        Time Calculation    PT Goal Re-Cert Due Date  09/03/19  -BS       User Key  (r) = Recorded By, (t) = Taken By, (c) = Cosigned By    Initials Name Provider Type    BS Rocco Preston, PT Physical Therapist          Therapy " Education  Education Details: HEP: see flow sheet  Given: HEP  Program: New  How Provided: Verbal, Demonstration  Provided to: Patient  Level of Understanding: Verbalized, Demonstrated              Time Calculation:   Start Time: 1603  Stop Time: 1645  Time Calculation (min): 42 min  Total Timed Code Minutes- PT: 42 minute(s)  Therapy Charges for Today     Code Description Service Date Service Provider Modifiers Qty    70568894000 HC PT THER PROC EA 15 MIN 8/22/2019 Rocco Preston, PT GP 3                    Rocco Preston, PT  8/22/2019

## 2019-08-28 ENCOUNTER — APPOINTMENT (OUTPATIENT)
Dept: PHYSICAL THERAPY | Facility: HOSPITAL | Age: 54
End: 2019-08-28

## 2019-08-30 ENCOUNTER — HOSPITAL ENCOUNTER (OUTPATIENT)
Dept: PHYSICAL THERAPY | Facility: HOSPITAL | Age: 54
Setting detail: THERAPIES SERIES
Discharge: HOME | End: 2019-08-30

## 2019-08-30 DIAGNOSIS — M25.562 LEFT KNEE PAIN, UNSPECIFIED CHRONICITY: Primary | ICD-10-CM

## 2019-08-30 DIAGNOSIS — M17.12 PRIMARY OSTEOARTHRITIS OF LEFT KNEE: ICD-10-CM

## 2019-08-30 PROCEDURE — 97110 THERAPEUTIC EXERCISES: CPT

## 2019-08-30 NOTE — THERAPY TREATMENT NOTE
Outpatient Physical Therapy Ortho Treatment Note  [facility]     Patient Name: Nerissa Lopez  : 1965  MRN: [id number]  Today's Date: 2019      Visit Date: 2019     Sub imp some  Visit 3/3 (Eval + 8)  MD 9/10/19  RE 9/3/19    Visit Dx:    ICD-10-CM ICD-9-CM   1. Left knee pain, unspecified chronicity M25.562 719.46   2. Primary osteoarthritis of left knee M17.12 715.16       Patient Active Problem List   Diagnosis   • Hyperthyroidism   • Osteoporosis   • Vitamin D deficiency   • Menopausal syndrome (hot flashes)   • Anal fistula   • Anemia   • Astigmatism   • Death of family member   • Gastroesophageal reflux disease   • Generalized anxiety disorder   • Goiter   • Graves' disease   • Hematuria   • Lesion of eyelid   • Lumbosacral spondylolysis   • Menopausal syndrome   • Myopia   • Lumbar back pain   • Panic attack   • Presbyopia   • Takotsubo cardiomyopathy   • Thoracic spondylosis   • Spondylolisthesis of lumbar region   • Left knee pain        Past Medical History:   Diagnosis Date   • Anal fistula    • Anemia    • Astigmatism    • Breast cyst    • Broken heart syndrome 2016    when mother passed away   • Chronic pain disorder    • Death of family member     Mother passes away.   • Generalized anxiety disorder    • GERD (gastroesophageal reflux disease)    • Goiter    • Graves disease    • Heartburn    • Hematuria    • Hyperthyroidism    • Lesion of eyelid     LLL   • Low back pain    • Lumbosacral spondylosis    • Menopausal syndrome    • Myopia    • Osteoporosis    • Pain in back     Lumbar region   • Pain in joint involving left lower leg    • Pain in wrist    • Panic attack    • Presbyopia    • Right upper quadrant pain    • Takotsubo cardiomyopathy    • Thoracic spondylosis         Past Surgical History:   Procedure Laterality Date   • BREAST CYST EXCISION     • CARDIAC CATHETERIZATION  2016    Normal coronaries with no obstructive disease. Nonischemic stress induced  cardiomyopathy known as Takotsubo syndrome.   • COLONOSCOPY  01/30/2016    Removal of anal fistula   • CYST REMOVAL Right     right breast   • EYELID CARCINOMA EXCISION  06/15/2015   • HYSTERECTOMY  04/14/2014    Total abdominal hysterectomy, bilateral salpingo-oophorectomy. Menorrhagia and leiomyomatous uterus.   • SKIN BIOPSY  06/29/2014   • TUBAL ABDOMINAL LIGATION     • WOUND CLOSURE  07/20/2014    Layer closure of wound.   • WOUND CLOSURE  01/16/2014    Layer closure of wound       PT Ortho     Row Name 08/30/19 0900       Subjective Pain    Post-Treatment Pain Level  4  (Pended)   -       Left Lower Ext    Lt Knee Extension/Flexion AROM  0-114 post. (Started at 5 degree lag)  (Pended)   -      User Key  (r) = Recorded By, (t) = Taken By, (c) = Cosigned By    Initials Name Provider Type    Ethan Barber, [facility]                       PT Assessment/Plan     Row Name 08/30/19 1022          PT Assessment    Assessment Comments  Annalise Rx well. Achieved full extension post prop. Ex sheets for HEP, demo'ed appropriately  (Pended)   -        PT Plan    PT Frequency  1x/week;2x/week  (Pended)   -     Predicted Duration of Therapy Intervention (Therapy Eval)  3-4visits  (Pended)   -     PT Plan Comments  Cont per POC. Progress as annalise.   (Pended)   -       User Key  (r) = Recorded By, (t) = Taken By, (c) = Cosigned By    Initials Name Provider Type    Ethan Barber, [facility]             Exercises     Row Name 08/30/19 0900             Subjective Comments    Subjective Comments  Reports Cont L knee pain, no new complaints  (Pended)   -         Subjective Pain    Able to rate subjective pain?  yes  (Pended)   -NORY      Pre-Treatment Pain Level  5  (Pended)   -NORY      Post-Treatment Pain Level  4  (Pended)   -         Exercise 1    Exercise Name 1  Pro II, level 3  (Pended)   -      Time 1  10  (Pended)   -      Additional Comments  L3  (Pended)   -         Exercise 2     "Exercise Name 2  fwd lunge S  (Pended)   -NORY      Sets 2  1  (Pended)   -NORY      Reps 2  10  (Pended)   -ONRY      Time 2  5\" hold  (Pended)   -NORY         Exercise 3    Exercise Name 3  standing B gastroc S w/ heels dangling off 4\" step  (Pended)   -NORY      Sets 3  1  (Pended)   -NORY      Reps 3  3  (Pended)   -NORY      Time 3  30\" hold  (Pended)   -NORY         Exercise 4    Exercise Name 4  L standing HS S  (Pended)   -NORY      Sets 4  1  (Pended)   -NORY      Reps 4  3  (Pended)   -NORY      Time 4  30\" hold  (Pended)   -NORY         Exercise 5    Exercise Name 5  fwd step ups, 4\"  (Pended)   -NORY      Sets 5  2  (Pended)   -NORY      Reps 5  10  (Pended)   -NORY      Additional Comments  4\"  (Pended)   -NORY         Exercise 6    Exercise Name 6  fwd step down, 6\"  (Pended)   -NORY      Sets 6  2  (Pended)   -NORY      Reps 6  10  (Pended)   -NORY      Additional Comments  4\"  (Pended)   -NORY         Exercise 7    Exercise Name 7  3 way hip w/ red TB  (Pended)   -NORY      Sets 7  1  (Pended)   -NORY      Reps 7  15 ea  (Pended)   -NORY      Time 7  flex, abd, ext  (Pended)   -NORY         Exercise 8    Exercise Name 8  resisted TKE w/ red TB  (Pended)   -NORY      Sets 8  1  (Pended)   -NORY      Reps 8  20  (Pended)   -NORY         Exercise 9    Exercise Name 9  Seated Ham ball Iso  (Pended)   -NORY      Sets 9  2  (Pended)   -NORY      Reps 9  10  (Pended)   -NORY      Time 9  --  (Pended)   -NORY         Exercise 10    Exercise Name 10  L SLR  (Pended)   -NORY      Sets 10  2  (Pended)   -NORY      Reps 10  10  (Pended)   -NORY         Exercise 11    Exercise Name 11  Seated Ball Cocontraction  (Pended)   -NORY      Sets 11  2  (Pended)   -NORY      Reps 11  10  (Pended)   -NORY         Exercise 12    Exercise Name 12  Ext prop w/QS  (Pended)   -NORY         Exercise 13    Exercise Name 13  Reviewed HEP  (Pended)   -NORY        User Key  (r) = Recorded By, (t) = Taken By, (c) = Cosigned By    Initials Name Provider Type    NORY Ethan Scales, ATC     "                    PT OP Goals     Row Name 08/30/19 0900          PT Short Term Goals    STG Date to Achieve  --  (Pended)  deferred  -NORY        Long Term Goals    LTG Date to Achieve  09/10/19  (Pended)   -NORY     LTG 1  Independent with HEP/self-management.  (Pended)   -NORY     LTG 1 Progress  Progressing  (Pended)   -       User Key  (r) = Recorded By, (t) = Taken By, (c) = Cosigned By    Initials Name Provider Type    NORY Ethan Sclaes, ATC           Therapy Education  Given: (P) HEP  Program: (P) Reinforced, New  How Provided: (P) Verbal, Written  Provided to: (P) Patient  Level of Understanding: (P) Verbalized, Demonstrated              Time Calculation:   Start Time: (P) 0935  Stop Time: (P) 1020  Time Calculation (min): (P) 45 min  Total Timed Code Minutes- PT: (P) 45 minute(s)  Therapy Charges for Today     Code Description Service Date Service Provider Modifiers Qty    31740396673 HC PT THER PROC EA 15 MIN 8/30/2019 Ethan Scales, ATC  3                    Ethan Scales ATC  8/30/2019

## 2019-09-06 ENCOUNTER — HOSPITAL ENCOUNTER (OUTPATIENT)
Dept: PHYSICAL THERAPY | Facility: HOSPITAL | Age: 54
Setting detail: THERAPIES SERIES
Discharge: HOME | End: 2019-09-06

## 2019-09-06 DIAGNOSIS — M25.562 LEFT KNEE PAIN, UNSPECIFIED CHRONICITY: Primary | ICD-10-CM

## 2019-09-06 DIAGNOSIS — M17.12 PRIMARY OSTEOARTHRITIS OF LEFT KNEE: ICD-10-CM

## 2019-09-06 PROCEDURE — 97110 THERAPEUTIC EXERCISES: CPT

## 2019-09-06 NOTE — THERAPY TREATMENT NOTE
Outpatient Physical Therapy Ortho Treatment Note  [facility]     Patient Name: Nerissa Lopez  : 1965  MRN: [id number]  Today's Date: 2019      Visit Date: 2019     Sub imp 60%  Visit  (eval + 8)  MD 9/10/19  RE 9/3/19    Visit Dx:    ICD-10-CM ICD-9-CM   1. Left knee pain, unspecified chronicity M25.562 719.46   2. Primary osteoarthritis of left knee M17.12 715.16       Patient Active Problem List   Diagnosis   • Hyperthyroidism   • Osteoporosis   • Vitamin D deficiency   • Menopausal syndrome (hot flashes)   • Anal fistula   • Anemia   • Astigmatism   • Death of family member   • Gastroesophageal reflux disease   • Generalized anxiety disorder   • Goiter   • Graves' disease   • Hematuria   • Lesion of eyelid   • Lumbosacral spondylolysis   • Menopausal syndrome   • Myopia   • Lumbar back pain   • Panic attack   • Presbyopia   • Takotsubo cardiomyopathy   • Thoracic spondylosis   • Spondylolisthesis of lumbar region   • Left knee pain        Past Medical History:   Diagnosis Date   • Anal fistula    • Anemia    • Astigmatism    • Breast cyst    • Broken heart syndrome 2016    when mother passed away   • Chronic pain disorder    • Death of family member     Mother passes away.   • Generalized anxiety disorder    • GERD (gastroesophageal reflux disease)    • Goiter    • Graves disease    • Heartburn    • Hematuria    • Hyperthyroidism    • Lesion of eyelid     LLL   • Low back pain    • Lumbosacral spondylosis    • Menopausal syndrome    • Myopia    • Osteoporosis    • Pain in back     Lumbar region   • Pain in joint involving left lower leg    • Pain in wrist    • Panic attack    • Presbyopia    • Right upper quadrant pain    • Takotsubo cardiomyopathy    • Thoracic spondylosis         Past Surgical History:   Procedure Laterality Date   • BREAST CYST EXCISION     • CARDIAC CATHETERIZATION  2016    Normal coronaries with no obstructive disease. Nonischemic stress induced  cardiomyopathy known as Takotsubo syndrome.   • COLONOSCOPY  01/30/2016    Removal of anal fistula   • CYST REMOVAL Right     right breast   • EYELID CARCINOMA EXCISION  06/15/2015   • HYSTERECTOMY  04/14/2014    Total abdominal hysterectomy, bilateral salpingo-oophorectomy. Menorrhagia and leiomyomatous uterus.   • SKIN BIOPSY  06/29/2014   • TUBAL ABDOMINAL LIGATION     • WOUND CLOSURE  07/20/2014    Layer closure of wound.   • WOUND CLOSURE  01/16/2014    Layer closure of wound       PT Ortho     Row Name 09/06/19 1100       Left Lower Ext    Lt Knee Extension/Flexion AROM  0-120  (Pended)   -       MMT Left Lower Ext    Lt Hip Flexion MMT, Gross Movement  (5/5) normal  (Pended)   -    Lt Hip Extension MMT, Gross Movement  (5/5) normal  (Pended)   -    Lt Hip ABduction MMT, Gross Movement  (5/5) normal  (Pended)   -    Lt Hip ADduction MMT, Gross Movement  (5/5) normal  (Pended)   -    Lt Hip Internal (Medial) Rotation MMT, Gross Movement  (5/5) normal  (Pended)   -    Lt Hip External (Lateral) Rotation MMT, Gross Movement  (5/5) normal  (Pended)   -    Lt Knee Extension MMT, Gross Movement  (5/5) normal  (Pended)   -    Lt Knee Flexion MMT, Gross Movement  (5/5) normal  (Pended)   -    Lt Ankle Plantarflexion MMT, Gross Movement  (5/5) normal  (Pended)   -    Lt Ankle Dorsiflexion MMT, Gross Movement  (5/5) normal  (Pended)   -      User Key  (r) = Recorded By, (t) = Taken By, (c) = Cosigned By    Initials Name Provider Type    Ethan Braber, ATC                       PT Assessment/Plan     Row Name 09/06/19 1919          PT Assessment    Assessment Comments  Carmella Rx well. Ex sheets for new HEP, demo'ed appropriately. Ind. with previous HEP. Pt given paperwork for 1 free month of FF. MD note faxed to MD office  (Pended)   -NORY        PT Plan    PT Frequency  1x/week  (Pended)   -NORY     Predicted Duration of Therapy Intervention (Therapy Eval)  3-4 visits  (Pended)   -NORY      "PT Plan Comments  Cont with HEP. Dr Perrin next week.   (Pended)   -NORY       User Key  (r) = Recorded By, (t) = Taken By, (c) = Cosigned By    Initials Name Provider Type    NORY Ethan Scales ATC             OP Exercises     Row Name 09/06/19 1100             Subjective Comments    Subjective Comments  Reports doing ok. Cont L knee pain, no new complaints  (Pended)   -NORY         Subjective Pain    Able to rate subjective pain?  yes  (Pended)   -NORY      Pre-Treatment Pain Level  4  (Pended)   -NORY      Post-Treatment Pain Level  2  (Pended)   -NORY         Exercise 1    Exercise Name 1  Pro II, level 3  (Pended)   -NORY      Time 1  10  (Pended)   -NORY      Additional Comments  L3  (Pended)   -NORY         Exercise 2    Exercise Name 2  fwd lunge S  (Pended)   -NORY      Sets 2  1  (Pended)   -NORY      Reps 2  10  (Pended)   -NORY      Time 2  5\" hold  (Pended)   -NORY         Exercise 3    Exercise Name 3  standing B gastroc S w/ heels dangling off 4\" step  (Pended)   -NORY      Sets 3  1  (Pended)   -NORY      Reps 3  3  (Pended)   -NORY      Time 3  30\" hold  (Pended)   -NORY         Exercise 4    Exercise Name 4  L standing HS S  (Pended)   -NORY      Sets 4  1  (Pended)   -NORY      Reps 4  3  (Pended)   -NORY      Time 4  30\" hold  (Pended)   -NORY         Exercise 5    Exercise Name 5  F/L step ups  (Pended)   -NORY      Sets 5  2  (Pended)   -NORY      Reps 5  10  (Pended)   -NORY      Additional Comments  6\"  (Pended)   -NORY         Exercise 6    Exercise Name 6  Mini squat  (Pended)   -NORY      Sets 6  2  (Pended)   -NORY      Reps 6  10  (Pended)   -ONRY         Exercise 7    Exercise Name 7  Heel Raise  (Pended)   -NORY      Sets 7  2  (Pended)   -NORY      Reps 7  10  (Pended)   -NORY      Time 7  --  (Pended)   -NORY         Exercise 8    Exercise Name 8  SLS  (Pended)   -NORY      Sets 8  1  (Pended)   -NORY      Reps 8  5  (Pended)   -NORY      Time 8  15\"  (Pended)   -NORY      Additional Comments  EO/EC  (Pended)   -NORY         Exercise 9    " Exercise Name 9  Seated Ham ball Iso  (Pended)   -NORY      Sets 9  2  (Pended)   -NORY      Reps 9  10  (Pended)   -NORY         Exercise 10    Exercise Name 10  L SLR  (Pended)   -NORY      Sets 10  2  (Pended)   -NORY      Reps 10  10  (Pended)   -NORY         Exercise 11    Exercise Name 11  Measurements  (Pended)   -NORY      Sets 11  --  (Pended)   -NORY      Reps 11  --  (Pended)   -NORY         Exercise 12    Exercise Name 12  Reviewed HEP  (Pended)   -NORY         Exercise 13    Exercise Name 13  --  (Pended)   -NORY        User Key  (r) = Recorded By, (t) = Taken By, (c) = Cosigned By    Initials Name Provider Type    Ethan Barber, ATC                        PT OP Goals     Row Name 09/06/19 1000          PT Short Term Goals    STG Date to Achieve  --  (Pended)  deferred  -        Long Term Goals    LTG Date to Achieve  09/10/19  (Pended)   -     LTG 1  Independent with HEP/self-management.  (Pended)   -     LTG 1 Progress  Met  (Pended)   -       User Key  (r) = Recorded By, (t) = Taken By, (c) = Cosigned By    Initials Name Provider Type    Ethan Barber, ATC           Therapy Education  Given: (P) HEP  Program: (P) Reinforced, New  How Provided: (P) Verbal, Written  Provided to: (P) Patient  Level of Understanding: (P) Verbalized, Demonstrated              Time Calculation:   Start Time: (P) 1055  Stop Time: (P) 1138  Time Calculation (min): (P) 43 min  Total Timed Code Minutes- PT: (P) 43 minute(s)  Therapy Charges for Today     Code Description Service Date Service Provider Modifiers Qty    57128774457 HC PT THER PROC EA 15 MIN 9/6/2019 Ethan Scales, ATC  3                    Ethan Scales ATC  9/6/2019

## 2019-09-09 RX ORDER — TIZANIDINE 4 MG/1
TABLET ORAL
Qty: 120 TABLET | Refills: 0 | Status: SHIPPED | OUTPATIENT
Start: 2019-09-09 | End: 2019-09-22 | Stop reason: SDUPTHER

## 2019-09-18 ENCOUNTER — INFUSION (OUTPATIENT)
Dept: ONCOLOGY | Facility: HOSPITAL | Age: 54
End: 2019-09-18

## 2019-09-18 VITALS
TEMPERATURE: 97.6 F | RESPIRATION RATE: 20 BRPM | SYSTOLIC BLOOD PRESSURE: 100 MMHG | HEART RATE: 62 BPM | DIASTOLIC BLOOD PRESSURE: 63 MMHG

## 2019-09-18 DIAGNOSIS — M81.0 OSTEOPOROSIS, UNSPECIFIED OSTEOPOROSIS TYPE, UNSPECIFIED PATHOLOGICAL FRACTURE PRESENCE: Primary | ICD-10-CM

## 2019-09-18 LAB
CALCIUM SPEC-SCNC: 9.6 MG/DL (ref 8.6–10.5)
MAGNESIUM SERPL-MCNC: 2.5 MG/DL (ref 1.6–2.6)
PHOSPHATE SERPL-MCNC: 3.4 MG/DL (ref 2.5–4.5)

## 2019-09-18 PROCEDURE — 83735 ASSAY OF MAGNESIUM: CPT | Performed by: INTERNAL MEDICINE

## 2019-09-18 PROCEDURE — 36415 COLL VENOUS BLD VENIPUNCTURE: CPT | Performed by: NURSE PRACTITIONER

## 2019-09-18 PROCEDURE — 84100 ASSAY OF PHOSPHORUS: CPT | Performed by: INTERNAL MEDICINE

## 2019-09-18 PROCEDURE — 82310 ASSAY OF CALCIUM: CPT | Performed by: INTERNAL MEDICINE

## 2019-09-18 NOTE — PROGRESS NOTES
Patient alert and oriented x 4.  Patient ambulatory.  Pt reports bilateral femur pain since last Prolia.  Called Tad, order bilateral femur x-ray.  Patient will have x-ray done tomorrow--patient choice.  Reschedule Prolia for next week after x-ray reviewed.  Maria C Graham RN  September 18, 2019  [id number]

## 2019-09-20 ENCOUNTER — HOSPITAL ENCOUNTER (OUTPATIENT)
Dept: GENERAL RADIOLOGY | Facility: HOSPITAL | Age: 54
Discharge: HOME | End: 2019-09-20
Admitting: INTERNAL MEDICINE

## 2019-09-20 DIAGNOSIS — M89.8X5 PAIN IN FEMUR: Primary | ICD-10-CM

## 2019-09-20 PROCEDURE — 73552 X-RAY EXAM OF FEMUR 2/>: CPT

## 2019-09-22 PROCEDURE — 87660 TRICHOMONAS VAGIN DIR PROBE: CPT | Performed by: NURSE PRACTITIONER

## 2019-09-22 PROCEDURE — 87086 URINE CULTURE/COLONY COUNT: CPT | Performed by: NURSE PRACTITIONER

## 2019-09-22 PROCEDURE — 87510 GARDNER VAG DNA DIR PROBE: CPT | Performed by: NURSE PRACTITIONER

## 2019-09-22 PROCEDURE — 87480 CANDIDA DNA DIR PROBE: CPT | Performed by: NURSE PRACTITIONER

## 2019-09-23 DIAGNOSIS — M89.8X5 PAIN IN FEMUR: Primary | ICD-10-CM

## 2019-09-23 DIAGNOSIS — M84.750A ATYPICAL FRACTURE OF FEMUR, INITIAL ENCOUNTER: ICD-10-CM

## 2019-09-24 ENCOUNTER — OFFICE VISIT (OUTPATIENT)
Dept: ORTHOPEDIC SURGERY | Facility: CLINIC | Age: 54
End: 2019-09-24

## 2019-09-24 VITALS — WEIGHT: 158 LBS | BODY MASS INDEX: 26.98 KG/M2 | HEIGHT: 64 IN

## 2019-09-24 DIAGNOSIS — K21.9 GASTROESOPHAGEAL REFLUX DISEASE WITHOUT ESOPHAGITIS: ICD-10-CM

## 2019-09-24 DIAGNOSIS — M25.562 LEFT KNEE PAIN, UNSPECIFIED CHRONICITY: ICD-10-CM

## 2019-09-24 DIAGNOSIS — M17.12 PRIMARY OSTEOARTHRITIS OF LEFT KNEE: Primary | ICD-10-CM

## 2019-09-24 PROCEDURE — 99213 OFFICE O/P EST LOW 20 MIN: CPT | Performed by: ORTHOPAEDIC SURGERY

## 2019-09-24 NOTE — PROGRESS NOTES
"Nerissa Lopez is a 53 y.o. female returns for     Chief Complaint   Patient presents with   • Left Knee - Follow-up       HISTORY OF PRESENT ILLNESS: f/u left knee pain.  Patient completed 4 visits at sports medicine, still having pain with movement and walking.   She cannot take steroids due to bad reactions.  Pain with activity.  Increase activity equals increased pain.  No numbness or tingling.     CONCURRENT MEDICAL HISTORY:    The following portions of the patient's history were reviewed and updated as appropriate: allergies, current medications, past family history, past medical history, past social history, past surgical history and problem list.     ROS  No fevers or chills.  No chest pain or shortness of air.  No GI or  disturbances.    PHYSICAL EXAMINATION:       Ht 162.6 cm (64\")   Wt 71.7 kg (158 lb)   LMP  (LMP Unknown)   BMI 27.12 kg/m²     Physical Exam   Constitutional: She is oriented to person, place, and time. She appears well-developed and well-nourished.   Neurological: She is alert and oriented to person, place, and time.   Psychiatric: She has a normal mood and affect. Her behavior is normal. Judgment and thought content normal.       GAIT:     []  Normal  []  Antalgic    Assistive device: [x]  None  []  Walker     []  Crutches  []  Cane     []  Wheelchair  []  Stretcher    Left Knee Exam     Muscle Strength   The patient has normal left knee strength.    Tenderness   Left knee tenderness location: diffuse.    Range of Motion   Extension: -5   Flexion: 110     Tests   Varus: negative Valgus: negative  Drawer:  Anterior - negative     Posterior - negative    Other   Sensation: normal  Pulse: present  Swelling: none    Comments:  Crepitation with motion  Mild to moderate pain through arc of motion                Study Result     Ordering Provider:  Osito Perrin MD  Ordering Diagnosis/Indication:  Left knee pain, unspecified chronicity     Procedure:  XR KNEE BILATERAL AP " STANDING  Exam Date:  7/30/19     COMPARISON:  Todays X-rays were compared to previous images dated January 23, 2018.     IMPRESSION: AP bilateral standing of the knees with lateral of the left knee shows acceptable position and alignment with no evidence of acute bony abnormality.  Mild medial joint space narrowing is noted in both knees.  Mild arthritic change noted in the patellofemoral compartment of the left knee.  No fracture or dislocation is noted.  No interval change from prior x-ray.        Osito Perrin MD  7/30/19                ASSESSMENT:    Diagnoses and all orders for this visit:    Primary osteoarthritis of left knee    Left knee pain, unspecified chronicity    Gastroesophageal reflux disease without esophagitis          PLAN    We discussed continued strengthening conditioning exercises as tolerated.  Activity as tolerated.  Use of an assistive device as needed.    We discussed a trial of Visco supplementation again.  We also discussed eventual total knee arthroplasty versus unicompartmental arthroplasty.    Valgus stress view when she is ready to make the decision for surgical intervention.    Patient's Body mass index is 27.12 kg/m². BMI is above normal parameters. Recommendations include: exercise counseling and nutrition counseling.      Return for visco-supplementation.    Osito Perrin MD

## 2019-09-25 ENCOUNTER — INFUSION (OUTPATIENT)
Dept: ONCOLOGY | Facility: HOSPITAL | Age: 54
End: 2019-09-25

## 2019-09-25 VITALS
RESPIRATION RATE: 16 BRPM | SYSTOLIC BLOOD PRESSURE: 112 MMHG | TEMPERATURE: 97.2 F | DIASTOLIC BLOOD PRESSURE: 60 MMHG | HEART RATE: 65 BPM

## 2019-09-25 DIAGNOSIS — M81.0 AGE-RELATED OSTEOPOROSIS WITHOUT CURRENT PATHOLOGICAL FRACTURE: Primary | ICD-10-CM

## 2019-09-25 PROCEDURE — 25010000002 DENOSUMAB 60 MG/ML SOLUTION PREFILLED SYRINGE: Performed by: INTERNAL MEDICINE

## 2019-09-25 PROCEDURE — 96372 THER/PROPH/DIAG INJ SC/IM: CPT | Performed by: NURSE PRACTITIONER

## 2019-09-25 RX ADMIN — DENOSUMAB 60 MG: 60 INJECTION SUBCUTANEOUS at 09:26

## 2019-10-07 RX ORDER — TIZANIDINE 4 MG/1
TABLET ORAL
Qty: 120 TABLET | Refills: 0 | Status: SHIPPED | OUTPATIENT
Start: 2019-10-07 | End: 2019-11-05 | Stop reason: SDUPTHER

## 2019-10-08 ENCOUNTER — APPOINTMENT (OUTPATIENT)
Dept: NUCLEAR MEDICINE | Facility: HOSPITAL | Age: 54
End: 2019-10-08

## 2019-10-15 ENCOUNTER — HOSPITAL ENCOUNTER (OUTPATIENT)
Dept: NUCLEAR MEDICINE | Facility: HOSPITAL | Age: 54
Discharge: HOME | End: 2019-10-15

## 2019-10-15 DIAGNOSIS — M89.8X5 PAIN IN FEMUR: ICD-10-CM

## 2019-10-15 DIAGNOSIS — M84.750A ATYPICAL FRACTURE OF FEMUR, INITIAL ENCOUNTER: ICD-10-CM

## 2019-10-15 PROCEDURE — A9503 TC99M MEDRONATE: HCPCS | Performed by: INTERNAL MEDICINE

## 2019-10-15 PROCEDURE — 0 TECHNETIUM MEDRONATE KIT: Performed by: INTERNAL MEDICINE

## 2019-10-15 PROCEDURE — 78300 BONE IMAGING LIMITED AREA: CPT

## 2019-10-15 RX ORDER — TC 99M MEDRONATE 20 MG/10ML
27.2 INJECTION, POWDER, LYOPHILIZED, FOR SOLUTION INTRAVENOUS
Status: COMPLETED | OUTPATIENT
Start: 2019-10-15 | End: 2019-10-15

## 2019-10-15 RX ADMIN — Medication 27.2 MILLICURIE: at 09:30

## 2019-10-17 ENCOUNTER — CLINICAL SUPPORT (OUTPATIENT)
Dept: ORTHOPEDIC SURGERY | Facility: CLINIC | Age: 54
End: 2019-10-17

## 2019-10-17 VITALS — HEIGHT: 64 IN | BODY MASS INDEX: 26.63 KG/M2 | WEIGHT: 156 LBS

## 2019-10-17 DIAGNOSIS — M17.12 PRIMARY OSTEOARTHRITIS OF LEFT KNEE: ICD-10-CM

## 2019-10-17 DIAGNOSIS — M25.562 LEFT KNEE PAIN, UNSPECIFIED CHRONICITY: Primary | ICD-10-CM

## 2019-10-17 PROCEDURE — 20610 DRAIN/INJ JOINT/BURSA W/O US: CPT | Performed by: ORTHOPAEDIC SURGERY

## 2019-10-17 NOTE — PROGRESS NOTES
"Knee Joint Injection      Patient: Nerissa Lopez    YOB: 1965    Chief Complaint   Patient presents with   • Left Knee - Follow-up, Pain   • Injections     synvisc one left knee.        History of Present Illness:  Patient is here for an injection.  No new complaints.    Physical Exam: 54 y.o. female  General Appearance:    Alert, cooperative, in no acute distress                   Vitals:    10/17/19 0803   Weight: 70.8 kg (156 lb)   Height: 162.6 cm (64\")        Patient is alert and oriented, ×3 no acute distress.  Affect is normal respiratory rate is normal unlabored.  Exam and complaints are unchanged.    Procedure:  Large Joint Arthrocentesis: L knee  Date/Time: 10/17/2019 8:00 AM  Consent given by: patient  Site marked: site marked  Timeout: Immediately prior to procedure a time out was called to verify the correct patient, procedure, equipment, support staff and site/side marked as required   Supporting Documentation  Indications: pain   Procedure Details  Location: knee - L knee  Preparation: Patient was prepped and draped in the usual sterile fashion  Needle size: 20 G  Approach: anteromedial  Medications administered: 48 mg hylan 48 MG/6ML  Patient tolerance: patient tolerated the procedure well with no immediate complications          Assessment:    Diagnoses and all orders for this visit:    Left knee pain, unspecified chronicity  -     Large Joint Arthrocentesis: L knee    Primary osteoarthritis of left knee  -     Large Joint Arthrocentesis: L knee        Plan:   Slowly increase activity as tolerated.  Discussed importance of leg strengthening and general conditioning.  Discussed warning signs of injection.  Discussed that purpose of injections was symptom improvement and improved activity.  Also discussed that further treatment options depended on symptoms at followup and length of time of improvement after injections.    Return if symptoms worsen or fail to improve, for " neri.    Osito Perrin MD

## 2019-10-22 DIAGNOSIS — M93.252: Primary | ICD-10-CM

## 2019-11-05 RX ORDER — TIZANIDINE 4 MG/1
TABLET ORAL
Qty: 120 TABLET | Refills: 0 | Status: SHIPPED | OUTPATIENT
Start: 2019-11-05 | End: 2019-11-21 | Stop reason: SDUPTHER

## 2019-11-21 ENCOUNTER — OFFICE VISIT (OUTPATIENT)
Dept: ORTHOPEDIC SURGERY | Facility: CLINIC | Age: 54
End: 2019-11-21

## 2019-11-21 VITALS — WEIGHT: 158.4 LBS | BODY MASS INDEX: 27.04 KG/M2 | HEIGHT: 64 IN

## 2019-11-21 DIAGNOSIS — M70.62 TROCHANTERIC BURSITIS OF BOTH HIPS: ICD-10-CM

## 2019-11-21 DIAGNOSIS — M70.61 TROCHANTERIC BURSITIS OF BOTH HIPS: ICD-10-CM

## 2019-11-21 DIAGNOSIS — K21.9 GASTROESOPHAGEAL REFLUX DISEASE WITHOUT ESOPHAGITIS: ICD-10-CM

## 2019-11-21 DIAGNOSIS — M25.551 BILATERAL HIP PAIN: Primary | ICD-10-CM

## 2019-11-21 DIAGNOSIS — M25.552 BILATERAL HIP PAIN: Primary | ICD-10-CM

## 2019-11-21 PROCEDURE — 99213 OFFICE O/P EST LOW 20 MIN: CPT | Performed by: ORTHOPAEDIC SURGERY

## 2019-11-21 NOTE — PROGRESS NOTES
Nerissa Lopez is a 54 y.o. female   Primary provider:  Marti Richardson APRN       Chief Complaint   Patient presents with   • Left Hip - Pain   • Right Hip - Pain   • Establish Care       HISTORY OF PRESENT ILLNESS: Patient being seen for bilateral hip pain. No known injury. Bilateral femur x-rays and Bone Scan done at .   No specific injury  Pain with activity  Pain lying on left hip  No numbness or tingling  No pain shooting down the leg    Pain   This is a new problem. The current episode started more than 1 month ago. Associated symptoms include joint swelling. Associated symptoms comments: Aching, clicking. The symptoms are aggravated by standing and walking. She has tried ice, heat and rest for the symptoms.        CONCURRENT MEDICAL HISTORY:    Past Medical History:   Diagnosis Date   • Anal fistula    • Anemia    • Astigmatism    • Breast cyst    • Broken heart syndrome 03/02/2016    when mother passed away   • Chronic pain disorder    • Colitis    • Death of family member     Mother passes away.   • Depression    • Generalized anxiety disorder    • GERD (gastroesophageal reflux disease)    • Goiter    • Graves disease    • Heartburn    • Hematuria    • Hyperthyroidism    • Lesion of eyelid     LLL   • Low back pain    • Lumbosacral spondylosis    • Menopausal syndrome    • Myopia    • Osteoporosis    • Pain in back     Lumbar region   • Pain in joint involving left lower leg    • Pain in wrist    • Panic attack    • Presbyopia    • Right upper quadrant pain    • Takotsubo cardiomyopathy    • Thoracic spondylosis        Allergies   Allergen Reactions   • Kenalog [Triamcinolone Acetonide] Palpitations   • Medrol [Methylprednisolone] Palpitations   • Prednisone Palpitations         Current Outpatient Medications:   •  calcium carbonate 1250 MG capsule, Take 1,250 mg by mouth Daily., Disp: , Rfl:   •  carvedilol (COREG) 3.125 MG tablet, Take 3.125 mg by mouth 2 (Two) Times a Day With Meals., Disp: , Rfl:   •   escitalopram (LEXAPRO) 10 MG tablet, Take 1 tablet by mouth Daily., Disp: 90 tablet, Rfl: 1  •  GLUCOSAMINE-CHONDROITIN PO, Take  by mouth., Disp: , Rfl:   •  HYDROcodone-acetaminophen (NORCO) 5-325 MG per tablet, Take 1 tablet by mouth 2 (Two) Times a Day As Needed for Moderate Pain . (Patient taking differently: Take 1 tablet by mouth Every 6 (Six) Hours As Needed for Moderate Pain .), Disp: 30 tablet, Rfl: 0  •  levothyroxine (SYNTHROID) 25 MCG tablet, Take 1 tablet by mouth Daily., Disp: 30 tablet, Rfl: 11  •  LORazepam (ATIVAN) 0.5 MG tablet, Take 1 tablet by mouth Daily As Needed for Anxiety., Disp: 30 tablet, Rfl: 0  •  magnesium oxide (MAGOX) 400 (241.3 Mg) MG tablet tablet, Take 800 mg by mouth Daily., Disp: , Rfl:   •  Multiple Vitamins-Minerals (MULTIVITAMIN PO), Take 1 tablet by mouth daily., Disp: , Rfl:   •  Omega-3 Fatty Acids (FISH OIL CONCENTRATE) 1000 MG capsule, Take 1 capsule by mouth 2 (two) times a day., Disp: , Rfl:   •  tiZANidine (ZANAFLEX) 4 MG tablet, TAKE 1 TABLET BY MOUTH EVERY 6 HOURS AS NEEDED FOR MUSCLE SPASMS, Disp: 120 tablet, Rfl: 0  •  vitamin E 400 UNIT capsule, Take 400 Units by mouth daily., Disp: , Rfl:   •  omeprazole (priLOSEC) 20 MG capsule, TAKE 1 CAPSULE BY MOUTH DAILY, Disp: 90 capsule, Rfl: 0  •  tiZANidine (ZANAFLEX) 4 MG tablet, TAKE 1 TABLET BY MOUTH EVERY 6 HOURS AS NEEDED FOR MUSCLE SPASMS, Disp: 120 tablet, Rfl: 0    Past Surgical History:   Procedure Laterality Date   • BREAST CYST EXCISION     • CARDIAC CATHETERIZATION  03/02/2016    Normal coronaries with no obstructive disease. Nonischemic stress induced cardiomyopathy known as Takotsubo syndrome.   • COLONOSCOPY  01/30/2016    Removal of anal fistula   • CYST REMOVAL Right     right breast   • EYELID CARCINOMA EXCISION  06/15/2015   • HYSTERECTOMY  04/14/2014    Total abdominal hysterectomy, bilateral salpingo-oophorectomy. Menorrhagia and leiomyomatous uterus.   • SKIN BIOPSY  06/29/2014   • TUBAL ABDOMINAL  "LIGATION     • WOUND CLOSURE  07/20/2014    Layer closure of wound.   • WOUND CLOSURE  01/16/2014    Layer closure of wound       Family History   Problem Relation Age of Onset   • Heart disease Mother    • Hyperlipidemia Mother    • Hypertension Mother    • Osteoporosis Mother    • Cancer Mother    • Cancer Father    • Diabetes Sister    • Thyroid disease Sister    • COPD Sister    • Hypertension Sister    • Migraines Sister    • Diabetes Brother    • COPD Brother    • Cancer Maternal Grandmother         Ovarian Cancer   • Breast cancer Other    • Cancer Other    • Other Other         Gall Bladder disease   • Breast cancer Other    • Breast cancer Maternal Aunt        Social History     Socioeconomic History   • Marital status:      Spouse name: Not on file   • Number of children: Not on file   • Years of education: Not on file   • Highest education level: Not on file   Tobacco Use   • Smoking status: Never Smoker   • Smokeless tobacco: Never Used   Substance and Sexual Activity   • Alcohol use: No   • Drug use: No   • Sexual activity: Defer        Review of Systems   Musculoskeletal: Positive for joint swelling.        Stiffness, muscle pain   Psychiatric/Behavioral: The patient is nervous/anxious.    All other systems reviewed and are negative.      PHYSICAL EXAMINATION:       Ht 162.6 cm (64\")   Wt 71.8 kg (158 lb 6.4 oz)   LMP  (LMP Unknown)   BMI 27.19 kg/m²     Physical Exam   Constitutional: She is oriented to person, place, and time. She appears well-developed and well-nourished.   Neurological: She is alert and oriented to person, place, and time.   Psychiatric: She has a normal mood and affect. Her behavior is normal. Judgment and thought content normal.       GAIT:     [x]  Normal  []  Antalgic    Assistive device: [x]  None  []  Walker     []  Crutches  []  Cane     []  Wheelchair  []  Stretcher    Right Hip Exam     Tenderness   The patient is experiencing tenderness in the greater " trochanter.    Range of Motion   The patient has normal right hip ROM.    Muscle Strength   Flexion: 5/5     Tests   Joshua: positive    Other   Erythema: absent  Sensation: normal  Pulse: present      Left Hip Exam     Tenderness   The patient is experiencing tenderness in the greater trochanter.    Range of Motion   The patient has normal left hip ROM.    Muscle Strength   Flexion: 5/5     Tests   Joshua: positive    Other   Erythema: absent  Sensation: normal  Pulse: present                    EXAMINATION:  bone scan, limited     INDICATION:     rule out atypical femur fracture from  bisphosphonates, M89.8X5 Other specified disorders of bone, thigh  M84.750A Atypical femoral fracture, unspecified, initial  encounter for fracture  CLINICAL HISTORY:  COMPARISON EXAMINATIONS:   Left femur 9/20/19, left knee 7/30/19   DOSE:  27 mCi of technetium labeled MDP (I.V.)  TECHNIQUE:  Contiguous scanning of the inferior lumbar spine  through the distal lower extremities     FINDINGS:     Scanning performed of the pelvis and distal lower extremities is  correlated with radiographic examinations as described above.     The scintigraphic activity associated with the pelvis and hips is  unremarkable, as well as the scintigraphic activity associated  with the femoral diaphysis, legs, ankles and feet.     There is focally increased activity involving the left femur,  medial femoral condyle, with a linear configuration. While this  is a technically indeterminate finding, the location and  scintigraphic appearance suggests the possibility of  osteochondritis dissecans.     .  IMPRESSION:  CONCLUSION:  1.  Linear scintigraphic activity associated with the left medial  femoral condyle, suggesting the possibility of osteochondritis  dissecans. Confirmation with MRI suggested.         Electronically signed by:  ALEXANDER Melendez MD  10/16/2019 10:04  AM CDT Workstation: [id number]      Two view right femur and two view left  femur.     HISTORY: Bilateral femoral pain.     AP and lateral views of each femur obtained.     COMPARISON: Left femur June 24, 2017     FINDINGS:   Unremarkable right femur.  Small patellar spur lower pole each patella.  Moderate to marked degenerative narrowing of the medial joint  space of the left knee.  No fracture or dislocation.  No other osseous or articular abnormality.     Pelvic phleboliths.  Small right gluteal calcification.     IMPRESSION:  CONCLUSION:  Unremarkable right femur.  Moderate to marked degenerative narrowing of the medial joint  space of the left knee.  Small patellar spur lower pole each patella.     [id number]     Electronically signed by:  Migue Archer MD  9/20/2019 3:21 PM CDT  Workstation: [email or web address]        ASSESSMENT:    Diagnoses and all orders for this visit:    Bilateral hip pain  -     Ambulatory Referral to Physical Therapy Evaluate and treat    Trochanteric bursitis of both hips  -     Ambulatory Referral to Physical Therapy Evaluate and treat    Gastroesophageal reflux disease without esophagitis  -     Ambulatory Referral to Physical Therapy Evaluate and treat          PLAN    PT:   Rom/str   Activity as tolerated   Modalities   Teach HEP for Troch bursitis.    Continue meloxicam    Activity as tolerated    Discussed xrays pelvis and left hip on return if not improved.        Patient's Body mass index is 27.19 kg/m². BMI is within normal parameters. No follow-up required..      Return in about 6 weeks (around 1/2/2020) for recheck.    Osito Perrin MD

## 2019-11-24 DIAGNOSIS — K21.9 GASTROESOPHAGEAL REFLUX DISEASE WITHOUT ESOPHAGITIS: ICD-10-CM

## 2019-11-25 RX ORDER — OMEPRAZOLE 20 MG/1
20 CAPSULE, DELAYED RELEASE ORAL DAILY
Qty: 90 CAPSULE | Refills: 0 | Status: SHIPPED | OUTPATIENT
Start: 2019-11-25 | End: 2020-02-21

## 2019-11-25 RX ORDER — TIZANIDINE 4 MG/1
TABLET ORAL
Qty: 120 TABLET | Refills: 0 | Status: SHIPPED | OUTPATIENT
Start: 2019-11-25 | End: 2019-12-31

## 2019-12-04 ENCOUNTER — OFFICE VISIT (OUTPATIENT)
Dept: ENDOCRINOLOGY | Facility: CLINIC | Age: 54
End: 2019-12-04

## 2019-12-04 ENCOUNTER — APPOINTMENT (OUTPATIENT)
Dept: LAB | Facility: HOSPITAL | Age: 54
End: 2019-12-04

## 2019-12-04 VITALS
HEART RATE: 67 BPM | WEIGHT: 160.7 LBS | BODY MASS INDEX: 27.43 KG/M2 | OXYGEN SATURATION: 96 % | HEIGHT: 64 IN | SYSTOLIC BLOOD PRESSURE: 166 MMHG | DIASTOLIC BLOOD PRESSURE: 78 MMHG

## 2019-12-04 DIAGNOSIS — M81.0 OSTEOPOROSIS, UNSPECIFIED OSTEOPOROSIS TYPE, UNSPECIFIED PATHOLOGICAL FRACTURE PRESENCE: ICD-10-CM

## 2019-12-04 DIAGNOSIS — E89.0 POSTABLATIVE HYPOTHYROIDISM: Primary | ICD-10-CM

## 2019-12-04 LAB
25(OH)D3 SERPL-MCNC: 38.7 NG/ML (ref 30–100)
ALBUMIN SERPL-MCNC: 4.2 G/DL (ref 3.5–5.2)
ALBUMIN/GLOB SERPL: 1.4 G/DL
ALP SERPL-CCNC: 31 U/L (ref 39–117)
ALT SERPL W P-5'-P-CCNC: 11 U/L (ref 1–33)
ANION GAP SERPL CALCULATED.3IONS-SCNC: 9.5 MMOL/L (ref 5–15)
AST SERPL-CCNC: 18 U/L (ref 1–32)
BASOPHILS # BLD AUTO: 0.06 10*3/MM3 (ref 0–0.2)
BASOPHILS NFR BLD AUTO: 1.3 % (ref 0–1.5)
BILIRUB SERPL-MCNC: 0.4 MG/DL (ref 0.2–1.2)
BUN BLD-MCNC: 11 MG/DL (ref 6–20)
BUN/CREAT SERPL: 15.7 (ref 7–25)
CALCIUM SPEC-SCNC: 8.8 MG/DL (ref 8.6–10.5)
CHLORIDE SERPL-SCNC: 104 MMOL/L (ref 98–107)
CO2 SERPL-SCNC: 26.5 MMOL/L (ref 22–29)
CREAT BLD-MCNC: 0.7 MG/DL (ref 0.57–1)
DEPRECATED RDW RBC AUTO: 41.3 FL (ref 37–54)
EOSINOPHIL # BLD AUTO: 0.31 10*3/MM3 (ref 0–0.4)
EOSINOPHIL NFR BLD AUTO: 6.8 % (ref 0.3–6.2)
ERYTHROCYTE [DISTWIDTH] IN BLOOD BY AUTOMATED COUNT: 12.3 % (ref 12.3–15.4)
GFR SERPL CREATININE-BSD FRML MDRD: 88 ML/MIN/1.73
GLOBULIN UR ELPH-MCNC: 3.1 GM/DL
GLUCOSE BLD-MCNC: 80 MG/DL (ref 65–99)
HCT VFR BLD AUTO: 35.7 % (ref 34–46.6)
HGB BLD-MCNC: 12.3 G/DL (ref 12–15.9)
IMM GRANULOCYTES # BLD AUTO: 0.01 10*3/MM3 (ref 0–0.05)
IMM GRANULOCYTES NFR BLD AUTO: 0.2 % (ref 0–0.5)
LYMPHOCYTES # BLD AUTO: 1.62 10*3/MM3 (ref 0.7–3.1)
LYMPHOCYTES NFR BLD AUTO: 35.3 % (ref 19.6–45.3)
MCH RBC QN AUTO: 31.9 PG (ref 26.6–33)
MCHC RBC AUTO-ENTMCNC: 34.5 G/DL (ref 31.5–35.7)
MCV RBC AUTO: 92.7 FL (ref 79–97)
MONOCYTES # BLD AUTO: 0.27 10*3/MM3 (ref 0.1–0.9)
MONOCYTES NFR BLD AUTO: 5.9 % (ref 5–12)
NEUTROPHILS # BLD AUTO: 2.32 10*3/MM3 (ref 1.7–7)
NEUTROPHILS NFR BLD AUTO: 50.5 % (ref 42.7–76)
NRBC BLD AUTO-RTO: 0 /100 WBC (ref 0–0.2)
PLATELET # BLD AUTO: 326 10*3/MM3 (ref 140–450)
PMV BLD AUTO: 10 FL (ref 6–12)
POTASSIUM BLD-SCNC: 5.3 MMOL/L (ref 3.5–5.2)
PROT SERPL-MCNC: 7.3 G/DL (ref 6–8.5)
RBC # BLD AUTO: 3.85 10*6/MM3 (ref 3.77–5.28)
SODIUM BLD-SCNC: 140 MMOL/L (ref 136–145)
T4 FREE SERPL-MCNC: 1.08 NG/DL (ref 0.93–1.7)
TSH SERPL DL<=0.05 MIU/L-ACNC: 1.93 UIU/ML (ref 0.27–4.2)
VIT B12 BLD-MCNC: 863 PG/ML (ref 211–946)
WBC NRBC COR # BLD: 4.59 10*3/MM3 (ref 3.4–10.8)

## 2019-12-04 PROCEDURE — 82306 VITAMIN D 25 HYDROXY: CPT | Performed by: INTERNAL MEDICINE

## 2019-12-04 PROCEDURE — 36415 COLL VENOUS BLD VENIPUNCTURE: CPT | Performed by: INTERNAL MEDICINE

## 2019-12-04 PROCEDURE — 84439 ASSAY OF FREE THYROXINE: CPT | Performed by: INTERNAL MEDICINE

## 2019-12-04 PROCEDURE — 80050 GENERAL HEALTH PANEL: CPT | Performed by: INTERNAL MEDICINE

## 2019-12-04 PROCEDURE — 99213 OFFICE O/P EST LOW 20 MIN: CPT | Performed by: NURSE PRACTITIONER

## 2019-12-04 PROCEDURE — 82607 VITAMIN B-12: CPT | Performed by: INTERNAL MEDICINE

## 2019-12-04 NOTE — PROGRESS NOTES
Elizabeth Lopez is a 54 y.o. female. she is here for follow up            Primary Care/Referring Provider  Raiza Saini MD   follow up      reason - subclinical hyperthyroidism and osteoporosis     from 2-15 neg tsi and tpo ab and toxic subcm MNG , most likely still Graves'     She had MADERA ablation Sept 7, 2018     Pattern suggests central hypothyroidism       Thyroid US      bilateral sucm complex thyroid cysts  maximal dimension 5 mm      US dimensions - within normal limtis     personally reviewed from July 201 4 and stable when compared to July 2016     ==============     symptoms - anxiety       Component      Latest Ref Rng & Units 7/24/2018 10/4/2018 11/20/2018   TSH Baseline      0.460 - 4.680 mIU/mL 0.230 (L) 0.040 (L) 0.190 (L)   Free T4      0.78 - 2.19 ng/dL 0.90 1.19 0.74 (L)   T3, Free      2.0 - 4.4 pg/mL  4.0 3.1   Thyroid Stimulating Immunoglobulin      0.00 - 0.55 IU/L   <0.10               Evaluation history:  TSH   Date Value Ref Range Status   06/28/2019 1.500 0.270 - 4.200 mIU/mL Final     Free T4   Date Value Ref Range Status   06/28/2019 1.02 0.93 - 1.70 ng/dL Final     T3, Free   Date Value Ref Range Status   11/20/2018 3.1 2.0 - 4.4 pg/mL Final       Current medications:  Current Outpatient Medications   Medication Sig Dispense Refill   • calcium carbonate 1250 MG capsule Take 1,250 mg by mouth Daily.     • carvedilol (COREG) 3.125 MG tablet Take 3.125 mg by mouth 2 (Two) Times a Day With Meals.     • escitalopram (LEXAPRO) 10 MG tablet Take 1 tablet by mouth Daily. 90 tablet 1   • GLUCOSAMINE-CHONDROITIN PO Take  by mouth.     • HYDROcodone-acetaminophen (NORCO) 5-325 MG per tablet Take 1 tablet by mouth 2 (Two) Times a Day As Needed for Moderate Pain . (Patient taking differently: Take 1 tablet by mouth Every 6 (Six) Hours As Needed for Moderate Pain .) 30 tablet 0   • levothyroxine (SYNTHROID) 25 MCG tablet Take 1 tablet by mouth Daily. 30 tablet 11   • LORazepam  (ATIVAN) 0.5 MG tablet Take 1 tablet by mouth Daily As Needed for Anxiety. 30 tablet 0   • magnesium oxide (MAGOX) 400 (241.3 Mg) MG tablet tablet Take 800 mg by mouth Daily.     • Multiple Vitamins-Minerals (MULTIVITAMIN PO) Take 1 tablet by mouth daily.     • Omega-3 Fatty Acids (FISH OIL CONCENTRATE) 1000 MG capsule Take 1 capsule by mouth 2 (two) times a day.     • omeprazole (priLOSEC) 20 MG capsule TAKE 1 CAPSULE BY MOUTH DAILY 90 capsule 0   • tiZANidine (ZANAFLEX) 4 MG tablet TAKE 1 TABLET BY MOUTH EVERY 6 HOURS AS NEEDED FOR MUSCLE SPASMS 120 tablet 0   • tiZANidine (ZANAFLEX) 4 MG tablet TAKE 1 TABLET BY MOUTH EVERY 6 HOURS AS NEEDED FOR MUSCLE SPASMS 120 tablet 0   • vitamin E 400 UNIT capsule Take 400 Units by mouth daily.       No current facility-administered medications for this visit.        The following portions of the patient's history were reviewed and updated as appropriate:   Past Medical History:   Diagnosis Date   • Anal fistula    • Anemia    • Astigmatism    • Breast cyst    • Broken heart syndrome 03/02/2016    when mother passed away   • Chronic pain disorder    • Colitis    • Death of family member     Mother passes away.   • Depression    • Generalized anxiety disorder    • GERD (gastroesophageal reflux disease)    • Goiter    • Graves disease    • Heartburn    • Hematuria    • Hyperthyroidism    • Lesion of eyelid     LLL   • Low back pain    • Lumbosacral spondylosis    • Menopausal syndrome    • Myopia    • Osteoporosis    • Pain in back     Lumbar region   • Pain in joint involving left lower leg    • Pain in wrist    • Panic attack    • Presbyopia    • Right upper quadrant pain    • Takotsubo cardiomyopathy    • Thoracic spondylosis      Past Surgical History:   Procedure Laterality Date   • BREAST CYST EXCISION     • CARDIAC CATHETERIZATION  03/02/2016    Normal coronaries with no obstructive disease. Nonischemic stress induced cardiomyopathy known as Takotsubo syndrome.   •  COLONOSCOPY  2016    Removal of anal fistula   • CYST REMOVAL Right     right breast   • EYELID CARCINOMA EXCISION  06/15/2015   • HYSTERECTOMY  2014    Total abdominal hysterectomy, bilateral salpingo-oophorectomy. Menorrhagia and leiomyomatous uterus.   • SKIN BIOPSY  2014   • TUBAL ABDOMINAL LIGATION     • WOUND CLOSURE  2014    Layer closure of wound.   • WOUND CLOSURE  2014    Layer closure of wound     Family History   Problem Relation Age of Onset   • Heart disease Mother    • Hyperlipidemia Mother    • Hypertension Mother    • Osteoporosis Mother    • Cancer Mother    • Cancer Father    • Diabetes Sister    • Thyroid disease Sister    • COPD Sister    • Hypertension Sister    • Migraines Sister    • Diabetes Brother    • COPD Brother    • Cancer Maternal Grandmother         Ovarian Cancer   • Breast cancer Other    • Cancer Other    • Other Other         Gall Bladder disease   • Breast cancer Other    • Breast cancer Maternal Aunt      OB History      Para Term  AB Living    3 3 3          SAB TAB Ectopic Molar Multiple Live Births                       Allergies   Allergen Reactions   • Kenalog [Triamcinolone Acetonide] Palpitations   • Medrol [Methylprednisolone] Palpitations   • Prednisone Palpitations     Social History     Socioeconomic History   • Marital status:      Spouse name: Not on file   • Number of children: Not on file   • Years of education: Not on file   • Highest education level: Not on file   Tobacco Use   • Smoking status: Never Smoker   • Smokeless tobacco: Never Used   Substance and Sexual Activity   • Alcohol use: No   • Drug use: No   • Sexual activity: Defer       Review of Systems  Review of Systems   Constitutional: Positive for fatigue and unexpected weight change. Negative for activity change, appetite change and diaphoresis.   HENT: Negative for facial swelling, sneezing, sore throat, tinnitus, trouble swallowing and voice  "change.    Eyes: Negative for photophobia, pain, discharge, redness, itching and visual disturbance.   Respiratory: Negative for apnea, cough, choking, chest tightness and shortness of breath.    Cardiovascular: Negative for chest pain and leg swelling.   Gastrointestinal: Negative for abdominal distention, abdominal pain, constipation, diarrhea, nausea and vomiting.   Endocrine: Negative for cold intolerance, heat intolerance, polydipsia, polyphagia and polyuria.   Genitourinary: Negative for difficulty urinating, dysuria, frequency, hematuria and urgency.   Musculoskeletal: Negative for arthralgias, back pain, gait problem, joint swelling, myalgias, neck pain and neck stiffness.   Skin: Negative for color change, pallor, rash and wound.   Neurological: Negative for dizziness, tremors, weakness, light-headedness, numbness and headaches.   Hematological: Negative for adenopathy. Does not bruise/bleed easily.   Psychiatric/Behavioral: Negative for behavioral problems, confusion and sleep disturbance.        Objective    /78   Pulse 67   Ht 162.6 cm (64\")   Wt 72.9 kg (160 lb 11.2 oz)   LMP  (LMP Unknown)   SpO2 96%   BMI 27.58 kg/m²   Physical Exam   Constitutional: She is oriented to person, place, and time. She appears well-developed and well-nourished. No distress.   HENT:   Head: Normocephalic and atraumatic.   Right Ear: External ear normal.   Left Ear: External ear normal.   Nose: Nose normal.   Eyes: Conjunctivae and EOM are normal. Pupils are equal, round, and reactive to light.   Neck: Normal range of motion. Neck supple. No tracheal deviation present. No thyromegaly present.   Cardiovascular: Normal rate, regular rhythm and normal heart sounds.   No murmur heard.  Pulmonary/Chest: Effort normal and breath sounds normal. No respiratory distress. She has no wheezes.   Abdominal: Soft. Bowel sounds are normal. There is no tenderness. There is no rebound and no guarding.   Musculoskeletal: Normal " range of motion. She exhibits no edema, tenderness or deformity.   Neurological: She is alert and oriented to person, place, and time. No cranial nerve deficit.   Skin: Skin is warm and dry. No rash noted.   Psychiatric: She has a normal mood and affect. Her behavior is normal. Judgment and thought content normal.       Lab Review  Lab Results   Component Value Date    TSH 1.500 06/28/2019     Lab Results   Component Value Date    FREET4 1.02 06/28/2019        Assessment/Plan       Diagnosis Plan   1. Postablative hypothyroidism     2. Osteoporosis, unspecified osteoporosis type, unspecified pathological fracture presence             Medications prescribed:  Outpatient Encounter Medications as of 12/4/2019   Medication Sig Dispense Refill   • calcium carbonate 1250 MG capsule Take 1,250 mg by mouth Daily.     • carvedilol (COREG) 3.125 MG tablet Take 3.125 mg by mouth 2 (Two) Times a Day With Meals.     • escitalopram (LEXAPRO) 10 MG tablet Take 1 tablet by mouth Daily. 90 tablet 1   • GLUCOSAMINE-CHONDROITIN PO Take  by mouth.     • HYDROcodone-acetaminophen (NORCO) 5-325 MG per tablet Take 1 tablet by mouth 2 (Two) Times a Day As Needed for Moderate Pain . (Patient taking differently: Take 1 tablet by mouth Every 6 (Six) Hours As Needed for Moderate Pain .) 30 tablet 0   • levothyroxine (SYNTHROID) 25 MCG tablet Take 1 tablet by mouth Daily. 30 tablet 11   • LORazepam (ATIVAN) 0.5 MG tablet Take 1 tablet by mouth Daily As Needed for Anxiety. 30 tablet 0   • magnesium oxide (MAGOX) 400 (241.3 Mg) MG tablet tablet Take 800 mg by mouth Daily.     • Multiple Vitamins-Minerals (MULTIVITAMIN PO) Take 1 tablet by mouth daily.     • Omega-3 Fatty Acids (FISH OIL CONCENTRATE) 1000 MG capsule Take 1 capsule by mouth 2 (two) times a day.     • omeprazole (priLOSEC) 20 MG capsule TAKE 1 CAPSULE BY MOUTH DAILY 90 capsule 0   • tiZANidine (ZANAFLEX) 4 MG tablet TAKE 1 TABLET BY MOUTH EVERY 6 HOURS AS NEEDED FOR MUSCLE SPASMS  120 tablet 0   • tiZANidine (ZANAFLEX) 4 MG tablet TAKE 1 TABLET BY MOUTH EVERY 6 HOURS AS NEEDED FOR MUSCLE SPASMS 120 tablet 0   • vitamin E 400 UNIT capsule Take 400 Units by mouth daily.       No facility-administered encounter medications on file as of 12/4/2019.      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 documented as back as 2014 and personally reviewed   Report from 2016 , compared to 2014 no change       She had MADERA ablation on Sept 7, 2018     Low Free T4, nl TSH x 2 , retry levothyroxine 25 mcgs and working     Measure other pituitary hormones - normal ( FSH elevated, prolactin nl , HAMA neg )         Osteoporosis     March 2016     Total Lumbar spine- 0.692 gm/cm2 T-Score -3.2      4 2017, repeat shows improvement on forteo   Next April 2019        Completed 2 years of forteo , started 27 of June 2016     Now on prolia       We were doing 50 th weekly but now   calcium + D once daily       Lab Results   Component Value Date    KJUX00KG 57.3 11/20/2018    [id number] 60.2 10/04/2018    [id number] 54.4 07/24/2018          PTH and calcium- nl    We discussed about risk of ONJ and atypical femur fractures      Labs today         Lab Results   Component Value Date    CHOL 191 11/20/2018    CHLPL 171 03/31/2016    TRIG 68 11/20/2018    HDL 72 11/20/2018    LDL 95 11/20/2018     Off statins         Return in about 6 months (around 6/4/2020), or if symptoms worsen or fail to improve, for Recheck.          This document has been electronically signed by YOANA Callejas on December 4, 2019 9:19 AM

## 2019-12-06 ENCOUNTER — TELEPHONE (OUTPATIENT)
Dept: ENDOCRINOLOGY | Facility: CLINIC | Age: 54
End: 2019-12-06

## 2019-12-10 ENCOUNTER — APPOINTMENT (OUTPATIENT)
Dept: LAB | Facility: HOSPITAL | Age: 54
End: 2019-12-10

## 2019-12-10 ENCOUNTER — OFFICE VISIT (OUTPATIENT)
Dept: FAMILY MEDICINE CLINIC | Facility: CLINIC | Age: 54
End: 2019-12-10

## 2019-12-10 VITALS
SYSTOLIC BLOOD PRESSURE: 102 MMHG | WEIGHT: 158 LBS | BODY MASS INDEX: 26.98 KG/M2 | HEIGHT: 64 IN | DIASTOLIC BLOOD PRESSURE: 64 MMHG

## 2019-12-10 DIAGNOSIS — M54.50 LUMBAR BACK PAIN: ICD-10-CM

## 2019-12-10 DIAGNOSIS — F41.1 GENERALIZED ANXIETY DISORDER: Primary | ICD-10-CM

## 2019-12-10 DIAGNOSIS — E89.0 POSTABLATIVE HYPOTHYROIDISM: ICD-10-CM

## 2019-12-10 DIAGNOSIS — Z79.899 HIGH RISK MEDICATION USE: ICD-10-CM

## 2019-12-10 DIAGNOSIS — Z13.220 SCREENING FOR HYPERCHOLESTEROLEMIA: ICD-10-CM

## 2019-12-10 LAB
AMPHET+METHAMPHET UR QL: NEGATIVE
AMPHETAMINES UR QL: NEGATIVE
BARBITURATES UR QL SCN: NEGATIVE
BENZODIAZ UR QL SCN: NEGATIVE
BUPRENORPHINE SERPL-MCNC: NEGATIVE NG/ML
CANNABINOIDS SERPL QL: NEGATIVE
COCAINE UR QL: NEGATIVE
METHADONE UR QL SCN: NEGATIVE
OPIATES UR QL: POSITIVE
OXYCODONE UR QL SCN: NEGATIVE
PCP UR QL SCN: NEGATIVE
PROPOXYPH UR QL: NEGATIVE
TRICYCLICS UR QL SCN: NEGATIVE

## 2019-12-10 PROCEDURE — 80307 DRUG TEST PRSMV CHEM ANLYZR: CPT | Performed by: NURSE PRACTITIONER

## 2019-12-10 PROCEDURE — 99213 OFFICE O/P EST LOW 20 MIN: CPT | Performed by: NURSE PRACTITIONER

## 2019-12-10 RX ORDER — MELOXICAM 15 MG/1
15 TABLET ORAL DAILY
Refills: 2 | COMMUNITY
Start: 2019-12-05 | End: 2020-04-24

## 2019-12-10 RX ORDER — LORAZEPAM 0.5 MG/1
0.5 TABLET ORAL DAILY PRN
Qty: 30 TABLET | Refills: 0 | Status: SHIPPED | OUTPATIENT
Start: 2019-12-10 | End: 2019-12-12 | Stop reason: SDUPTHER

## 2019-12-10 NOTE — PROGRESS NOTES
"Elizabeth Lopez is a 54 y.o. female.  Follow-up for anxiety, post ablative hypothyroidism and chronic back pain.  Reports anxiety and depression have been much improved on Lexapro.  \"The holidays tend to make me a little sciatica to have a lot of people low better gone but I am doing better.\"    Anxiety   Presents for follow-up visit. Patient reports no compulsions, confusion, depressed mood, excessive worry, feeling of choking, hyperventilation, malaise, muscle tension, obsessions, panic or restlessness. Symptoms occur occasionally. The severity of symptoms is mild. The quality of sleep is fair. Nighttime awakenings: several.     Compliance with medications is %.   Thyroid Problem   Presents for follow-up visit. Patient reports no depressed mood. The symptoms have been stable.   Back Pain   This is a chronic problem. The current episode started more than 1 year ago. The problem occurs constantly. The problem has been gradually worsening since onset. The pain is present in the lumbar spine and thoracic spine. The pain radiates to the left foot. She has tried muscle relaxant, NSAIDs and analgesics for the symptoms. The treatment provided moderate relief.        The following portions of the patient's history were reviewed and updated as appropriate:     Current Outpatient Medications   Medication Sig Dispense Refill   • calcium carbonate 1250 MG capsule Take 1,250 mg by mouth Daily.     • carvedilol (COREG) 3.125 MG tablet Take 3.125 mg by mouth 2 (Two) Times a Day With Meals.     • escitalopram (LEXAPRO) 10 MG tablet Take 1 tablet by mouth Daily. 90 tablet 1   • GLUCOSAMINE-CHONDROITIN PO Take  by mouth.     • HYDROcodone-acetaminophen (NORCO) 5-325 MG per tablet Take 1 tablet by mouth 2 (Two) Times a Day As Needed for Moderate Pain . (Patient taking differently: Take 1 tablet by mouth Every 6 (Six) Hours As Needed for Moderate Pain .) 30 tablet 0   • levothyroxine (SYNTHROID) 25 MCG tablet Take 1 " tablet by mouth Daily. 30 tablet 11   • LORazepam (ATIVAN) 0.5 MG tablet Take 1 tablet by mouth Daily As Needed for Anxiety. 30 tablet 0   • magnesium oxide (MAGOX) 400 (241.3 Mg) MG tablet tablet Take 800 mg by mouth Daily.     • meloxicam (MOBIC) 15 MG tablet Take 15 mg by mouth Daily.  2   • Multiple Vitamins-Minerals (MULTIVITAMIN PO) Take 1 tablet by mouth daily.     • Omega-3 Fatty Acids (FISH OIL CONCENTRATE) 1000 MG capsule Take 1 capsule by mouth 2 (two) times a day.     • omeprazole (priLOSEC) 20 MG capsule TAKE 1 CAPSULE BY MOUTH DAILY 90 capsule 0   • tiZANidine (ZANAFLEX) 4 MG tablet TAKE 1 TABLET BY MOUTH EVERY 6 HOURS AS NEEDED FOR MUSCLE SPASMS 120 tablet 0   • vitamin E 400 UNIT capsule Take 400 Units by mouth daily.     • tiZANidine (ZANAFLEX) 4 MG tablet TAKE 1 TABLET BY MOUTH EVERY 6 HOURS AS NEEDED FOR MUSCLE SPASMS 120 tablet 0     No current facility-administered medications for this visit.      Current Outpatient Medications on File Prior to Visit   Medication Sig   • calcium carbonate 1250 MG capsule Take 1,250 mg by mouth Daily.   • carvedilol (COREG) 3.125 MG tablet Take 3.125 mg by mouth 2 (Two) Times a Day With Meals.   • escitalopram (LEXAPRO) 10 MG tablet Take 1 tablet by mouth Daily.   • GLUCOSAMINE-CHONDROITIN PO Take  by mouth.   • HYDROcodone-acetaminophen (NORCO) 5-325 MG per tablet Take 1 tablet by mouth 2 (Two) Times a Day As Needed for Moderate Pain . (Patient taking differently: Take 1 tablet by mouth Every 6 (Six) Hours As Needed for Moderate Pain .)   • levothyroxine (SYNTHROID) 25 MCG tablet Take 1 tablet by mouth Daily.   • magnesium oxide (MAGOX) 400 (241.3 Mg) MG tablet tablet Take 800 mg by mouth Daily.   • meloxicam (MOBIC) 15 MG tablet Take 15 mg by mouth Daily.   • Multiple Vitamins-Minerals (MULTIVITAMIN PO) Take 1 tablet by mouth daily.   • Omega-3 Fatty Acids (FISH OIL CONCENTRATE) 1000 MG capsule Take 1 capsule by mouth 2 (two) times a day.   • omeprazole  "(priLOSEC) 20 MG capsule TAKE 1 CAPSULE BY MOUTH DAILY   • tiZANidine (ZANAFLEX) 4 MG tablet TAKE 1 TABLET BY MOUTH EVERY 6 HOURS AS NEEDED FOR MUSCLE SPASMS   • vitamin E 400 UNIT capsule Take 400 Units by mouth daily.   • [DISCONTINUED] LORazepam (ATIVAN) 0.5 MG tablet Take 1 tablet by mouth Daily As Needed for Anxiety.   • tiZANidine (ZANAFLEX) 4 MG tablet TAKE 1 TABLET BY MOUTH EVERY 6 HOURS AS NEEDED FOR MUSCLE SPASMS     No current facility-administered medications on file prior to visit.      She is allergic to kenalog [triamcinolone acetonide]; medrol [methylprednisolone]; and prednisone..    Review of Systems   Constitutional: Negative.    HENT: Negative.    Respiratory: Negative.    Cardiovascular: Negative.    Gastrointestinal: Negative.    Genitourinary: Negative.    Musculoskeletal: Positive for back pain.   Skin: Negative.    Neurological: Negative.    Psychiatric/Behavioral: Negative.  Negative for confusion.       Objective    Visit Vitals  /64   Ht 162.6 cm (64\")   Wt 71.7 kg (158 lb)   LMP  (LMP Unknown)   BMI 27.12 kg/m²       Physical Exam   Constitutional: She is oriented to person, place, and time. She appears well-developed and well-nourished.   HENT:   Head: Normocephalic.   Right Ear: External ear normal.   Left Ear: External ear normal.   Eyes: Pupils are equal, round, and reactive to light. EOM are normal.   Neck: Normal range of motion. Neck supple.   Cardiovascular: Normal rate, regular rhythm and normal heart sounds.   Pulmonary/Chest: Effort normal and breath sounds normal.   Abdominal: Soft. Bowel sounds are normal.   Musculoskeletal: Normal range of motion.   Neurological: She is alert and oriented to person, place, and time.   Skin: Skin is warm. Capillary refill takes less than 2 seconds.   Psychiatric: She has a normal mood and affect. Her behavior is normal.   Nursing note and vitals reviewed.      Assessment/Plan   Problems Addressed this Visit        Endocrine    " Postablative hypothyroidism       Nervous and Auditory    Lumbar back pain       Other    Generalized anxiety disorder - Primary    Relevant Medications    LORazepam (ATIVAN) 0.5 MG tablet      Other Visit Diagnoses     Screening for hypercholesterolemia        Relevant Orders    Lipid panel    High risk medication use        Relevant Orders    Urine Drug Screen - Urine, Clean Catch        New Medications Ordered This Visit   Medications   • LORazepam (ATIVAN) 0.5 MG tablet     Sig: Take 1 tablet by mouth Daily As Needed for Anxiety.     Dispense:  30 tablet     Refill:  0     1.  Generalized anxiety disorder:  Continue on Ativan as needed  Educated on possible sedative side effects of this medication as well as possible side effects of drowsiness and dependency  Discussed stress relieving technique such as deep breathing, guided imagery    2.  Lumbar back pain:  Continue on Norco as prescribed per pain management  Continue on meloxicam and Zanaflex as previously prescribed  Educated on possible side effects of this medication including but not limited to increased risk for drowsiness, sedation, respiratory depression and dependency  Strongly encouraged not to take Norco prior to working, driving or operating heavy machinery    3.  Post ablative hypothyroidism:  Continue on Synthroid as previously prescribed  Continue endocrinology follow-up as scheduled with YOANA Callejas on June 20, 2020    4.  Screening for hypercholesterolemia:  Complete fasting lipid panel as ordered and will notify results when available  Encouraged adhere to low-fat diet    5.  High-risk medication use:  BRENDAN reviewed by me and will be scanned into medical record  Complete urine drug screen as ordered     Continue on current medications as previously prescribed ]  I spent 23 minutes in direct face to face contact with patient.  Greater than 50% of this time was spent counseling patient and discussing plan of care.  Return in about 6  months (around 6/10/2020) for Annual physical.        This document has been electronically signed by YOANA Arguello on December 10, 2019 12:40 PM

## 2019-12-11 ENCOUNTER — TELEPHONE (OUTPATIENT)
Dept: FAMILY MEDICINE CLINIC | Facility: CLINIC | Age: 54
End: 2019-12-11

## 2019-12-11 NOTE — TELEPHONE ENCOUNTER
Per YOANA Saba, Ms. Lopez  has been called with recent lab results & recommendations.  Continue current medications and follow-up as planned or sooner if any problems.    Marti  Ms. Lopez said she is taking Norco 5/325 mg 1 tablet QID [facility] Dr[facility]      ----- Message from YOANA Arguello sent at 12/11/2019  7:11 AM CST -----  Can you please call and let her know that her drug screen came back positive for opiates.  Opiates can sometimes have false positives that she has been taking something along the lines of Zantac over-the-counter.  Please ask her if she is taking anything like ranitidine or Zantac?

## 2019-12-11 NOTE — PROGRESS NOTES
Per YOANA Saba, Ms. Lopez  has been called with recent lab results & recommendations.  Continue current medications and follow-up as planned or sooner if any problems.    Marti  Ms. Lopez said she is taking Norco 5/325 mg 1 tablet QID from Dr[facility] Bimal [facility]  [facility]

## 2019-12-12 RX ORDER — LORAZEPAM 0.5 MG/1
0.5 TABLET ORAL DAILY PRN
Qty: 30 TABLET | Refills: 0 | Status: SHIPPED | OUTPATIENT
Start: 2019-12-12 | End: 2021-01-28 | Stop reason: SDUPTHER

## 2019-12-31 RX ORDER — TIZANIDINE 4 MG/1
TABLET ORAL
Qty: 120 TABLET | Refills: 0 | Status: SHIPPED | OUTPATIENT
Start: 2019-12-31 | End: 2020-01-27

## 2020-01-03 RX ORDER — LEVOTHYROXINE SODIUM 0.03 MG/1
25 TABLET ORAL DAILY
Qty: 30 TABLET | Refills: 11 | Status: SHIPPED | OUTPATIENT
Start: 2020-01-03 | End: 2020-04-24

## 2020-01-27 RX ORDER — TIZANIDINE 4 MG/1
TABLET ORAL
Qty: 120 TABLET | Refills: 0 | Status: SHIPPED | OUTPATIENT
Start: 2020-01-27 | End: 2020-02-26

## 2020-01-28 ENCOUNTER — LAB (OUTPATIENT)
Dept: LAB | Facility: HOSPITAL | Age: 55
End: 2020-01-28

## 2020-01-28 DIAGNOSIS — Z13.220 SCREENING FOR HYPERCHOLESTEROLEMIA: ICD-10-CM

## 2020-01-28 LAB
CHOLEST SERPL-MCNC: 203 MG/DL (ref 0–200)
HDLC SERPL-MCNC: 72 MG/DL (ref 40–60)
LDLC SERPL CALC-MCNC: 119 MG/DL (ref 0–100)
LDLC/HDLC SERPL: 1.65 {RATIO}
TRIGL SERPL-MCNC: 60 MG/DL (ref 0–150)
VLDLC SERPL-MCNC: 12 MG/DL (ref 5–40)

## 2020-01-28 PROCEDURE — 80061 LIPID PANEL: CPT

## 2020-01-30 ENCOUNTER — TELEPHONE (OUTPATIENT)
Dept: FAMILY MEDICINE CLINIC | Facility: CLINIC | Age: 55
End: 2020-01-30

## 2020-01-30 NOTE — TELEPHONE ENCOUNTER
-Per  YOANA Saba, Ms Lopez has been called with recent lab results & recommendations.  Continue current medications and follow-up as planned or sooner if any problems.      ---- Message from YOANA Arguello sent at 1/30/2020  9:57 AM CST -----  Cholesterol level slightly elevated.  She needs to watch the fried, fatty and greasy foods in the diet.  She can also increase exercises such as walking which will help to reduce these numbers.  We will recheck fasting cholesterol at next appointment.

## 2020-02-01 PROCEDURE — 87480 CANDIDA DNA DIR PROBE: CPT | Performed by: NURSE PRACTITIONER

## 2020-02-01 PROCEDURE — 87510 GARDNER VAG DNA DIR PROBE: CPT | Performed by: NURSE PRACTITIONER

## 2020-02-01 PROCEDURE — 87660 TRICHOMONAS VAGIN DIR PROBE: CPT | Performed by: NURSE PRACTITIONER

## 2020-02-21 DIAGNOSIS — K21.9 GASTROESOPHAGEAL REFLUX DISEASE WITHOUT ESOPHAGITIS: ICD-10-CM

## 2020-02-21 RX ORDER — OMEPRAZOLE 20 MG/1
20 CAPSULE, DELAYED RELEASE ORAL DAILY
Qty: 90 CAPSULE | Refills: 0 | Status: SHIPPED | OUTPATIENT
Start: 2020-02-21 | End: 2020-05-26

## 2020-02-26 RX ORDER — TIZANIDINE 4 MG/1
TABLET ORAL
Qty: 120 TABLET | Refills: 0 | Status: SHIPPED | OUTPATIENT
Start: 2020-02-26 | End: 2020-03-24

## 2020-03-24 RX ORDER — TIZANIDINE 4 MG/1
TABLET ORAL
Qty: 120 TABLET | Refills: 0 | Status: SHIPPED | OUTPATIENT
Start: 2020-03-24 | End: 2020-04-23

## 2020-03-30 ENCOUNTER — OFFICE VISIT (OUTPATIENT)
Dept: FAMILY MEDICINE CLINIC | Facility: CLINIC | Age: 55
End: 2020-03-30

## 2020-03-30 VITALS — BODY MASS INDEX: 26.98 KG/M2 | HEIGHT: 64 IN | WEIGHT: 158 LBS

## 2020-03-30 DIAGNOSIS — J02.9 PHARYNGITIS, UNSPECIFIED ETIOLOGY: Primary | ICD-10-CM

## 2020-03-30 PROCEDURE — 99442 PR PHYS/QHP TELEPHONE EVALUATION 11-20 MIN: CPT | Performed by: NURSE PRACTITIONER

## 2020-03-30 RX ORDER — AMOXICILLIN 500 MG/1
500 CAPSULE ORAL 2 TIMES DAILY
Qty: 20 CAPSULE | Refills: 0 | Status: SHIPPED | OUTPATIENT
Start: 2020-03-30 | End: 2020-04-09

## 2020-03-30 RX ORDER — LIDOCAINE HYDROCHLORIDE 20 MG/ML
5 SOLUTION OROPHARYNGEAL AS NEEDED
Qty: 100 ML | Refills: 1 | OUTPATIENT
Start: 2020-03-30 | End: 2020-07-14

## 2020-03-30 NOTE — PROGRESS NOTES
Elizabeth Lopez is a 54 y.o. female.  1 week ago began complaining of cough with sore throat, bilateral earaches and low-grade fever.  Has been taking Zyrtec and gargling salt water with no relief.    You have chosen to receive care through a telephone visit today. Do you consent to use a telephone visit for your medical care today? Yes    Sore Throat    This is a new problem. The current episode started in the past 7 days. The problem has been gradually worsening. Neither side of throat is experiencing more pain than the other. The maximum temperature recorded prior to her arrival was 100.4 - 100.9 F. The fever has been present for 1 to 2 days. The pain is at a severity of 8/10. The pain is moderate. Associated symptoms include congestion and coughing. She has tried cool liquids and gargles for the symptoms. The treatment provided no relief.        The following portions of the patient's history were reviewed and updated as appropriate:   Current Outpatient Medications   Medication Sig Dispense Refill   • calcium carbonate 1250 MG capsule Take 1,250 mg by mouth Daily.     • carvedilol (COREG) 3.125 MG tablet Take 3.125 mg by mouth 2 (Two) Times a Day With Meals.     • escitalopram (LEXAPRO) 10 MG tablet Take 1 tablet by mouth Daily. 90 tablet 1   • fluconazole (DIFLUCAN) 150 MG tablet Take one now and repeat in 3 days 2 tablet 0   • GLUCOSAMINE-CHONDROITIN PO Take  by mouth.     • HYDROcodone-acetaminophen (NORCO) 5-325 MG per tablet Take 1 tablet by mouth 2 (Two) Times a Day As Needed for Moderate Pain . (Patient taking differently: Take 1 tablet by mouth Every 6 (Six) Hours As Needed for Moderate Pain .) 30 tablet 0   • levothyroxine (SYNTHROID, LEVOTHROID) 25 MCG tablet TAKE 1 TABLET BY MOUTH DAILY 30 tablet 11   • LORazepam (ATIVAN) 0.5 MG tablet Take 1 tablet by mouth Daily As Needed for Anxiety. 30 tablet 0   • magnesium oxide (MAGOX) 400 (241.3 Mg) MG tablet tablet Take 800 mg by mouth Daily.      • meloxicam (MOBIC) 15 MG tablet Take 15 mg by mouth Daily.  2   • Multiple Vitamins-Minerals (MULTIVITAMIN PO) Take 1 tablet by mouth daily.     • Omega-3 Fatty Acids (FISH OIL CONCENTRATE) 1000 MG capsule Take 1 capsule by mouth 2 (two) times a day.     • omeprazole (priLOSEC) 20 MG capsule TAKE 1 CAPSULE BY MOUTH DAILY 90 capsule 0   • tiZANidine (ZANAFLEX) 4 MG tablet TAKE 1 TABLET BY MOUTH EVERY 6 HOURS AS NEEDED FOR MUSCLE SPASMS 120 tablet 0   • vitamin E 400 UNIT capsule Take 400 Units by mouth daily.     • amoxicillin (AMOXIL) 500 MG capsule Take 1 capsule by mouth 2 (Two) Times a Day for 10 days. 20 capsule 0   • Lidocaine Viscous HCl (XYLOCAINE) 2 % solution Take 5 mL by mouth As Needed for Mild Pain . 100 mL 1   • tiZANidine (ZANAFLEX) 4 MG tablet TAKE 1 TABLET BY MOUTH EVERY 6 HOURS AS NEEDED FOR MUSCLE SPASMS 120 tablet 0     No current facility-administered medications for this visit.      Current Outpatient Medications on File Prior to Visit   Medication Sig   • calcium carbonate 1250 MG capsule Take 1,250 mg by mouth Daily.   • carvedilol (COREG) 3.125 MG tablet Take 3.125 mg by mouth 2 (Two) Times a Day With Meals.   • escitalopram (LEXAPRO) 10 MG tablet Take 1 tablet by mouth Daily.   • fluconazole (DIFLUCAN) 150 MG tablet Take one now and repeat in 3 days   • GLUCOSAMINE-CHONDROITIN PO Take  by mouth.   • HYDROcodone-acetaminophen (NORCO) 5-325 MG per tablet Take 1 tablet by mouth 2 (Two) Times a Day As Needed for Moderate Pain . (Patient taking differently: Take 1 tablet by mouth Every 6 (Six) Hours As Needed for Moderate Pain .)   • levothyroxine (SYNTHROID, LEVOTHROID) 25 MCG tablet TAKE 1 TABLET BY MOUTH DAILY   • LORazepam (ATIVAN) 0.5 MG tablet Take 1 tablet by mouth Daily As Needed for Anxiety.   • magnesium oxide (MAGOX) 400 (241.3 Mg) MG tablet tablet Take 800 mg by mouth Daily.   • meloxicam (MOBIC) 15 MG tablet Take 15 mg by mouth Daily.   • Multiple Vitamins-Minerals (MULTIVITAMIN  PO) Take 1 tablet by mouth daily.   • Omega-3 Fatty Acids (FISH OIL CONCENTRATE) 1000 MG capsule Take 1 capsule by mouth 2 (two) times a day.   • omeprazole (priLOSEC) 20 MG capsule TAKE 1 CAPSULE BY MOUTH DAILY   • tiZANidine (ZANAFLEX) 4 MG tablet TAKE 1 TABLET BY MOUTH EVERY 6 HOURS AS NEEDED FOR MUSCLE SPASMS   • vitamin E 400 UNIT capsule Take 400 Units by mouth daily.   • tiZANidine (ZANAFLEX) 4 MG tablet TAKE 1 TABLET BY MOUTH EVERY 6 HOURS AS NEEDED FOR MUSCLE SPASMS     No current facility-administered medications on file prior to visit.      She is allergic to kenalog [triamcinolone acetonide]; medrol [methylprednisolone]; and prednisone..    Review of Systems   Constitutional: Positive for fatigue.   HENT: Positive for congestion, postnasal drip and sore throat.    Eyes: Negative.    Respiratory: Positive for cough.    Cardiovascular: Negative.    Gastrointestinal: Negative.    Genitourinary: Negative.    Musculoskeletal: Negative.    Skin: Negative.    Neurological: Negative.        Objective   Physical Exam   Constitutional: She is oriented to person, place, and time.   Lymphadenopathy:   Patient directed exam-Moderate tenderness to bilateral tonsillar lymph nodes   Neurological: She is alert and oriented to person, place, and time.       Assessment/Plan   Problems Addressed this Visit     None      Visit Diagnoses     Pharyngitis, unspecified etiology    -  Primary    Relevant Medications    amoxicillin (AMOXIL) 500 MG capsule    Lidocaine Viscous HCl (XYLOCAINE) 2 % solution        New Medications Ordered This Visit   Medications   • amoxicillin (AMOXIL) 500 MG capsule     Sig: Take 1 capsule by mouth 2 (Two) Times a Day for 10 days.     Dispense:  20 capsule     Refill:  0   • Lidocaine Viscous HCl (XYLOCAINE) 2 % solution     Sig: Take 5 mL by mouth As Needed for Mild Pain .     Dispense:  100 mL     Refill:  1     1.  Pharyngitis, unspecified etiology:  Begin amoxicillin as prescribed  Educated on  importance of taking full dose of antibiotic therapy  Begin viscous lidocaine gargles every 2 hours as needed for sore throat  Encouraged to continue with warm salt water gargles every 2 hours as needed  Encouraged to discard toothbrush after completion of antibiotics  Continue on Zyrtec but encouraged to take it p.o. nightly instead of p.o. every morning  May use Flonase OTC according to package directions    This visit has been rescheduled as a phone visit to comply with patient safety concerns in accordance with CDC recommendations. Total time of discussion was 13 minutes.    Return if symptoms worsen or fail to improve, for Next scheduled follow up.        This document has been electronically signed by YOANA Arguello on March 30, 2020 15:48

## 2020-03-31 ENCOUNTER — APPOINTMENT (OUTPATIENT)
Dept: ONCOLOGY | Facility: HOSPITAL | Age: 55
End: 2020-03-31

## 2020-03-31 ENCOUNTER — HOSPITAL ENCOUNTER (OUTPATIENT)
Dept: INFUSION THERAPY | Facility: HOSPITAL | Age: 55
Discharge: HOME | End: 2020-03-31

## 2020-03-31 ENCOUNTER — APPOINTMENT (OUTPATIENT)
Dept: INFUSION THERAPY | Facility: HOSPITAL | Age: 55
End: 2020-03-31

## 2020-04-23 RX ORDER — TIZANIDINE 4 MG/1
TABLET ORAL
Qty: 120 TABLET | Refills: 0 | Status: SHIPPED | OUTPATIENT
Start: 2020-04-23 | End: 2020-05-18

## 2020-04-24 RX ORDER — LEVOTHYROXINE SODIUM 0.03 MG/1
25 TABLET ORAL DAILY
Qty: 30 TABLET | Refills: 3 | Status: SHIPPED | OUTPATIENT
Start: 2020-04-24 | End: 2020-07-13

## 2020-04-27 RX ORDER — MELOXICAM 15 MG/1
TABLET ORAL
Qty: 30 TABLET | Refills: 3 | Status: SHIPPED | OUTPATIENT
Start: 2020-04-27 | End: 2021-01-28 | Stop reason: SDUPTHER

## 2020-05-12 ENCOUNTER — APPOINTMENT (OUTPATIENT)
Dept: ONCOLOGY | Facility: HOSPITAL | Age: 55
End: 2020-05-12

## 2020-05-12 ENCOUNTER — TELEPHONE (OUTPATIENT)
Dept: ONCOLOGY | Facility: CLINIC | Age: 55
End: 2020-05-12

## 2020-05-12 NOTE — TELEPHONE ENCOUNTER
Pt called wanting to reschdule appointment on:  05/14   Due to: cant make that date   Please call pt back at: [phone]     Thanks

## 2020-05-14 ENCOUNTER — APPOINTMENT (OUTPATIENT)
Dept: ONCOLOGY | Facility: HOSPITAL | Age: 55
End: 2020-05-14

## 2020-05-18 RX ORDER — TIZANIDINE 4 MG/1
TABLET ORAL
Qty: 120 TABLET | Refills: 0 | Status: SHIPPED | OUTPATIENT
Start: 2020-05-18 | End: 2020-06-15

## 2020-05-21 ENCOUNTER — INFUSION (OUTPATIENT)
Dept: ONCOLOGY | Facility: HOSPITAL | Age: 55
End: 2020-05-21

## 2020-05-21 ENCOUNTER — LAB (OUTPATIENT)
Dept: ONCOLOGY | Facility: HOSPITAL | Age: 55
End: 2020-05-21

## 2020-05-21 VITALS
HEART RATE: 64 BPM | DIASTOLIC BLOOD PRESSURE: 66 MMHG | TEMPERATURE: 98.4 F | RESPIRATION RATE: 16 BRPM | SYSTOLIC BLOOD PRESSURE: 105 MMHG

## 2020-05-21 DIAGNOSIS — M81.0 AGE-RELATED OSTEOPOROSIS WITHOUT CURRENT PATHOLOGICAL FRACTURE: Primary | ICD-10-CM

## 2020-05-21 DIAGNOSIS — M81.0 OSTEOPOROSIS, UNSPECIFIED OSTEOPOROSIS TYPE, UNSPECIFIED PATHOLOGICAL FRACTURE PRESENCE: Primary | ICD-10-CM

## 2020-05-21 LAB
CALCIUM SPEC-SCNC: 9 MG/DL (ref 8.6–10.5)
MAGNESIUM SERPL-MCNC: 2.3 MG/DL (ref 1.6–2.6)
PHOSPHATE SERPL-MCNC: 3.9 MG/DL (ref 2.5–4.5)

## 2020-05-21 PROCEDURE — 25010000002 DENOSUMAB 60 MG/ML SOLUTION PREFILLED SYRINGE: Performed by: INTERNAL MEDICINE

## 2020-05-21 PROCEDURE — 84100 ASSAY OF PHOSPHORUS: CPT | Performed by: INTERNAL MEDICINE

## 2020-05-21 PROCEDURE — 36415 COLL VENOUS BLD VENIPUNCTURE: CPT | Performed by: NURSE PRACTITIONER

## 2020-05-21 PROCEDURE — 82310 ASSAY OF CALCIUM: CPT | Performed by: INTERNAL MEDICINE

## 2020-05-21 PROCEDURE — 96372 THER/PROPH/DIAG INJ SC/IM: CPT | Performed by: NURSE PRACTITIONER

## 2020-05-21 PROCEDURE — 83735 ASSAY OF MAGNESIUM: CPT | Performed by: INTERNAL MEDICINE

## 2020-05-21 RX ADMIN — DENOSUMAB 60 MG: 60 INJECTION SUBCUTANEOUS at 15:55

## 2020-05-23 DIAGNOSIS — K21.9 GASTROESOPHAGEAL REFLUX DISEASE WITHOUT ESOPHAGITIS: ICD-10-CM

## 2020-05-26 RX ORDER — OMEPRAZOLE 20 MG/1
20 CAPSULE, DELAYED RELEASE ORAL DAILY
Qty: 90 CAPSULE | Refills: 0 | Status: SHIPPED | OUTPATIENT
Start: 2020-05-26 | End: 2020-08-24

## 2020-05-29 ENCOUNTER — TELEPHONE (OUTPATIENT)
Dept: ENDOCRINOLOGY | Facility: CLINIC | Age: 55
End: 2020-05-29

## 2020-06-09 DIAGNOSIS — E05.00 GRAVES' DISEASE: ICD-10-CM

## 2020-06-09 DIAGNOSIS — E89.0 POSTABLATIVE HYPOTHYROIDISM: Primary | ICD-10-CM

## 2020-06-09 DIAGNOSIS — E55.9 VITAMIN D DEFICIENCY: ICD-10-CM

## 2020-06-15 RX ORDER — TIZANIDINE 4 MG/1
TABLET ORAL
Qty: 120 TABLET | Refills: 0 | Status: SHIPPED | OUTPATIENT
Start: 2020-06-15 | End: 2020-07-13

## 2020-06-23 ENCOUNTER — LAB (OUTPATIENT)
Dept: LAB | Facility: HOSPITAL | Age: 55
End: 2020-06-23

## 2020-06-23 DIAGNOSIS — E89.0 POSTABLATIVE HYPOTHYROIDISM: ICD-10-CM

## 2020-06-23 DIAGNOSIS — E05.00 GRAVES' DISEASE: ICD-10-CM

## 2020-06-23 DIAGNOSIS — E55.9 VITAMIN D DEFICIENCY: ICD-10-CM

## 2020-06-23 LAB
25(OH)D3 SERPL-MCNC: 44.2 NG/ML (ref 30–100)
ALBUMIN SERPL-MCNC: 4.5 G/DL (ref 3.5–5.2)
ALBUMIN/GLOB SERPL: 1.6 G/DL
ALP SERPL-CCNC: 40 U/L (ref 39–117)
ALT SERPL W P-5'-P-CCNC: 14 U/L (ref 1–33)
ANION GAP SERPL CALCULATED.3IONS-SCNC: 9.4 MMOL/L (ref 5–15)
AST SERPL-CCNC: 22 U/L (ref 1–32)
BASOPHILS # BLD AUTO: 0.05 10*3/MM3 (ref 0–0.2)
BASOPHILS NFR BLD AUTO: 0.8 % (ref 0–1.5)
BILIRUB SERPL-MCNC: 0.4 MG/DL (ref 0.2–1.2)
BUN BLD-MCNC: 12 MG/DL (ref 6–20)
BUN/CREAT SERPL: 16 (ref 7–25)
CALCIUM SPEC-SCNC: 9.2 MG/DL (ref 8.6–10.5)
CHLORIDE SERPL-SCNC: 98 MMOL/L (ref 98–107)
CO2 SERPL-SCNC: 27.6 MMOL/L (ref 22–29)
CREAT BLD-MCNC: 0.75 MG/DL (ref 0.57–1)
DEPRECATED RDW RBC AUTO: 41.7 FL (ref 37–54)
EOSINOPHIL # BLD AUTO: 0.18 10*3/MM3 (ref 0–0.4)
EOSINOPHIL NFR BLD AUTO: 2.9 % (ref 0.3–6.2)
ERYTHROCYTE [DISTWIDTH] IN BLOOD BY AUTOMATED COUNT: 12.3 % (ref 12.3–15.4)
GFR SERPL CREATININE-BSD FRML MDRD: 81 ML/MIN/1.73
GLOBULIN UR ELPH-MCNC: 2.8 GM/DL
GLUCOSE BLD-MCNC: 77 MG/DL (ref 65–99)
HCT VFR BLD AUTO: 36.9 % (ref 34–46.6)
HGB BLD-MCNC: 12.8 G/DL (ref 12–15.9)
IMM GRANULOCYTES # BLD AUTO: 0.01 10*3/MM3 (ref 0–0.05)
IMM GRANULOCYTES NFR BLD AUTO: 0.2 % (ref 0–0.5)
LYMPHOCYTES # BLD AUTO: 2.23 10*3/MM3 (ref 0.7–3.1)
LYMPHOCYTES NFR BLD AUTO: 36.2 % (ref 19.6–45.3)
MCH RBC QN AUTO: 32 PG (ref 26.6–33)
MCHC RBC AUTO-ENTMCNC: 34.7 G/DL (ref 31.5–35.7)
MCV RBC AUTO: 92.3 FL (ref 79–97)
MONOCYTES # BLD AUTO: 0.42 10*3/MM3 (ref 0.1–0.9)
MONOCYTES NFR BLD AUTO: 6.8 % (ref 5–12)
NEUTROPHILS # BLD AUTO: 3.27 10*3/MM3 (ref 1.7–7)
NEUTROPHILS NFR BLD AUTO: 53.1 % (ref 42.7–76)
NRBC BLD AUTO-RTO: 0 /100 WBC (ref 0–0.2)
PLATELET # BLD AUTO: 330 10*3/MM3 (ref 140–450)
PMV BLD AUTO: 9.9 FL (ref 6–12)
POTASSIUM BLD-SCNC: 4.2 MMOL/L (ref 3.5–5.2)
PROT SERPL-MCNC: 7.3 G/DL (ref 6–8.5)
RBC # BLD AUTO: 4 10*6/MM3 (ref 3.77–5.28)
SODIUM BLD-SCNC: 135 MMOL/L (ref 136–145)
T4 FREE SERPL-MCNC: 1.06 NG/DL (ref 0.93–1.7)
TSH SERPL DL<=0.05 MIU/L-ACNC: 2.25 UIU/ML (ref 0.27–4.2)
VIT B12 BLD-MCNC: 886 PG/ML (ref 211–946)
WBC NRBC COR # BLD: 6.16 10*3/MM3 (ref 3.4–10.8)

## 2020-06-23 PROCEDURE — 36415 COLL VENOUS BLD VENIPUNCTURE: CPT

## 2020-06-23 PROCEDURE — 84439 ASSAY OF FREE THYROXINE: CPT

## 2020-06-23 PROCEDURE — 82306 VITAMIN D 25 HYDROXY: CPT

## 2020-06-23 PROCEDURE — 82607 VITAMIN B-12: CPT

## 2020-06-23 PROCEDURE — 80050 GENERAL HEALTH PANEL: CPT

## 2020-06-24 ENCOUNTER — OFFICE VISIT (OUTPATIENT)
Dept: ENDOCRINOLOGY | Facility: CLINIC | Age: 55
End: 2020-06-24

## 2020-06-24 VITALS
WEIGHT: 163.4 LBS | HEIGHT: 64 IN | RESPIRATION RATE: 18 BRPM | DIASTOLIC BLOOD PRESSURE: 80 MMHG | OXYGEN SATURATION: 98 % | HEART RATE: 71 BPM | BODY MASS INDEX: 27.9 KG/M2 | SYSTOLIC BLOOD PRESSURE: 108 MMHG

## 2020-06-24 DIAGNOSIS — E89.0 POSTABLATIVE HYPOTHYROIDISM: Primary | ICD-10-CM

## 2020-06-24 DIAGNOSIS — E55.9 VITAMIN D DEFICIENCY: ICD-10-CM

## 2020-06-24 PROCEDURE — 99213 OFFICE O/P EST LOW 20 MIN: CPT | Performed by: NURSE PRACTITIONER

## 2020-06-24 NOTE — PROGRESS NOTES
Elizabeth Lopez is a 54 y.o. female. she is here for follow up     Chief Complaint   Patient presents with   • Hypothyroidism              Primary Care/Referring Provider  Raiza Saini MD   follow up      reason - subclinical hyperthyroidism and osteoporosis     from 2-15 neg tsi and tpo ab and toxic subcm MNG , most likely still Graves'     She had MADERA ablation Sept 7, 2018     Pattern suggests central hypothyroidism       Thyroid US      bilateral sucm complex thyroid cysts  maximal dimension 5 mm      US dimensions - within normal limtis     personally reviewed from July 201 4 and stable when compared to July 2016     ==============     symptoms - anxiety       Component      Latest Ref Rng & Units 7/24/2018 10/4/2018 11/20/2018   TSH Baseline      0.460 - 4.680 mIU/mL 0.230 (L) 0.040 (L) 0.190 (L)   Free T4      0.78 - 2.19 ng/dL 0.90 1.19 0.74 (L)   T3, Free      2.0 - 4.4 pg/mL  4.0 3.1   Thyroid Stimulating Immunoglobulin      0.00 - 0.55 IU/L   <0.10               Evaluation history:  TSH   Date Value Ref Range Status   06/23/2020 2.250 0.270 - 4.200 uIU/mL Final     Free T4   Date Value Ref Range Status   06/23/2020 1.06 0.93 - 1.70 ng/dL Final     T3, Free   Date Value Ref Range Status   11/20/2018 3.1 2.0 - 4.4 pg/mL Final       Current medications:  Current Outpatient Medications   Medication Sig Dispense Refill   • carvedilol (COREG) 3.125 MG tablet Take 3.125 mg by mouth Daily.     • calcium carbonate 1250 MG capsule Take 1,250 mg by mouth Daily.     • escitalopram (LEXAPRO) 10 MG tablet Take 1 tablet by mouth Daily. 90 tablet 1   • fluconazole (DIFLUCAN) 150 MG tablet Take one now and repeat in 3 days 2 tablet 0   • GLUCOSAMINE-CHONDROITIN PO Take  by mouth.     • HYDROcodone-acetaminophen (NORCO) 5-325 MG per tablet Take 1 tablet by mouth 2 (Two) Times a Day As Needed for Moderate Pain . (Patient taking differently: Take 1 tablet by mouth Every 6 (Six) Hours As Needed for  Moderate Pain .) 30 tablet 0   • levothyroxine (SYNTHROID, LEVOTHROID) 25 MCG tablet TAKE 1 TABLET BY MOUTH DAILY 30 tablet 3   • Lidocaine Viscous HCl (XYLOCAINE) 2 % solution Take 5 mL by mouth As Needed for Mild Pain . 100 mL 1   • LORazepam (ATIVAN) 0.5 MG tablet Take 1 tablet by mouth Daily As Needed for Anxiety. 30 tablet 0   • magnesium oxide (MAGOX) 400 (241.3 Mg) MG tablet tablet Take 800 mg by mouth Daily.     • meloxicam (MOBIC) 15 MG tablet TAKE 1 TABLET BY MOUTH DAILY 30 tablet 3   • Multiple Vitamins-Minerals (MULTIVITAMIN PO) Take 1 tablet by mouth daily.     • Omega-3 Fatty Acids (FISH OIL CONCENTRATE) 1000 MG capsule Take 1 capsule by mouth 2 (two) times a day.     • omeprazole (priLOSEC) 20 MG capsule TAKE 1 CAPSULE BY MOUTH DAILY. 90 capsule 0   • tiZANidine (ZANAFLEX) 4 MG tablet TAKE 1 TABLET BY MOUTH EVERY 6 HOURS AS NEEDED FOR MUSCLE SPASMS 120 tablet 0   • tiZANidine (ZANAFLEX) 4 MG tablet TAKE 1 TABLET BY MOUTH EVERY 6 HOURS AS NEEDED FOR MUSCLE SPASMS 120 tablet 0   • vitamin E 400 UNIT capsule Take 400 Units by mouth daily.       No current facility-administered medications for this visit.        The following portions of the patient's history were reviewed and updated as appropriate:   Past Medical History:   Diagnosis Date   • Anal fistula    • Anemia    • Astigmatism    • Breast cyst    • Broken heart syndrome 03/02/2016    when mother passed away   • Chronic pain disorder    • Colitis    • Death of family member     Mother passes away.   • Depression    • Generalized anxiety disorder    • GERD (gastroesophageal reflux disease)    • Goiter    • Graves disease    • Heartburn    • Hematuria    • Hyperthyroidism    • Lesion of eyelid     LLL   • Low back pain    • Lumbosacral spondylosis    • Menopausal syndrome    • Myopia    • Osteoporosis    • Pain in back     Lumbar region   • Pain in joint involving left lower leg    • Pain in wrist    • Panic attack    • Presbyopia    • Right upper  quadrant pain    • Takotsubo cardiomyopathy    • Thoracic spondylosis      Past Surgical History:   Procedure Laterality Date   • BREAST CYST EXCISION     • CARDIAC CATHETERIZATION  2016    Normal coronaries with no obstructive disease. Nonischemic stress induced cardiomyopathy known as Takotsubo syndrome.   • COLONOSCOPY  2016    Removal of anal fistula   • CYST REMOVAL Right     right breast   • EYELID CARCINOMA EXCISION  06/15/2015   • HYSTERECTOMY  2014    Total abdominal hysterectomy, bilateral salpingo-oophorectomy. Menorrhagia and leiomyomatous uterus.   • SKIN BIOPSY  2014   • TUBAL ABDOMINAL LIGATION     • WOUND CLOSURE  2014    Layer closure of wound.   • WOUND CLOSURE  2014    Layer closure of wound     Family History   Problem Relation Age of Onset   • Heart disease Mother    • Hyperlipidemia Mother    • Hypertension Mother    • Osteoporosis Mother    • Cancer Mother    • Cancer Father    • Diabetes Sister    • Thyroid disease Sister    • COPD Sister    • Hypertension Sister    • Migraines Sister    • Diabetes Brother    • COPD Brother    • Cancer Maternal Grandmother         Ovarian Cancer   • Breast cancer Other    • Cancer Other    • Other Other         Gall Bladder disease   • Breast cancer Other    • Breast cancer Maternal Aunt      OB History        3    Para   3    Term   3            AB        Living           SAB        TAB        Ectopic        Molar        Multiple        Live Births                  Allergies   Allergen Reactions   • Kenalog [Triamcinolone Acetonide] Palpitations   • Medrol [Methylprednisolone] Palpitations   • Prednisone Palpitations     Social History     Socioeconomic History   • Marital status:      Spouse name: Not on file   • Number of children: Not on file   • Years of education: Not on file   • Highest education level: Not on file   Tobacco Use   • Smoking status: Never Smoker   • Smokeless tobacco: Never Used  "  Substance and Sexual Activity   • Alcohol use: No   • Drug use: No   • Sexual activity: Defer       Review of Systems  Review of Systems   Constitutional: Positive for fatigue and unexpected weight change. Negative for activity change, appetite change and diaphoresis.   HENT: Negative for facial swelling, sneezing, sore throat, tinnitus, trouble swallowing and voice change.    Eyes: Negative for photophobia, pain, discharge, redness, itching and visual disturbance.   Respiratory: Negative for apnea, cough, choking, chest tightness and shortness of breath.    Cardiovascular: Negative for chest pain and leg swelling.   Gastrointestinal: Negative for abdominal distention, abdominal pain, constipation, diarrhea, nausea and vomiting.   Endocrine: Negative for cold intolerance, heat intolerance, polydipsia, polyphagia and polyuria.   Genitourinary: Negative for difficulty urinating, dysuria, frequency, hematuria and urgency.   Musculoskeletal: Negative for arthralgias, back pain, gait problem, joint swelling, myalgias, neck pain and neck stiffness.   Skin: Negative for color change, pallor, rash and wound.   Neurological: Negative for dizziness, tremors, weakness, light-headedness, numbness and headaches.   Hematological: Negative for adenopathy. Does not bruise/bleed easily.   Psychiatric/Behavioral: Negative for behavioral problems, confusion and sleep disturbance.        Objective    /80 (BP Location: Right arm, Patient Position: Sitting, Cuff Size: Adult)   Pulse 71   Resp 18   Ht 162.6 cm (64.02\")   Wt 74.1 kg (163 lb 6.4 oz)   LMP  (LMP Unknown)   SpO2 98%   BMI 28.03 kg/m²   Physical Exam   Constitutional: She is oriented to person, place, and time. She appears well-developed and well-nourished. No distress.   HENT:   Head: Normocephalic and atraumatic.   Right Ear: External ear normal.   Left Ear: External ear normal.   Nose: Nose normal.   Eyes: Pupils are equal, round, and reactive to light. " Conjunctivae and EOM are normal.   Neck: Normal range of motion. Neck supple. No tracheal deviation present. No thyromegaly present.   Cardiovascular: Normal rate, regular rhythm and normal heart sounds.   No murmur heard.  Pulmonary/Chest: Effort normal and breath sounds normal. No respiratory distress. She has no wheezes.   Abdominal: Soft. Bowel sounds are normal. There is no tenderness. There is no rebound and no guarding.   Musculoskeletal: Normal range of motion. She exhibits no edema, tenderness or deformity.   Neurological: She is alert and oriented to person, place, and time. No cranial nerve deficit.   Skin: Skin is warm and dry. No rash noted.   Psychiatric: She has a normal mood and affect. Her behavior is normal. Judgment and thought content normal.       Lab Review  Lab Results   Component Value Date    TSH 2.250 06/23/2020     Lab Results   Component Value Date    FREET4 1.06 06/23/2020        Assessment/Plan       Diagnosis Plan   1. Postablative hypothyroidism     2. Vitamin D deficiency             Medications prescribed:  Outpatient Encounter Medications as of 6/24/2020   Medication Sig Dispense Refill   • carvedilol (COREG) 3.125 MG tablet Take 3.125 mg by mouth Daily.     • calcium carbonate 1250 MG capsule Take 1,250 mg by mouth Daily.     • escitalopram (LEXAPRO) 10 MG tablet Take 1 tablet by mouth Daily. 90 tablet 1   • fluconazole (DIFLUCAN) 150 MG tablet Take one now and repeat in 3 days 2 tablet 0   • GLUCOSAMINE-CHONDROITIN PO Take  by mouth.     • HYDROcodone-acetaminophen (NORCO) 5-325 MG per tablet Take 1 tablet by mouth 2 (Two) Times a Day As Needed for Moderate Pain . (Patient taking differently: Take 1 tablet by mouth Every 6 (Six) Hours As Needed for Moderate Pain .) 30 tablet 0   • levothyroxine (SYNTHROID, LEVOTHROID) 25 MCG tablet TAKE 1 TABLET BY MOUTH DAILY 30 tablet 3   • Lidocaine Viscous HCl (XYLOCAINE) 2 % solution Take 5 mL by mouth As Needed for Mild Pain . 100 mL 1   •  LORazepam (ATIVAN) 0.5 MG tablet Take 1 tablet by mouth Daily As Needed for Anxiety. 30 tablet 0   • magnesium oxide (MAGOX) 400 (241.3 Mg) MG tablet tablet Take 800 mg by mouth Daily.     • meloxicam (MOBIC) 15 MG tablet TAKE 1 TABLET BY MOUTH DAILY 30 tablet 3   • Multiple Vitamins-Minerals (MULTIVITAMIN PO) Take 1 tablet by mouth daily.     • Omega-3 Fatty Acids (FISH OIL CONCENTRATE) 1000 MG capsule Take 1 capsule by mouth 2 (two) times a day.     • omeprazole (priLOSEC) 20 MG capsule TAKE 1 CAPSULE BY MOUTH DAILY. 90 capsule 0   • tiZANidine (ZANAFLEX) 4 MG tablet TAKE 1 TABLET BY MOUTH EVERY 6 HOURS AS NEEDED FOR MUSCLE SPASMS 120 tablet 0   • tiZANidine (ZANAFLEX) 4 MG tablet TAKE 1 TABLET BY MOUTH EVERY 6 HOURS AS NEEDED FOR MUSCLE SPASMS 120 tablet 0   • vitamin E 400 UNIT capsule Take 400 Units by mouth daily.       No facility-administered encounter medications on file as of 6/24/2020.      Post ablative Hypothyroidism              Lab Results   Component Value Date    TSH 2.250 06/23/2020      Lab Results   Component Value Date    FREET4 1.06 06/23/2020    FREET4 1.08 12/04/2019    FREET4 1.02 06/28/2019    FREET4 0.93 04/24/2019       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 documented as back as 2014 and personally reviewed   Report from 2016 , compared to 2014 no change       She had MADERA ablation on Sept 7, 2018     Low Free T4, nl TSH x 2 , retry levothyroxine 25 mcgs and working     Measure other pituitary hormones - normal ( FSH elevated, prolactin nl , HAMA neg )         Osteoporosis     March 2016     Total Lumbar spine- 0.692 gm/cm2 T-Score -3.2      4 2017, repeat shows improvement on forteo     Next April 2019        Completed 2 years of forteo , started 27 of June 2016     Now on prolia       We were doing 50 th weekly but now     calcium + D once daily       Lab Results   Component Value Date    EAUI84KT 44.2 06/23/2020    YOJQ84TI 38.7 12/04/2019    OUZJ41AW 57.3 11/20/2018          PTH and calcium-  nl    We discussed about risk of ONJ and atypical femur fractures    Lab Results   Component Value Date    CHOL 203 (H) 01/28/2020    CHLPL 171 03/31/2016    TRIG 60 01/28/2020    HDL 72 (H) 01/28/2020     (H) 01/28/2020            Return in about 6 months (around 12/24/2020), or if symptoms worsen or fail to improve, for Recheck.          This document has been electronically signed by YOANA Callejas on June 24, 2020 08:24

## 2020-06-29 ENCOUNTER — DOCUMENTATION (OUTPATIENT)
Dept: PHYSICAL THERAPY | Facility: HOSPITAL | Age: 55
End: 2020-06-29

## 2020-06-29 DIAGNOSIS — F41.1 GENERALIZED ANXIETY DISORDER: ICD-10-CM

## 2020-06-29 DIAGNOSIS — M25.562 LEFT KNEE PAIN, UNSPECIFIED CHRONICITY: Primary | ICD-10-CM

## 2020-06-29 DIAGNOSIS — M17.12 PRIMARY OSTEOARTHRITIS OF LEFT KNEE: ICD-10-CM

## 2020-06-29 RX ORDER — ESCITALOPRAM OXALATE 10 MG/1
10 TABLET ORAL DAILY
Qty: 90 TABLET | Refills: 1 | Status: SHIPPED | OUTPATIENT
Start: 2020-06-29 | End: 2020-08-24

## 2020-07-13 RX ORDER — TIZANIDINE 4 MG/1
TABLET ORAL
Qty: 120 TABLET | Refills: 0 | Status: SHIPPED | OUTPATIENT
Start: 2020-07-13 | End: 2020-08-12

## 2020-07-13 RX ORDER — LEVOTHYROXINE SODIUM 0.03 MG/1
TABLET ORAL
Qty: 30 TABLET | Refills: 11 | Status: SHIPPED | OUTPATIENT
Start: 2020-07-13 | End: 2021-05-17 | Stop reason: SDUPTHER

## 2020-07-14 PROBLEM — L98.9 PERINEAL IRRITATION IN FEMALE: Status: ACTIVE | Noted: 2020-07-14

## 2020-07-14 PROCEDURE — 87480 CANDIDA DNA DIR PROBE: CPT | Performed by: NURSE PRACTITIONER

## 2020-07-14 PROCEDURE — 87660 TRICHOMONAS VAGIN DIR PROBE: CPT | Performed by: NURSE PRACTITIONER

## 2020-07-14 PROCEDURE — 87510 GARDNER VAG DNA DIR PROBE: CPT | Performed by: NURSE PRACTITIONER

## 2020-07-28 ENCOUNTER — OFFICE VISIT (OUTPATIENT)
Dept: FAMILY MEDICINE CLINIC | Facility: CLINIC | Age: 55
End: 2020-07-28

## 2020-07-28 VITALS
HEIGHT: 64 IN | WEIGHT: 163 LBS | SYSTOLIC BLOOD PRESSURE: 118 MMHG | BODY MASS INDEX: 27.83 KG/M2 | DIASTOLIC BLOOD PRESSURE: 68 MMHG

## 2020-07-28 DIAGNOSIS — E05.00 GRAVES' DISEASE: ICD-10-CM

## 2020-07-28 DIAGNOSIS — M43.16 SPONDYLOLISTHESIS OF LUMBAR REGION: ICD-10-CM

## 2020-07-28 DIAGNOSIS — Z00.00 ANNUAL PHYSICAL EXAM: Primary | ICD-10-CM

## 2020-07-28 DIAGNOSIS — E87.8 HYPERCHLOREMIA: ICD-10-CM

## 2020-07-28 DIAGNOSIS — Z11.59 NEED FOR HEPATITIS C SCREENING TEST: ICD-10-CM

## 2020-07-28 DIAGNOSIS — Z12.39 SCREENING FOR BREAST CANCER: ICD-10-CM

## 2020-07-28 PROCEDURE — 99396 PREV VISIT EST AGE 40-64: CPT | Performed by: NURSE PRACTITIONER

## 2020-08-10 ENCOUNTER — LAB (OUTPATIENT)
Dept: LAB | Facility: HOSPITAL | Age: 55
End: 2020-08-10

## 2020-08-10 DIAGNOSIS — E87.8 HYPERCHLOREMIA: ICD-10-CM

## 2020-08-10 DIAGNOSIS — Z11.59 NEED FOR HEPATITIS C SCREENING TEST: ICD-10-CM

## 2020-08-10 LAB
CHOLEST SERPL-MCNC: 205 MG/DL (ref 0–200)
HCV AB SER DONR QL: NORMAL
HDLC SERPL-MCNC: 68 MG/DL (ref 40–60)
LDLC SERPL CALC-MCNC: 120 MG/DL (ref 0–100)
LDLC/HDLC SERPL: 1.77 {RATIO}
TRIGL SERPL-MCNC: 84 MG/DL (ref 0–150)
VLDLC SERPL-MCNC: 16.8 MG/DL (ref 5–40)

## 2020-08-10 PROCEDURE — 36415 COLL VENOUS BLD VENIPUNCTURE: CPT

## 2020-08-10 PROCEDURE — 80061 LIPID PANEL: CPT

## 2020-08-10 PROCEDURE — 86803 HEPATITIS C AB TEST: CPT

## 2020-08-11 ENCOUNTER — TELEPHONE (OUTPATIENT)
Dept: FAMILY MEDICINE CLINIC | Facility: CLINIC | Age: 55
End: 2020-08-11

## 2020-08-11 NOTE — TELEPHONE ENCOUNTER
Per YOANA Saba, Ms. Lopez has been called with recent lab results & recommendations.  Continue current medications and follow-up as planned or sooner if any problems.      ----- Message from YOANA Arguello sent at 8/11/2020  6:44 AM CDT -----  Total cholesterol levels still remain slightly elevated at 205 as well as good cholesterol and bad cholesterol.  She needs to continue with her fish oil twice daily and adhere to a low-fat diet.

## 2020-08-11 NOTE — PROGRESS NOTES
Per YOANA Saba, Ms. Lopez has been called with recent lab results & recommendations.  Continue current medications and follow-up as planned or sooner if any problems.

## 2020-08-12 RX ORDER — TIZANIDINE 4 MG/1
TABLET ORAL
Qty: 120 TABLET | Refills: 0 | Status: SHIPPED | OUTPATIENT
Start: 2020-08-12 | End: 2020-09-08

## 2020-08-21 DIAGNOSIS — K21.9 GASTROESOPHAGEAL REFLUX DISEASE WITHOUT ESOPHAGITIS: ICD-10-CM

## 2020-08-22 DIAGNOSIS — F41.1 GENERALIZED ANXIETY DISORDER: ICD-10-CM

## 2020-08-24 RX ORDER — ESCITALOPRAM OXALATE 10 MG/1
10 TABLET ORAL DAILY
Qty: 90 TABLET | Refills: 1 | Status: SHIPPED | OUTPATIENT
Start: 2020-08-24 | End: 2021-04-15

## 2020-08-24 RX ORDER — OMEPRAZOLE 20 MG/1
CAPSULE, DELAYED RELEASE ORAL
Qty: 90 CAPSULE | Refills: 0 | Status: SHIPPED | OUTPATIENT
Start: 2020-08-24 | End: 2020-11-10

## 2020-09-08 RX ORDER — TIZANIDINE 4 MG/1
TABLET ORAL
Qty: 120 TABLET | Refills: 0 | Status: SHIPPED | OUTPATIENT
Start: 2020-09-08 | End: 2020-10-06

## 2020-10-06 RX ORDER — TIZANIDINE 4 MG/1
TABLET ORAL
Qty: 120 TABLET | Refills: 0 | Status: SHIPPED | OUTPATIENT
Start: 2020-10-06 | End: 2020-11-05

## 2020-11-05 RX ORDER — TIZANIDINE 4 MG/1
TABLET ORAL
Qty: 120 TABLET | Refills: 0 | Status: SHIPPED | OUTPATIENT
Start: 2020-11-05 | End: 2020-12-07

## 2020-11-07 PROCEDURE — U0003 INFECTIOUS AGENT DETECTION BY NUCLEIC ACID (DNA OR RNA); SEVERE ACUTE RESPIRATORY SYNDROME CORONAVIRUS 2 (SARS-COV-2) (CORONAVIRUS DISEASE [COVID-19]), AMPLIFIED PROBE TECHNIQUE, MAKING USE OF HIGH THROUGHPUT TECHNOLOGIES AS DESCRIBED BY CMS-2020-01-R: HCPCS | Performed by: NURSE PRACTITIONER

## 2020-11-08 NOTE — PROGRESS NOTES
Elizabeth Lopez is a 54 y.o. female.  Annual physical exam.    HPI: Annual physical exam    Anxiety   Presents for follow-up visit. Patient reports no compulsions, confusion, depressed mood, excessive worry, feeling of choking, hyperventilation, malaise, muscle tension, obsessions, panic or restlessness. Symptoms occur occasionally. The severity of symptoms is mild. The quality of sleep is fair. Nighttime awakenings: several.     Compliance with medications is %.   Thyroid Problem   Presents for follow-up visit. Patient reports no depressed mood, diaphoresis or fatigue. The symptoms have been stable.   Back Pain   This is a chronic problem. The current episode started more than 1 year ago. The problem occurs constantly. The problem has been gradually worsening since onset. The pain is present in the lumbar spine and thoracic spine. The pain radiates to the left foot. Pertinent negatives include no fever. She has tried muscle relaxant, NSAIDs and analgesics for the symptoms. The treatment provided moderate relief.        The following portions of the patient's history were reviewed and updated as appropriate:     She  has a past medical history of Anal fistula, Anemia, Astigmatism, Breast cyst, Broken heart syndrome (03/02/2016), Chronic pain disorder, Colitis, Death of family member, Depression, Generalized anxiety disorder, GERD (gastroesophageal reflux disease), Goiter, Graves disease, Heartburn, Hematuria, Hyperthyroidism, Lesion of eyelid, Low back pain, Lumbosacral spondylosis, Menopausal syndrome, Myopia, Osteoporosis, Pain in back, Pain in joint involving left lower leg, Pain in wrist, Panic attack, Presbyopia, Right upper quadrant pain, Takotsubo cardiomyopathy, and Thoracic spondylosis.  She does not have any pertinent problems on file.  She  has a past surgical history that includes Skin biopsy (06/29/2014); Cardiac catheterization (03/02/2016); Eyelid carcinoma excision (06/15/2015);  Wound Closure (07/20/2014); Wound Closure (01/16/2014); Colonoscopy (01/30/2016); Hysterectomy (04/14/2014); Tubal ligation; Cyst Removal (Right); and Breast cyst excision.  Her family history includes Breast cancer in her maternal aunt and other family members; COPD in her brother and sister; Cancer in her father, maternal grandmother, mother, and another family member; Diabetes in her brother and sister; Heart disease in her mother; Hyperlipidemia in her mother; Hypertension in her mother and sister; Migraines in her sister; Osteoporosis in her mother; Other in an other family member; Thyroid disease in her sister.  She  reports that she has never smoked. She has never used smokeless tobacco. She reports that she does not drink alcohol or use drugs.  Current Outpatient Medications   Medication Sig Dispense Refill   • calcium carbonate 1250 MG capsule Take 1,250 mg by mouth Daily.     • carvedilol (COREG) 3.125 MG tablet Take 3.125 mg by mouth Daily.     • escitalopram (LEXAPRO) 10 MG tablet TAKE 1 TABLET BY MOUTH DAILY 90 tablet 1   • GLUCOSAMINE-CHONDROITIN PO Take  by mouth.     • HYDROcodone-acetaminophen (NORCO) 5-325 MG per tablet Take 1 tablet by mouth 2 (Two) Times a Day As Needed for Moderate Pain . (Patient taking differently: Take 1 tablet by mouth Every 6 (Six) Hours As Needed for Moderate Pain .) 30 tablet 0   • levothyroxine (SYNTHROID, LEVOTHROID) 25 MCG tablet TAKE 1 TABLET BY MOUTH EVERY DAY 30 tablet 11   • LORazepam (ATIVAN) 0.5 MG tablet Take 1 tablet by mouth Daily As Needed for Anxiety. 30 tablet 0   • magnesium oxide (MAGOX) 400 (241.3 Mg) MG tablet tablet Take 800 mg by mouth Daily.     • meloxicam (MOBIC) 15 MG tablet TAKE 1 TABLET BY MOUTH DAILY 30 tablet 3   • Multiple Vitamins-Minerals (MULTIVITAMIN PO) Take 1 tablet by mouth daily.     • Omega-3 Fatty Acids (FISH OIL CONCENTRATE) 1000 MG capsule Take 1 capsule by mouth 2 (two) times a day.     • omeprazole (priLOSEC) 20 MG capsule TAKE  1 CAPSULE BY MOUTH DAILY. 90 capsule 0   • tiZANidine (ZANAFLEX) 4 MG tablet TAKE 1 TABLET BY MOUTH EVERY 6 HOURS AS NEEDED FOR MUSCLE SPASMS 120 tablet 0   • vitamin E 400 UNIT capsule Take 400 Units by mouth daily.       No current facility-administered medications for this visit.      Current Outpatient Medications on File Prior to Visit   Medication Sig   • calcium carbonate 1250 MG capsule Take 1,250 mg by mouth Daily.   • carvedilol (COREG) 3.125 MG tablet Take 3.125 mg by mouth Daily.   • escitalopram (LEXAPRO) 10 MG tablet TAKE 1 TABLET BY MOUTH DAILY   • GLUCOSAMINE-CHONDROITIN PO Take  by mouth.   • HYDROcodone-acetaminophen (NORCO) 5-325 MG per tablet Take 1 tablet by mouth 2 (Two) Times a Day As Needed for Moderate Pain . (Patient taking differently: Take 1 tablet by mouth Every 6 (Six) Hours As Needed for Moderate Pain .)   • levothyroxine (SYNTHROID, LEVOTHROID) 25 MCG tablet TAKE 1 TABLET BY MOUTH EVERY DAY   • LORazepam (ATIVAN) 0.5 MG tablet Take 1 tablet by mouth Daily As Needed for Anxiety.   • magnesium oxide (MAGOX) 400 (241.3 Mg) MG tablet tablet Take 800 mg by mouth Daily.   • meloxicam (MOBIC) 15 MG tablet TAKE 1 TABLET BY MOUTH DAILY   • Multiple Vitamins-Minerals (MULTIVITAMIN PO) Take 1 tablet by mouth daily.   • Omega-3 Fatty Acids (FISH OIL CONCENTRATE) 1000 MG capsule Take 1 capsule by mouth 2 (two) times a day.   • omeprazole (priLOSEC) 20 MG capsule TAKE 1 CAPSULE BY MOUTH DAILY.   • tiZANidine (ZANAFLEX) 4 MG tablet TAKE 1 TABLET BY MOUTH EVERY 6 HOURS AS NEEDED FOR MUSCLE SPASMS   • vitamin E 400 UNIT capsule Take 400 Units by mouth daily.     No current facility-administered medications on file prior to visit.      She is allergic to kenalog [triamcinolone acetonide]; medrol [methylprednisolone]; and prednisone..    Review of Systems   Constitutional: Negative.  Negative for chills, diaphoresis, fatigue and fever.   HENT: Negative.    Eyes: Negative.    Respiratory: Negative.   "  Cardiovascular: Negative.    Gastrointestinal: Negative.    Genitourinary: Negative.    Musculoskeletal: Positive for arthralgias, back pain and myalgias.   Skin: Negative.    Psychiatric/Behavioral: Negative.  Negative for confusion.       Objective    Visit Vitals  /68   Ht 162.6 cm (64\")   Wt 73.9 kg (163 lb)   LMP  (LMP Unknown)   BMI 27.98 kg/m²       Physical Exam   Constitutional: She is oriented to person, place, and time. She appears well-developed and well-nourished.   HENT:   Head: Normocephalic and atraumatic.   Right Ear: External ear normal.   Left Ear: External ear normal.   Nose: Nose normal.   Mouth/Throat: Oropharynx is clear and moist.   Eyes: Pupils are equal, round, and reactive to light. Conjunctivae and EOM are normal.   Neck: Normal range of motion. Neck supple.   Cardiovascular: Normal rate, regular rhythm and normal heart sounds.   Pulmonary/Chest: Effort normal and breath sounds normal.   Bilateral manual breast exam with no dimpling, puckering, masses or nodules palpated    Abdominal: Soft. Bowel sounds are normal.   Musculoskeletal: Normal range of motion.   Neurological: She is alert and oriented to person, place, and time.   Skin: Skin is warm. Capillary refill takes less than 2 seconds.   Psychiatric: She has a normal mood and affect. Her behavior is normal.   Nursing note and vitals reviewed.      Assessment/Plan   Problems Addressed this Visit        Endocrine    Graves' disease       Musculoskeletal and Integument    Spondylolisthesis of lumbar region      Other Visit Diagnoses     Annual physical exam    -  Primary    Hyperchloremia        Relevant Orders    Lipid panel    Need for hepatitis C screening test        Relevant Orders    Hepatitis C antibody    Screening for breast cancer        Relevant Orders    Mammo Screening Digital Tomosynthesis Bilateral With CAD      No orders of the defined types were placed in this encounter.    1.  Annual physical exam:  Continue on " current medications as previously prescribed   I spent 26 minutes in direct face to face contact with patient.  Greater than 50% of this time was spent counseling patient and discussing plan of care.  Return in about 6 months (around 1/28/2021) for Recheck.    2.  Hypercholesterolemia:  Continue on fish oil as previously prescribed  Complete fasting lipid panel as ordered and will notify results when available  Encourage lean meats like chicken and fish as opposed to fatty red meat  Encourage the use of all of oil when cooking as opposed to vegetable oil    3.  Graves' disease:  Continue on levothyroxine as previously prescribed  Continue endocrinology follow-up as scheduled with YOANA Nance on December 21 of 2020    4.  Spondylolisthesis of lumbar region:  Continue on Mobic as previously prescribed  Continue on Zanaflex as previously prescribed  Continue on Norco as previously prescribed  Educated on possible side effects of these medications including not limited to increased risk for drowsiness, sedation, potential for abuse and dependency  Constipation and gastrointestinal bleeding  Encouraged to take these medications with food in order to reduce to gastrointestinal discomfort  Continue orthopedic follow-up with Dr. Perrin as scheduled  Discussed possibility of referral to Dr. George for second opinion    5.  Need for hepatitis C screening test:  Complete hepatitis C antibody as ordered and will notify results when available    6.  Screening for breast cancer:  Radiology will call to schedule bilateral mammogram  Encouraged monthly self breast exams at home        This document has been electronically signed by YOANA Arguello on July 29, 2020 07:17                     No

## 2020-11-10 DIAGNOSIS — K21.9 GASTROESOPHAGEAL REFLUX DISEASE WITHOUT ESOPHAGITIS: ICD-10-CM

## 2020-11-10 RX ORDER — OMEPRAZOLE 20 MG/1
CAPSULE, DELAYED RELEASE ORAL
Qty: 90 CAPSULE | Refills: 0 | Status: SHIPPED | OUTPATIENT
Start: 2020-11-10 | End: 2021-02-08

## 2020-11-23 ENCOUNTER — APPOINTMENT (OUTPATIENT)
Dept: ONCOLOGY | Facility: HOSPITAL | Age: 55
End: 2020-11-23

## 2020-11-23 ENCOUNTER — TELEPHONE (OUTPATIENT)
Dept: ONCOLOGY | Facility: CLINIC | Age: 55
End: 2020-11-23

## 2020-11-23 NOTE — TELEPHONE ENCOUNTER
Hub spoke to Rafaeal who asked that I send her an encounter and she will take care of.     Caller: Nerissa Lopez    Relationship to pt: self    Pt needs to cancel her lab and prolia injection appts for today.    She would like to reschedule any time on 12/8.     Best call back # [phone]

## 2020-12-07 RX ORDER — TIZANIDINE 4 MG/1
TABLET ORAL
Qty: 120 TABLET | Refills: 0 | Status: SHIPPED | OUTPATIENT
Start: 2020-12-07 | End: 2021-01-11

## 2021-01-11 RX ORDER — TIZANIDINE 4 MG/1
TABLET ORAL
Qty: 120 TABLET | Refills: 0 | Status: SHIPPED | OUTPATIENT
Start: 2021-01-11 | End: 2021-02-08

## 2021-01-22 PROCEDURE — 87635 SARS-COV-2 COVID-19 AMP PRB: CPT | Performed by: NURSE PRACTITIONER

## 2021-01-28 ENCOUNTER — OFFICE VISIT (OUTPATIENT)
Dept: FAMILY MEDICINE CLINIC | Facility: CLINIC | Age: 56
End: 2021-01-28

## 2021-01-28 ENCOUNTER — LAB (OUTPATIENT)
Dept: LAB | Facility: HOSPITAL | Age: 56
End: 2021-01-28

## 2021-01-28 VITALS
BODY MASS INDEX: 28.34 KG/M2 | DIASTOLIC BLOOD PRESSURE: 70 MMHG | HEIGHT: 64 IN | SYSTOLIC BLOOD PRESSURE: 104 MMHG | WEIGHT: 166 LBS

## 2021-01-28 DIAGNOSIS — F43.21 GRIEF REACTION: ICD-10-CM

## 2021-01-28 DIAGNOSIS — Z79.899 HIGH RISK MEDICATION USE: ICD-10-CM

## 2021-01-28 DIAGNOSIS — F41.1 GENERALIZED ANXIETY DISORDER: ICD-10-CM

## 2021-01-28 DIAGNOSIS — E05.00 GRAVES' DISEASE: ICD-10-CM

## 2021-01-28 DIAGNOSIS — E78.00 HYPERCHOLESTEROLEMIA: Primary | ICD-10-CM

## 2021-01-28 DIAGNOSIS — Z23 NEED FOR SHINGLES VACCINE: ICD-10-CM

## 2021-01-28 DIAGNOSIS — M25.551 CHRONIC PAIN OF RIGHT HIP: ICD-10-CM

## 2021-01-28 DIAGNOSIS — G89.29 CHRONIC PAIN OF RIGHT HIP: ICD-10-CM

## 2021-01-28 DIAGNOSIS — M43.16 SPONDYLOLISTHESIS OF LUMBAR REGION: ICD-10-CM

## 2021-01-28 PROCEDURE — 80306 DRUG TEST PRSMV INSTRMNT: CPT

## 2021-01-28 PROCEDURE — 99214 OFFICE O/P EST MOD 30 MIN: CPT | Performed by: NURSE PRACTITIONER

## 2021-01-28 PROCEDURE — 90471 IMMUNIZATION ADMIN: CPT | Performed by: NURSE PRACTITIONER

## 2021-01-28 PROCEDURE — 90750 HZV VACC RECOMBINANT IM: CPT | Performed by: NURSE PRACTITIONER

## 2021-01-28 RX ORDER — UBIDECARENONE 100 MG
200 CAPSULE ORAL DAILY
COMMUNITY
Start: 2022-01-01

## 2021-01-28 RX ORDER — LORAZEPAM 0.5 MG/1
0.5 TABLET ORAL DAILY PRN
Qty: 30 TABLET | Refills: 0 | Status: SHIPPED | OUTPATIENT
Start: 2021-01-28 | End: 2021-08-24 | Stop reason: SDUPTHER

## 2021-01-28 RX ORDER — MELATONIN
1000 DAILY
COMMUNITY

## 2021-01-28 RX ORDER — MELOXICAM 15 MG/1
15 TABLET ORAL DAILY
Qty: 90 TABLET | Refills: 3 | Status: SHIPPED | OUTPATIENT
Start: 2021-01-28 | End: 2021-11-15 | Stop reason: SDUPTHER

## 2021-01-28 RX ORDER — LORAZEPAM 0.5 MG/1
0.5 TABLET ORAL DAILY PRN
Qty: 30 TABLET | Refills: 0 | Status: SHIPPED | OUTPATIENT
Start: 2021-01-28 | End: 2021-01-28 | Stop reason: SDUPTHER

## 2021-01-28 RX ORDER — MULTIVIT-MIN/IRON/FOLIC ACID/K 18-600-40
1 CAPSULE ORAL DAILY
COMMUNITY

## 2021-01-28 NOTE — PROGRESS NOTES
"Subjective   Nerissa Lopez is a 55 y.o. female.  6-month follow-up for anxiety, hyperthyroidism and chronic back pain.  A few weeks ago her brother passed away form COVID.  \"He was gone in a weeks time.  I have also lost two nieces recently.  You know with my  passing away and then losing my mother not too long after and then my daughter-in-law  in a car wreck last year it is just too much.  I am having a lot of anxiety trying to deal with anything outside of my house.  Arthritis pain is also getting way worse.  I just started taking the meloxicam on a regular basis and it does help some.\"  Currently on Norco prescribed for pain management.  \"The pain in my back is getting so bad and now I am hurting in my hip.  It has been hurting for months.\"    Anxiety  Presents for follow-up visit. Symptoms include decreased concentration, depressed mood, excessive worry, irritability, malaise, muscle tension, nervous/anxious behavior and restlessness. Patient reports no compulsions, confusion, feeling of choking, hyperventilation, obsessions, panic, shortness of breath or suicidal ideas. Symptoms occur constantly. The severity of symptoms is causing significant distress and severe. The quality of sleep is fair. Nighttime awakenings: several.     Her past medical history is significant for depression. Compliance with medications is %.   Thyroid Problem  Presents for follow-up visit. Symptoms include anxiety and depressed mood. Patient reports no diaphoresis or fatigue. The symptoms have been stable.   Back Pain  This is a chronic problem. The current episode started more than 1 year ago. The problem occurs constantly. The problem is unchanged. The pain is present in the lumbar spine and thoracic spine. The pain radiates to the right thigh. The pain is at a severity of 9/10. The pain is severe. The pain is the same all the time. The symptoms are aggravated by standing, sitting, stress, position and lying down. " Associated symptoms include numbness and tingling. Pertinent negatives include no fever. Risk factors include poor posture, obesity, lack of exercise and menopause. She has tried muscle relaxant, NSAIDs and analgesics for the symptoms. The treatment provided mild relief.   Depression  Visit Type: follow-up  Patient presents with the following symptoms: decreased concentration, depressed mood, excessive worry, feelings of hopelessness, irritability, malaise, muscle tension, nervousness/anxiety and restlessness.  Patient is not experiencing: compulsions, confusion, hyperventilation, obsessions, panic, shortness of breath, suicidal ideas, suicidal planning and thoughts of death.  Frequency of symptoms: constantly   Severity: severe   Sleep quality: fair  Nighttime awakenings: several    Hip Pain   There was no injury mechanism. The pain is present in the right hip. The quality of the pain is described as aching and stabbing. The pain is at a severity of 8/10. The pain is severe. The pain has been constant since onset. Associated symptoms include numbness and tingling. The symptoms are aggravated by weight bearing, palpation and movement. She has tried NSAIDs and rest (Norco) for the symptoms. The treatment provided mild relief.        The following portions of the patient's history were reviewed and updated as appropriate:     Current Outpatient Medications   Medication Sig Dispense Refill   • Ascorbic Acid (Vitamin C) 500 MG capsule Take 1 each by mouth Daily.     • calcium carbonate 1250 MG capsule Take 1,250 mg by mouth Daily.     • carvedilol (COREG) 3.125 MG tablet Take 3.125 mg by mouth Daily.     • cholecalciferol (VITAMIN D3) 25 MCG (1000 UT) tablet Take 1,000 Units by mouth Daily.     • coenzyme Q10 100 MG capsule Take 100 mg by mouth Daily.     • escitalopram (LEXAPRO) 10 MG tablet TAKE 1 TABLET BY MOUTH DAILY 90 tablet 1   • HYDROcodone-acetaminophen (NORCO) 5-325 MG per tablet Take 1 tablet by mouth 2 (Two)  Times a Day As Needed for Moderate Pain . (Patient taking differently: Take 1 tablet by mouth Every 6 (Six) Hours As Needed for Moderate Pain .) 30 tablet 0   • levothyroxine (SYNTHROID, LEVOTHROID) 25 MCG tablet TAKE 1 TABLET BY MOUTH EVERY DAY 30 tablet 11   • LORazepam (ATIVAN) 0.5 MG tablet Take 1 tablet by mouth Daily As Needed for Anxiety. 30 tablet 0   • magnesium oxide (MAGOX) 400 (241.3 Mg) MG tablet tablet Take 800 mg by mouth Daily.     • meloxicam (MOBIC) 15 MG tablet Take 1 tablet by mouth Daily. 90 tablet 3   • Multiple Vitamins-Minerals (MULTIVITAMIN PO) Take 1 tablet by mouth daily.     • Omega-3 Fatty Acids (FISH OIL CONCENTRATE) 1000 MG capsule Take 1 capsule by mouth 2 (two) times a day.     • omeprazole (priLOSEC) 20 MG capsule TAKE ONE CAPSULE BY MOUTH EVERY DAY 90 capsule 0   • tiZANidine (ZANAFLEX) 4 MG tablet TAKE 1 TABLET BY MOUTH EVERY 6 HOURS AS NEEDED FOR MUSCLE SPASMS 120 tablet 0   • vitamin E 400 UNIT capsule Take 400 Units by mouth daily.       No current facility-administered medications for this visit.      Current Outpatient Medications on File Prior to Visit   Medication Sig   • Ascorbic Acid (Vitamin C) 500 MG capsule Take 1 each by mouth Daily.   • calcium carbonate 1250 MG capsule Take 1,250 mg by mouth Daily.   • carvedilol (COREG) 3.125 MG tablet Take 3.125 mg by mouth Daily.   • cholecalciferol (VITAMIN D3) 25 MCG (1000 UT) tablet Take 1,000 Units by mouth Daily.   • coenzyme Q10 100 MG capsule Take 100 mg by mouth Daily.   • escitalopram (LEXAPRO) 10 MG tablet TAKE 1 TABLET BY MOUTH DAILY   • HYDROcodone-acetaminophen (NORCO) 5-325 MG per tablet Take 1 tablet by mouth 2 (Two) Times a Day As Needed for Moderate Pain . (Patient taking differently: Take 1 tablet by mouth Every 6 (Six) Hours As Needed for Moderate Pain .)   • levothyroxine (SYNTHROID, LEVOTHROID) 25 MCG tablet TAKE 1 TABLET BY MOUTH EVERY DAY   • magnesium oxide (MAGOX) 400 (241.3 Mg) MG tablet tablet Take 800  "mg by mouth Daily.   • Multiple Vitamins-Minerals (MULTIVITAMIN PO) Take 1 tablet by mouth daily.   • Omega-3 Fatty Acids (FISH OIL CONCENTRATE) 1000 MG capsule Take 1 capsule by mouth 2 (two) times a day.   • omeprazole (priLOSEC) 20 MG capsule TAKE ONE CAPSULE BY MOUTH EVERY DAY   • tiZANidine (ZANAFLEX) 4 MG tablet TAKE 1 TABLET BY MOUTH EVERY 6 HOURS AS NEEDED FOR MUSCLE SPASMS   • vitamin E 400 UNIT capsule Take 400 Units by mouth daily.   • [DISCONTINUED] GLUCOSAMINE-CHONDROITIN PO Take  by mouth.   • [DISCONTINUED] LORazepam (ATIVAN) 0.5 MG tablet Take 1 tablet by mouth Daily As Needed for Anxiety.   • [DISCONTINUED] meloxicam (MOBIC) 15 MG tablet TAKE 1 TABLET BY MOUTH DAILY     No current facility-administered medications on file prior to visit.      She is allergic to kenalog [triamcinolone acetonide]; medrol [methylprednisolone]; and prednisone..    Review of Systems   Constitutional: Positive for irritability. Negative for chills, diaphoresis, fatigue and fever.   HENT: Negative.  Negative for sore throat.    Eyes: Negative.    Respiratory: Negative.  Negative for cough and shortness of breath.    Cardiovascular: Negative.    Gastrointestinal: Negative.    Genitourinary: Negative.    Musculoskeletal: Positive for back pain.   Skin: Negative.    Neurological: Positive for tingling and numbness.   Psychiatric/Behavioral: Positive for decreased concentration. Negative for confusion and suicidal ideas. The patient is nervous/anxious.        Objective    Visit Vitals  /70   Ht 162.6 cm (64\")   Wt 75.3 kg (166 lb)   LMP  (LMP Unknown)   BMI 28.49 kg/m²       Physical Exam  Vitals signs and nursing note reviewed.   Constitutional:       Appearance: She is well-developed.   HENT:      Head: Normocephalic.      Right Ear: External ear normal.      Left Ear: External ear normal.   Eyes:      Pupils: Pupils are equal, round, and reactive to light.   Neck:      Musculoskeletal: Normal range of motion and neck " supple.   Cardiovascular:      Rate and Rhythm: Normal rate and regular rhythm.      Heart sounds: Normal heart sounds.   Pulmonary:      Effort: Pulmonary effort is normal.      Breath sounds: Normal breath sounds.   Abdominal:      General: Bowel sounds are normal.      Palpations: Abdomen is soft.   Musculoskeletal:      Right hip: She exhibits tenderness.      Lumbar back: She exhibits decreased range of motion, tenderness and pain.   Skin:     General: Skin is warm.      Capillary Refill: Capillary refill takes less than 2 seconds.   Neurological:      Mental Status: She is alert and oriented to person, place, and time.   Psychiatric:         Attention and Perception: Attention normal.         Mood and Affect: Mood is depressed.         Speech: She is noncommunicative.         Behavior: Behavior normal.         Thought Content: Thought content normal.         Assessment/Plan   Problems Addressed this Visit        Endocrine and Metabolic    Graves' disease    Relevant Medications    meloxicam (MOBIC) 15 MG tablet    Other Relevant Orders    TSH    T4, free       Mental Health    Generalized anxiety disorder    Relevant Medications    LORazepam (ATIVAN) 0.5 MG tablet       Musculoskeletal and Injuries    Spondylolisthesis of lumbar region    Relevant Medications    meloxicam (MOBIC) 15 MG tablet    Other Relevant Orders    XR Spine Lumbar 2 or 3 View      Other Visit Diagnoses     Hypercholesterolemia    -  Primary    Relevant Orders    Lipid panel    Grief reaction        Relevant Orders    Ambulatory Referral to Psychiatry (Completed)    CBC & Differential    Comprehensive Metabolic Panel    Chronic pain of right hip        Relevant Medications    meloxicam (MOBIC) 15 MG tablet    Other Relevant Orders    XR Hip With or Without Pelvis 2 - 3 View Right    High risk medication use        Relevant Orders    Urine Drug Screen - Urine, Clean Catch    Need for shingles vaccine        Relevant Orders    Shingrix  Vaccine (Completed)      Diagnoses       Codes Comments    Hypercholesterolemia    -  Primary ICD-10-CM: E78.00  ICD-9-CM: 272.0     Graves' disease     ICD-10-CM: E05.00  ICD-9-CM: 242.00     Spondylolisthesis of lumbar region     ICD-10-CM: M43.16  ICD-9-CM: 738.4     Generalized anxiety disorder     ICD-10-CM: F41.1  ICD-9-CM: 300.02     Grief reaction     ICD-10-CM: F43.21  ICD-9-CM: 309.0     Chronic pain of right hip     ICD-10-CM: M25.551, G89.29  ICD-9-CM: 719.45, 338.29     High risk medication use     ICD-10-CM: Z79.899  ICD-9-CM: V58.69     Need for shingles vaccine     ICD-10-CM: Z23  ICD-9-CM: V04.89         New Medications Ordered This Visit   Medications   • meloxicam (MOBIC) 15 MG tablet     Sig: Take 1 tablet by mouth Daily.     Dispense:  90 tablet     Refill:  3   • LORazepam (ATIVAN) 0.5 MG tablet     Sig: Take 1 tablet by mouth Daily As Needed for Anxiety.     Dispense:  30 tablet     Refill:  0     1.  Hypercholesterolemia:  Continue on fish oil as previously prescribed  Complete fasting lipid panel as ordered and will notify results when available  Discussed importance of regular physical activity regimen to promote cardiovascular health by reducing cholesterol levels     2.  Graves' disease:  Pleat TSH and free T4 as ordered and will notify of results when available  Continue on levothyroxine as previously prescribed     3.  Spondylolisthesis of lumbar region:  Complete x-ray of lumbar spine as ordered and will notify of results when available  Continue on Mobic as previously prescribed  Continue on Zanaflex as previously prescribed  Continue on Norco as previously prescribed per pain management  Educated on possible side effects of these medications including not limited to increased risk for drowsiness, sedation, potential for abuse and dependency  Stressed the importance of taking Mobic with food in order to reduce gastrointestinal discomfort  Discussed at length if she feels like pain is  uncontrolled she needs to contact her pain management provider to discuss medication management    4.  Generalized anxiety disorder:  Continue on Xanax as previously prescribed and refill prescription sent to pharmacy  Reeducated on importance of taking this medication on a as needed basis only and is not to be used it daily  Reeducated on possible side effects of this medication including not limited to increased risk for loss of inhibition, drowsiness and potential for abuse and dependency    5.  Grief reaction:  Referral placed to [facility] counseling and will call to schedule appointment  Encouraged to seek emergency medical treatment for any new or worsening thoughts of depression such as thoughts of hopelessness, helplessness or self-harm    6.  High risk medication use:  BRENDAN reviewed by me and will be scanned into medical record  Complete urine drug screen as ordered     7.  Need for shingles vaccine:  Shingrix vaccine given IM in office  Educated on side effects of this medication including but not limited to increased risk for injection site reaction  Educated on importance of completing second dose of vaccine therapy in no sooner than 2 months but no greater than 6 months    Continue on current medications as previously prescribed  Return in about 4 weeks (around 2/25/2021), or if symptoms worsen or fail to improve, for Recheck.        This document has been electronically signed by YOANA Arguello on January 28, 2021 09:16 CST

## 2021-02-04 ENCOUNTER — LAB (OUTPATIENT)
Dept: LAB | Facility: HOSPITAL | Age: 56
End: 2021-02-04

## 2021-02-04 DIAGNOSIS — E05.00 GRAVES' DISEASE: ICD-10-CM

## 2021-02-04 DIAGNOSIS — F43.21 GRIEF REACTION: ICD-10-CM

## 2021-02-04 DIAGNOSIS — E78.00 HYPERCHOLESTEROLEMIA: ICD-10-CM

## 2021-02-04 LAB
ALBUMIN SERPL-MCNC: 4 G/DL (ref 3.5–5.2)
ALBUMIN/GLOB SERPL: 1.3 G/DL
ALP SERPL-CCNC: 47 U/L (ref 39–117)
ALT SERPL W P-5'-P-CCNC: 16 U/L (ref 1–33)
ANION GAP SERPL CALCULATED.3IONS-SCNC: 8.8 MMOL/L (ref 5–15)
AST SERPL-CCNC: 16 U/L (ref 1–32)
BASOPHILS # BLD AUTO: 0.05 10*3/MM3 (ref 0–0.2)
BASOPHILS NFR BLD AUTO: 1.1 % (ref 0–1.5)
BILIRUB SERPL-MCNC: 0.3 MG/DL (ref 0–1.2)
BUN SERPL-MCNC: 12 MG/DL (ref 6–20)
BUN/CREAT SERPL: 16.9 (ref 7–25)
CALCIUM SPEC-SCNC: 9.6 MG/DL (ref 8.6–10.5)
CHLORIDE SERPL-SCNC: 102 MMOL/L (ref 98–107)
CHOLEST SERPL-MCNC: 184 MG/DL (ref 0–200)
CO2 SERPL-SCNC: 26.2 MMOL/L (ref 22–29)
CREAT SERPL-MCNC: 0.71 MG/DL (ref 0.57–1)
DEPRECATED RDW RBC AUTO: 40.1 FL (ref 37–54)
EOSINOPHIL # BLD AUTO: 0.2 10*3/MM3 (ref 0–0.4)
EOSINOPHIL NFR BLD AUTO: 4.4 % (ref 0.3–6.2)
ERYTHROCYTE [DISTWIDTH] IN BLOOD BY AUTOMATED COUNT: 12.1 % (ref 12.3–15.4)
GFR SERPL CREATININE-BSD FRML MDRD: 86 ML/MIN/1.73
GLOBULIN UR ELPH-MCNC: 3.1 GM/DL
GLUCOSE SERPL-MCNC: 81 MG/DL (ref 65–99)
HCT VFR BLD AUTO: 36.3 % (ref 34–46.6)
HDLC SERPL-MCNC: 64 MG/DL (ref 40–60)
HGB BLD-MCNC: 12.6 G/DL (ref 12–15.9)
IMM GRANULOCYTES # BLD AUTO: 0.01 10*3/MM3 (ref 0–0.05)
IMM GRANULOCYTES NFR BLD AUTO: 0.2 % (ref 0–0.5)
LDLC SERPL CALC-MCNC: 109 MG/DL (ref 0–100)
LDLC/HDLC SERPL: 1.69 {RATIO}
LYMPHOCYTES # BLD AUTO: 1.84 10*3/MM3 (ref 0.7–3.1)
LYMPHOCYTES NFR BLD AUTO: 40.4 % (ref 19.6–45.3)
MCH RBC QN AUTO: 31.6 PG (ref 26.6–33)
MCHC RBC AUTO-ENTMCNC: 34.7 G/DL (ref 31.5–35.7)
MCV RBC AUTO: 91 FL (ref 79–97)
MONOCYTES # BLD AUTO: 0.27 10*3/MM3 (ref 0.1–0.9)
MONOCYTES NFR BLD AUTO: 5.9 % (ref 5–12)
NEUTROPHILS NFR BLD AUTO: 2.18 10*3/MM3 (ref 1.7–7)
NEUTROPHILS NFR BLD AUTO: 48 % (ref 42.7–76)
NRBC BLD AUTO-RTO: 0 /100 WBC (ref 0–0.2)
PLATELET # BLD AUTO: 362 10*3/MM3 (ref 140–450)
PMV BLD AUTO: 9.7 FL (ref 6–12)
POTASSIUM SERPL-SCNC: 4.4 MMOL/L (ref 3.5–5.2)
PROT SERPL-MCNC: 7.1 G/DL (ref 6–8.5)
RBC # BLD AUTO: 3.99 10*6/MM3 (ref 3.77–5.28)
SODIUM SERPL-SCNC: 137 MMOL/L (ref 136–145)
T4 FREE SERPL-MCNC: 1.08 NG/DL (ref 0.93–1.7)
TRIGL SERPL-MCNC: 59 MG/DL (ref 0–150)
TSH SERPL DL<=0.05 MIU/L-ACNC: 1.98 UIU/ML (ref 0.27–4.2)
VLDLC SERPL-MCNC: 11 MG/DL (ref 5–40)
WBC # BLD AUTO: 4.55 10*3/MM3 (ref 3.4–10.8)

## 2021-02-04 PROCEDURE — 84439 ASSAY OF FREE THYROXINE: CPT

## 2021-02-04 PROCEDURE — 80050 GENERAL HEALTH PANEL: CPT

## 2021-02-04 PROCEDURE — 36415 COLL VENOUS BLD VENIPUNCTURE: CPT | Performed by: NURSE PRACTITIONER

## 2021-02-04 PROCEDURE — 80061 LIPID PANEL: CPT

## 2021-02-05 ENCOUNTER — TELEPHONE (OUTPATIENT)
Dept: FAMILY MEDICINE CLINIC | Facility: CLINIC | Age: 56
End: 2021-02-05

## 2021-02-05 NOTE — PROGRESS NOTES
Per YOANA Kidd, Ms. Lopez has been called with recent lab results & recommendations.  Continue current medications and follow-up as planned or sooner if any problems.

## 2021-02-05 NOTE — TELEPHONE ENCOUNTER
Per YOANA Kidd, Ms. Lopez has been called with recent lab results & recommendations.  Continue current medications and follow-up as planned or sooner if any problems.        ----- Message from YOANA Arguello sent at 2/5/2021  7:30 AM CST -----  Total cholesterol is now within normal limits.  Good cholesterol and bad cholesterol both remain slightly elevated but have also improved.  Continue medications as prescribed.  All other labs essentially normal.

## 2021-02-08 DIAGNOSIS — K21.9 GASTROESOPHAGEAL REFLUX DISEASE WITHOUT ESOPHAGITIS: ICD-10-CM

## 2021-02-08 RX ORDER — OMEPRAZOLE 20 MG/1
CAPSULE, DELAYED RELEASE ORAL
Qty: 90 CAPSULE | Refills: 0 | Status: SHIPPED | OUTPATIENT
Start: 2021-02-08 | End: 2021-05-10 | Stop reason: SDUPTHER

## 2021-02-08 RX ORDER — TIZANIDINE 4 MG/1
TABLET ORAL
Qty: 120 TABLET | Refills: 0 | Status: SHIPPED | OUTPATIENT
Start: 2021-02-08 | End: 2021-03-09

## 2021-03-01 ENCOUNTER — OFFICE VISIT (OUTPATIENT)
Dept: FAMILY MEDICINE CLINIC | Facility: CLINIC | Age: 56
End: 2021-03-01

## 2021-03-01 VITALS
WEIGHT: 164.2 LBS | HEIGHT: 64 IN | BODY MASS INDEX: 28.03 KG/M2 | SYSTOLIC BLOOD PRESSURE: 116 MMHG | DIASTOLIC BLOOD PRESSURE: 74 MMHG

## 2021-03-01 DIAGNOSIS — M25.562 CHRONIC PAIN OF LEFT KNEE: ICD-10-CM

## 2021-03-01 DIAGNOSIS — F33.1 MODERATE EPISODE OF RECURRENT MAJOR DEPRESSIVE DISORDER (HCC): ICD-10-CM

## 2021-03-01 DIAGNOSIS — G89.29 CHRONIC PAIN OF LEFT KNEE: ICD-10-CM

## 2021-03-01 DIAGNOSIS — F41.1 GENERALIZED ANXIETY DISORDER: Primary | ICD-10-CM

## 2021-03-01 PROBLEM — F33.9 RECURRENT MAJOR DEPRESSIVE DISORDER: Status: ACTIVE | Noted: 2021-03-01

## 2021-03-01 PROCEDURE — 99214 OFFICE O/P EST MOD 30 MIN: CPT | Performed by: NURSE PRACTITIONER

## 2021-03-01 RX ORDER — LIDOCAINE 50 MG/G
OINTMENT TOPICAL
Qty: 50 G | Refills: 1 | Status: SHIPPED | OUTPATIENT
Start: 2021-03-01

## 2021-03-01 NOTE — PROGRESS NOTES
"Elizabeth Lopez is a 55 y.o. female.  One month follow-up for depression and anxiety.  Has referral placed to psychiatrist at previous visit.  \"I was not able to schedule an appointment with them.  I just don't have the time.  I have to make sure that the grandkids get their counseling.  That is more important than anything and I just do not have time for an appointment for myself.\"  Continues to complain of chronic left knee pain.  Currently sees pain management is on Norco with little relief.  \"I am no longer to have to have surgery but as discussed figure out when I can take the time to be able to recover.\"  Anxiety  Presents for follow-up visit. Symptoms include decreased concentration, excessive worry, irritability, malaise, muscle tension and restlessness. Patient reports no compulsions, confusion, feeling of choking, hyperventilation, obsessions, panic or suicidal ideas. Symptoms occur constantly. The severity of symptoms is causing significant distress and moderate. The quality of sleep is fair. Nighttime awakenings: several.     Her past medical history is significant for depression. Compliance with medications is %.   Depression  Visit Type: follow-up  Patient presents with the following symptoms: decreased concentration, excessive worry, feelings of hopelessness, irritability, malaise, muscle tension and restlessness.  Patient is not experiencing: compulsions, confusion, hyperventilation, obsessions, panic, suicidal ideas, suicidal planning and thoughts of death.  Frequency of symptoms: constantly   Severity: moderate   Sleep quality: fair  Nighttime awakenings: several    Knee Pain   The incident occurred at home. There was no injury mechanism. The pain is present in the left knee. The quality of the pain is described as aching and shooting. The pain is at a severity of 9/10. The pain is severe. The pain has been fluctuating since onset. The symptoms are aggravated by movement, palpation " and weight bearing.        The following portions of the patient's history were reviewed and updated as appropriate:     Current Outpatient Medications   Medication Sig Dispense Refill   • Ascorbic Acid (Vitamin C) 500 MG capsule Take 1 each by mouth Daily.     • calcium carbonate 1250 MG capsule Take 1,250 mg by mouth Daily.     • carvedilol (COREG) 3.125 MG tablet Take 3.125 mg by mouth Daily.     • cholecalciferol (VITAMIN D3) 25 MCG (1000 UT) tablet Take 1,000 Units by mouth Daily.     • coenzyme Q10 100 MG capsule Take 100 mg by mouth Daily.     • escitalopram (LEXAPRO) 10 MG tablet TAKE 1 TABLET BY MOUTH DAILY 90 tablet 1   • HYDROcodone-acetaminophen (NORCO) 5-325 MG per tablet Take 1 tablet by mouth 2 (Two) Times a Day As Needed for Moderate Pain . (Patient taking differently: Take 1 tablet by mouth Every 6 (Six) Hours As Needed for Moderate Pain .) 30 tablet 0   • levothyroxine (SYNTHROID, LEVOTHROID) 25 MCG tablet TAKE 1 TABLET BY MOUTH EVERY DAY 30 tablet 11   • LORazepam (ATIVAN) 0.5 MG tablet Take 1 tablet by mouth Daily As Needed for Anxiety. 30 tablet 0   • magnesium oxide (MAGOX) 400 (241.3 Mg) MG tablet tablet Take 800 mg by mouth Daily.     • meloxicam (MOBIC) 15 MG tablet Take 1 tablet by mouth Daily. 90 tablet 3   • Multiple Vitamins-Minerals (MULTIVITAMIN PO) Take 1 tablet by mouth daily.     • Omega-3 Fatty Acids (FISH OIL CONCENTRATE) 1000 MG capsule Take 1 capsule by mouth 2 (two) times a day.     • omeprazole (priLOSEC) 20 MG capsule TAKE ONE CAPSULE BY MOUTH EVERY DAY 90 capsule 0   • tiZANidine (ZANAFLEX) 4 MG tablet TAKE 1 TABLET BY MOUTH EVERY 6 HOURS AS NEEDED FOR MUSCLE SPASMS 120 tablet 0   • vitamin E 400 UNIT capsule Take 400 Units by mouth daily.     • lidocaine (XYLOCAINE) 5 % ointment Apply  topically to the appropriate area as directed Every 2 (Two) Hours As Needed for Mild Pain . 50 g 1     No current facility-administered medications for this visit.      Current Outpatient  Medications on File Prior to Visit   Medication Sig   • Ascorbic Acid (Vitamin C) 500 MG capsule Take 1 each by mouth Daily.   • calcium carbonate 1250 MG capsule Take 1,250 mg by mouth Daily.   • carvedilol (COREG) 3.125 MG tablet Take 3.125 mg by mouth Daily.   • cholecalciferol (VITAMIN D3) 25 MCG (1000 UT) tablet Take 1,000 Units by mouth Daily.   • coenzyme Q10 100 MG capsule Take 100 mg by mouth Daily.   • escitalopram (LEXAPRO) 10 MG tablet TAKE 1 TABLET BY MOUTH DAILY   • HYDROcodone-acetaminophen (NORCO) 5-325 MG per tablet Take 1 tablet by mouth 2 (Two) Times a Day As Needed for Moderate Pain . (Patient taking differently: Take 1 tablet by mouth Every 6 (Six) Hours As Needed for Moderate Pain .)   • levothyroxine (SYNTHROID, LEVOTHROID) 25 MCG tablet TAKE 1 TABLET BY MOUTH EVERY DAY   • LORazepam (ATIVAN) 0.5 MG tablet Take 1 tablet by mouth Daily As Needed for Anxiety.   • magnesium oxide (MAGOX) 400 (241.3 Mg) MG tablet tablet Take 800 mg by mouth Daily.   • meloxicam (MOBIC) 15 MG tablet Take 1 tablet by mouth Daily.   • Multiple Vitamins-Minerals (MULTIVITAMIN PO) Take 1 tablet by mouth daily.   • Omega-3 Fatty Acids (FISH OIL CONCENTRATE) 1000 MG capsule Take 1 capsule by mouth 2 (two) times a day.   • omeprazole (priLOSEC) 20 MG capsule TAKE ONE CAPSULE BY MOUTH EVERY DAY   • tiZANidine (ZANAFLEX) 4 MG tablet TAKE 1 TABLET BY MOUTH EVERY 6 HOURS AS NEEDED FOR MUSCLE SPASMS   • vitamin E 400 UNIT capsule Take 400 Units by mouth daily.     No current facility-administered medications on file prior to visit.      She is allergic to kenalog [triamcinolone acetonide]; medrol [methylprednisolone]; and prednisone..    Review of Systems   Constitutional: Positive for irritability. Negative for chills.   HENT: Negative.  Negative for sore throat.    Respiratory: Negative.  Negative for cough.    Cardiovascular: Negative.    Gastrointestinal: Negative.    Genitourinary: Negative.    Skin: Negative.   "  Neurological: Negative.    Psychiatric/Behavioral: Positive for decreased concentration. Negative for confusion and suicidal ideas.       Objective    Visit Vitals  /74   Ht 162.6 cm (64\")   Wt 74.5 kg (164 lb 3.2 oz)   LMP  (LMP Unknown)   BMI 28.18 kg/m²       Physical Exam  Vitals signs and nursing note reviewed.   Constitutional:       Appearance: She is well-developed.   HENT:      Head: Normocephalic.      Right Ear: External ear normal.      Left Ear: External ear normal.   Eyes:      Pupils: Pupils are equal, round, and reactive to light.   Neck:      Musculoskeletal: Normal range of motion and neck supple.   Cardiovascular:      Rate and Rhythm: Normal rate and regular rhythm.      Heart sounds: Normal heart sounds.   Pulmonary:      Effort: Pulmonary effort is normal.      Breath sounds: Normal breath sounds.   Abdominal:      General: Bowel sounds are normal.      Palpations: Abdomen is soft.   Musculoskeletal:      Left knee: She exhibits decreased range of motion. Tenderness found.   Skin:     General: Skin is warm.      Capillary Refill: Capillary refill takes less than 2 seconds.   Neurological:      Mental Status: She is alert and oriented to person, place, and time.   Psychiatric:         Attention and Perception: Attention normal.         Mood and Affect: Mood normal.         Speech: Speech normal.         Behavior: Behavior normal.         Thought Content: Thought content does not include homicidal or suicidal ideation.         Assessment/Plan   Diagnoses and all orders for this visit:    1. Generalized anxiety disorder (Primary)    2. Moderate episode of recurrent major depressive disorder (CMS/HCC)    3. Chronic pain of left knee  -     Ambulatory Referral to Orthopedic Surgery  -     lidocaine (XYLOCAINE) 5 % ointment; Apply  topically to the appropriate area as directed Every 2 (Two) Hours As Needed for Mild Pain .  Dispense: 50 g; Refill: 1  -     Cancel: XR Knee 3 View Left; " Future  -     XR knee 4+ vw left; Future    Continue medications as previously prescribed   Reeducated on possible sedative side effects of Ativan and encouraged not to take medication in combination with her Norco as this may increase risk for respiratory depression  Strongly encouraged to seek psychiatric counseling and discussed importance of maintaining her mental health in order to provide her in children with the appropriate care they determine need  Referral placed orthopedics and call to schedule appointment  Begin lidocaine cream as prescribed  Educated on importance of thoroughly washing hands after each application as this medication is topically absorbed complete x-ray of the left knee as ordered will notify of results when available  Encouraged to seek emergency medical treatment for any new or worsening signs of depression such as thoughts of hopelessness, helplessness or self-harm    Continue on current medications as previously prescribed   I spent 26 minutes charting and in face to face contact with patient.  Greater than 50% of this time was spent counseling patient and discussing plan of care.  Return in about 5 months (around 8/1/2021) for Annual physical.        This document has been electronically signed by YOANA Arguello on March 1, 2021 20:24 CST

## 2021-03-09 RX ORDER — TIZANIDINE 4 MG/1
TABLET ORAL
Qty: 120 TABLET | Refills: 0 | Status: SHIPPED | OUTPATIENT
Start: 2021-03-09 | End: 2021-04-06

## 2021-03-24 ENCOUNTER — TELEPHONE (OUTPATIENT)
Dept: FAMILY MEDICINE CLINIC | Facility: CLINIC | Age: 56
End: 2021-03-24

## 2021-03-24 ENCOUNTER — PATIENT MESSAGE (OUTPATIENT)
Dept: FAMILY MEDICINE CLINIC | Facility: CLINIC | Age: 56
End: 2021-03-24

## 2021-03-24 RX ORDER — POLYETHYLENE GLYCOL 3350 17 G/17G
17 POWDER, FOR SOLUTION ORAL DAILY
Qty: 765 G | Refills: 5 | Status: SHIPPED | OUTPATIENT
Start: 2021-03-24

## 2021-03-24 NOTE — TELEPHONE ENCOUNTER
From: Nerissa Lopez  To: YOANA Arguello  Sent: 3/24/2021 10:59 AM CDT  Subject: Prescription Question    I was needing you to please fax in a prescription for me to [facility] for MiraLAX please my insurance will pay for it by prescription

## 2021-03-24 NOTE — TELEPHONE ENCOUNTER
Last OV 03/01/2021    Next Rita 08/02/2021        ----- Message from Nerissa Lopez sent at 3/24/2021 10:59 AM CDT -----  Regarding: Prescription Question  Contact: [phone]  I was needing you to please fax in a prescription for me to [facility] for MiraLAX please my insurance will pay for it by prescription

## 2021-03-24 NOTE — TELEPHONE ENCOUNTER
Per YOANA Kidd, Ms. Lopez has been called with recent left Knee x-ray results & recommendations.  Continue current medications and follow-up as planned or sooner if any problems.         ----- Message from YOANA Arguello sent at 3/24/2021  6:36 AM CDT -----  X-ray showed mild degenerative joint disease.  She has been referred to Dr. Smith's office and they will call her to schedule the appointment.

## 2021-03-29 ENCOUNTER — APPOINTMENT (OUTPATIENT)
Dept: VACCINE CLINIC | Facility: HOSPITAL | Age: 56
End: 2021-03-29

## 2021-04-06 RX ORDER — TIZANIDINE 4 MG/1
TABLET ORAL
Qty: 120 TABLET | Refills: 0 | Status: SHIPPED | OUTPATIENT
Start: 2021-04-06 | End: 2021-05-07

## 2021-04-15 DIAGNOSIS — F41.1 GENERALIZED ANXIETY DISORDER: ICD-10-CM

## 2021-04-15 RX ORDER — ESCITALOPRAM OXALATE 10 MG/1
10 TABLET ORAL DAILY
Qty: 90 TABLET | Refills: 1 | Status: SHIPPED | OUTPATIENT
Start: 2021-04-15 | End: 2021-08-24 | Stop reason: SDUPTHER

## 2021-04-15 RX ORDER — ESCITALOPRAM OXALATE 10 MG/1
10 TABLET ORAL DAILY
Qty: 90 TABLET | Refills: 1 | Status: SHIPPED | OUTPATIENT
Start: 2021-04-15 | End: 2021-11-15 | Stop reason: SDUPTHER

## 2021-05-07 RX ORDER — TIZANIDINE 4 MG/1
TABLET ORAL
Qty: 120 TABLET | Refills: 0 | OUTPATIENT
Start: 2021-05-07 | End: 2021-06-03

## 2021-05-10 DIAGNOSIS — K21.9 GASTROESOPHAGEAL REFLUX DISEASE WITHOUT ESOPHAGITIS: ICD-10-CM

## 2021-05-10 RX ORDER — OMEPRAZOLE 20 MG/1
20 CAPSULE, DELAYED RELEASE ORAL DAILY
Qty: 90 CAPSULE | Refills: 3 | Status: SHIPPED | OUTPATIENT
Start: 2021-05-10 | End: 2021-11-15 | Stop reason: SDUPTHER

## 2021-05-17 RX ORDER — LEVOTHYROXINE SODIUM 0.03 MG/1
25 TABLET ORAL DAILY
Qty: 30 TABLET | Refills: 11 | Status: SHIPPED | OUTPATIENT
Start: 2021-05-17 | End: 2021-11-15 | Stop reason: SDUPTHER

## 2021-06-03 PROCEDURE — 87480 CANDIDA DNA DIR PROBE: CPT | Performed by: NURSE PRACTITIONER

## 2021-06-03 PROCEDURE — 87660 TRICHOMONAS VAGIN DIR PROBE: CPT | Performed by: NURSE PRACTITIONER

## 2021-06-03 PROCEDURE — 87510 GARDNER VAG DNA DIR PROBE: CPT | Performed by: NURSE PRACTITIONER

## 2021-06-07 RX ORDER — TIZANIDINE 4 MG/1
4 TABLET ORAL NIGHTLY PRN
Qty: 120 TABLET | Refills: 2 | Status: SHIPPED | OUTPATIENT
Start: 2021-06-07 | End: 2021-07-21 | Stop reason: SDUPTHER

## 2021-06-07 RX ORDER — ASPIRIN 81 MG/1
81 TABLET ORAL DAILY
COMMUNITY
End: 2022-05-16

## 2021-06-07 RX ORDER — HYDROCODONE BITARTRATE AND ACETAMINOPHEN 5; 325 MG/1; MG/1
1 TABLET ORAL
COMMUNITY
Start: 2021-05-20

## 2021-06-21 ENCOUNTER — APPOINTMENT (OUTPATIENT)
Dept: GENERAL RADIOLOGY | Facility: HOSPITAL | Age: 56
End: 2021-06-21

## 2021-06-21 ENCOUNTER — HOSPITAL ENCOUNTER (EMERGENCY)
Facility: HOSPITAL | Age: 56
Discharge: HOME | End: 2021-06-21
Attending: STUDENT IN AN ORGANIZED HEALTH CARE EDUCATION/TRAINING PROGRAM | Admitting: STUDENT IN AN ORGANIZED HEALTH CARE EDUCATION/TRAINING PROGRAM

## 2021-06-21 VITALS
HEIGHT: 64 IN | BODY MASS INDEX: 28.17 KG/M2 | WEIGHT: 165 LBS | TEMPERATURE: 98.2 F | OXYGEN SATURATION: 97 % | DIASTOLIC BLOOD PRESSURE: 82 MMHG | RESPIRATION RATE: 18 BRPM | SYSTOLIC BLOOD PRESSURE: 128 MMHG | HEART RATE: 66 BPM

## 2021-06-21 DIAGNOSIS — S40.021A CONTUSION OF RIGHT UPPER EXTREMITY, INITIAL ENCOUNTER: ICD-10-CM

## 2021-06-21 DIAGNOSIS — M79.18 BUTTOCK PAIN: ICD-10-CM
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
## 2021-06-21 PROCEDURE — 73090 X-RAY EXAM OF FOREARM: CPT

## 2021-06-21 PROCEDURE — 99282 EMERGENCY DEPT VISIT SF MDM: CPT

## 2021-06-21 PROCEDURE — 72220 X-RAY EXAM SACRUM TAILBONE: CPT

## 2021-06-21 NOTE — ED PROVIDER NOTES
"Subjective   55-year-old female with severe osteoporosis comes to the ER after mechanical fall while walking down some steps.  She landed on her tailbone and her right forearm.  Reforms very tender and swollen.  She did not hit her head.  No loss of consciousness.  Patient denies being on a blood thinner.  She also endorses her \"bottom\" hurts.  She is able to ambulate okay, but it is painful.      History provided by:  Patient   used: No        Review of Systems   Constitutional: Positive for activity change. Negative for fatigue.   Respiratory: Negative for cough and shortness of breath.    Cardiovascular: Negative for chest pain and palpitations.   Gastrointestinal: Negative for abdominal pain and nausea.   Genitourinary: Negative for dysuria and flank pain.   Skin: Negative for color change, rash and wound.   Neurological: Negative for dizziness, weakness, light-headedness and headaches.   Psychiatric/Behavioral: Negative for agitation. The patient is not nervous/anxious.        Past Medical History:   Diagnosis Date   • Anal fistula    • Anemia    • Astigmatism    • Breast cyst    • Broken heart syndrome 03/02/2016    when mother passed away   • Chronic pain disorder    • Colitis    • Death of family member     Mother passes away.   • Depression    • Generalized anxiety disorder    • GERD (gastroesophageal reflux disease)    • Goiter    • Graves disease    • Heartburn    • Hematuria    • Hyperthyroidism    • Lesion of eyelid     LLL   • Low back pain    • Lumbosacral spondylosis    • Menopausal syndrome    • Myopia    • Osteoporosis    • Pain in back     Lumbar region   • Pain in joint involving left lower leg    • Pain in wrist    • Panic attack    • Presbyopia    • Right upper quadrant pain    • Takotsubo cardiomyopathy    • Thoracic spondylosis        Allergies   Allergen Reactions   • Kenalog [Triamcinolone Acetonide] Palpitations   • Medrol [Methylprednisolone] Palpitations   • " "Prednisone Palpitations       Past Surgical History:   Procedure Laterality Date   • BREAST CYST EXCISION     • CARDIAC CATHETERIZATION  03/02/2016    Normal coronaries with no obstructive disease. Nonischemic stress induced cardiomyopathy known as Takotsubo syndrome.   • COLONOSCOPY  01/30/2016    Removal of anal fistula   • CYST REMOVAL Right     right breast   • EYELID CARCINOMA EXCISION  06/15/2015   • HYSTERECTOMY  04/14/2014    Total abdominal hysterectomy, bilateral salpingo-oophorectomy. Menorrhagia and leiomyomatous uterus.   • SKIN BIOPSY  06/29/2014   • TUBAL ABDOMINAL LIGATION     • WOUND CLOSURE  07/20/2014    Layer closure of wound.   • WOUND CLOSURE  01/16/2014    Layer closure of wound       Family History   Problem Relation Age of Onset   • Heart disease Mother    • Hyperlipidemia Mother    • Hypertension Mother    • Osteoporosis Mother    • Cancer Mother    • Cancer Father    • Diabetes Sister    • Thyroid disease Sister    • COPD Sister    • Hypertension Sister    • Migraines Sister    • Diabetes Brother    • COPD Brother    • Cancer Maternal Grandmother         Ovarian Cancer   • Breast cancer Other    • Cancer Other    • Other Other         Gall Bladder disease   • Breast cancer Other    • Breast cancer Maternal Aunt        Social History     Socioeconomic History   • Marital status:      Spouse name: Not on file   • Number of children: Not on file   • Years of education: Not on file   • Highest education level: Not on file   Tobacco Use   • Smoking status: Never Smoker   • Smokeless tobacco: Never Used   Substance and Sexual Activity   • Alcohol use: No   • Drug use: No   • Sexual activity: Defer           Objective    Vitals:    06/21/21 1626   BP: 155/92   BP Location: Right arm   Patient Position: Sitting   Pulse: 75   Resp: 20   Temp: 98.2 °F (36.8 °C)   TempSrc: Temporal   SpO2: 97%   Weight: 74.8 kg (165 lb)   Height: 162.6 cm (64\")       Physical Exam  Vitals and nursing note " reviewed.   Constitutional:       General: She is not in acute distress.     Appearance: She is well-developed. She is not ill-appearing or diaphoretic.   HENT:      Head: Normocephalic.      Right Ear: External ear normal.      Left Ear: External ear normal.   Eyes:      Conjunctiva/sclera: Conjunctivae normal.   Neck:      Trachea: Trachea normal.   Pulmonary:      Effort: Pulmonary effort is normal. No accessory muscle usage or respiratory distress.   Musculoskeletal:         General: Swelling, tenderness, deformity and signs of injury present. Normal range of motion.      Right elbow: Normal.      Right forearm: Swelling and tenderness present. No lacerations.      Right wrist: Normal.      Cervical back: No tenderness.      Thoracic back: No tenderness.      Lumbar back: No tenderness.      Right hip: Normal.      Right upper leg: Normal.      Right lower leg: Normal.        Legs:    Skin:     General: Skin is warm and dry.      Capillary Refill: Capillary refill takes less than 2 seconds.   Neurological:      Mental Status: She is alert and oriented to person, place, and time.   Psychiatric:         Behavior: Behavior normal.         Procedures           ED Course      XR Sacrum & Coccyx   Final Result   CONCLUSION:   No fracture of the sacrum or coccyx.   L5 spondylolysis with stable grade 1 spondylolisthesis of L5 on   S1.   Degenerative disc disease L5-S1.      45277      Electronically signed by:  Migue Archer MD  6/21/2021 5:42 PM CDT   Workstation: [id number]      XR Forearm 2 View Right   Final Result   CONCLUSION:   Contusion/hematoma subcutaneous fat medial and dorsal to the   proximal ulnar shaft.   Small spurs medial humeral epicondyle.   No fracture or dislocation.      09100      Electronically signed by:  Migue Archer MD  6/21/2021 5:36 PM CDT   Workstation: [id number]                                             MDM  Number of Diagnoses or Management Options  Buttock pain: new and requires  workup  Contusion of right upper extremity, initial encounter: new and requires workup  Fall, initial encounter: new and requires workup  Diagnosis management comments: Vital signs are stable, afebrile.  Neurovascularly intact.  X-ray of the sacrum and forearm negative for acute osseous pathology.  There is a large contusion/hematoma and forearm x-ray.  On reevaluation, patient is alert and resting comfortably. I discussed the results of the emergency department evaluation.  I recommended primary care follow-up.  Return precautions discussed.      Final diagnoses:   Fall, initial encounter   Buttock pain   Contusion of right upper extremity, initial encounter       ED Disposition  ED Disposition     ED Disposition Condition Comment    Discharge Stable           Marti Richardson, APRN  [address]  [address] [facility] [address]  [place] [address]  [phone]    Schedule an appointment as soon as possible for a visit in 2 days  ER follow up         Medication List      No changes were made to your prescriptions during this visit.          Romulo Horan MD  06/21/21 6922

## 2021-06-21 NOTE — ED NOTES
Pt arrived from home. Reports she fell 30 minutes ago on wooden steps. Pain and swelling in the R arm. Pt has no other complaints at this time. Pt denies hitting her head.      Esperanza Almonte, RN  06/21/21 9793

## 2021-07-21 RX ORDER — TIZANIDINE 4 MG/1
4 TABLET ORAL EVERY 6 HOURS PRN
Qty: 360 TABLET | Refills: 2 | Status: SHIPPED | OUTPATIENT
Start: 2021-07-21 | End: 2022-04-05

## 2021-07-21 NOTE — TELEPHONE ENCOUNTER
Pt is wanting to speak to Dr. Marti Richardson or Avani about her mediation. Patient said she is having a lot of muscle spasms. patient said that her Zanaflex was decreased 1 tablet once a day, pt said she was originally on 1 tablet 4 times a day.Pt is using [facility]  [phone]

## 2021-08-24 ENCOUNTER — LAB (OUTPATIENT)
Dept: LAB | Facility: HOSPITAL | Age: 56
End: 2021-08-24

## 2021-08-24 ENCOUNTER — OFFICE VISIT (OUTPATIENT)
Dept: FAMILY MEDICINE CLINIC | Facility: CLINIC | Age: 56
End: 2021-08-24

## 2021-08-24 VITALS
OXYGEN SATURATION: 98 % | HEART RATE: 73 BPM | BODY MASS INDEX: 28.38 KG/M2 | DIASTOLIC BLOOD PRESSURE: 72 MMHG | SYSTOLIC BLOOD PRESSURE: 106 MMHG | HEIGHT: 64 IN | WEIGHT: 166.2 LBS

## 2021-08-24 DIAGNOSIS — Z79.899 HIGH RISK MEDICATION USE: ICD-10-CM

## 2021-08-24 DIAGNOSIS — Z00.00 ANNUAL PHYSICAL EXAM: Primary | ICD-10-CM

## 2021-08-24 DIAGNOSIS — E05.00 GRAVES' DISEASE: ICD-10-CM

## 2021-08-24 DIAGNOSIS — K21.9 GASTROESOPHAGEAL REFLUX DISEASE WITHOUT ESOPHAGITIS: ICD-10-CM

## 2021-08-24 DIAGNOSIS — Z12.31 ENCOUNTER FOR SCREENING MAMMOGRAM FOR MALIGNANT NEOPLASM OF BREAST: ICD-10-CM

## 2021-08-24 DIAGNOSIS — E78.00 HYPERCHOLESTEROLEMIA: ICD-10-CM

## 2021-08-24 DIAGNOSIS — M85.88 OSTEOPENIA OF LUMBAR SPINE: ICD-10-CM

## 2021-08-24 DIAGNOSIS — F33.1 MODERATE EPISODE OF RECURRENT MAJOR DEPRESSIVE DISORDER (HCC): ICD-10-CM

## 2021-08-24 DIAGNOSIS — Z23 NEED FOR SHINGLES VACCINE: ICD-10-CM

## 2021-08-24 DIAGNOSIS — F41.1 GENERALIZED ANXIETY DISORDER: ICD-10-CM

## 2021-08-24 DIAGNOSIS — R00.2 PALPITATIONS: ICD-10-CM

## 2021-08-24 PROCEDURE — 90471 IMMUNIZATION ADMIN: CPT | Performed by: NURSE PRACTITIONER

## 2021-08-24 PROCEDURE — 99396 PREV VISIT EST AGE 40-64: CPT | Performed by: NURSE PRACTITIONER

## 2021-08-24 PROCEDURE — 80306 DRUG TEST PRSMV INSTRMNT: CPT | Performed by: NURSE PRACTITIONER

## 2021-08-24 PROCEDURE — 90750 HZV VACC RECOMBINANT IM: CPT | Performed by: NURSE PRACTITIONER

## 2021-08-24 RX ORDER — LORAZEPAM 0.5 MG/1
0.5 TABLET ORAL DAILY PRN
Qty: 30 TABLET | Refills: 0 | Status: SHIPPED | OUTPATIENT
Start: 2021-08-24 | End: 2021-09-16 | Stop reason: SDUPTHER

## 2021-08-24 NOTE — PROGRESS NOTES
"Chief Complaint  Annual Exam and Anxiety    Subjective  Has extensive history of anxiety and depression and is currently on Lexapro and lorazepam.  \"I have just been trying to take the lorazepam only when I really need it past seem to be having your PACs lately.  Over the last few months have also been having this fast heart rate.  The other night I had a panic attack that woke me up in my heart rate when I checked it was in the 140s.  I was on a beta-blocker before and I had to stop it because it was making my blood pressure go too low.  Dr. Camacho knows about this.\"        Nerissa Lopez presents to [facility]  HPI:  Annual physical exam     Anxiety  Presents for follow-up visit. Symptoms include decreased concentration, depressed mood, excessive worry, irritability, malaise, muscle tension, nervous/anxious behavior, palpitations and restlessness. Patient reports no compulsions, confusion, feeling of choking, hyperventilation, obsessions, panic, shortness of breath or suicidal ideas. Symptoms occur most days. The severity of symptoms is moderate. The quality of sleep is fair. Nighttime awakenings: one to two.     Her past medical history is significant for anxiety/panic attacks and depression. Compliance with medications is %.   Thyroid Problem  Presents for follow-up visit. Symptoms include anxiety, depressed mood and palpitations. Patient reports no diaphoresis or fatigue. The symptoms have been stable. Her past medical history is significant for hyperlipidemia.   Back Pain  This is a chronic problem. The current episode started more than 1 year ago. The problem occurs constantly. The problem is unchanged. The pain is present in the lumbar spine and thoracic spine. The pain radiates to the right thigh. The pain is at a severity of 9/10. The pain is severe. The pain is the same all the time. The symptoms are aggravated by standing, sitting, stress, position and lying down. " Pertinent negatives include no fever. Risk factors include poor posture, obesity, lack of exercise and menopause. She has tried muscle relaxant, NSAIDs and analgesics for the symptoms. The treatment provided mild relief.   Depression  Visit Type: follow-up  Patient presents with the following symptoms: decreased concentration, depressed mood, excessive worry, feelings of hopelessness, irritability, malaise, muscle tension, nervousness/anxiety, palpitations and restlessness.  Patient is not experiencing: compulsions, confusion, hyperventilation, obsessions, panic, shortness of breath, suicidal ideas, suicidal planning and thoughts of death.  Frequency of symptoms: constantly   Severity: severe   Sleep quality: fair  Nighttime awakenings: several  Patient has a history of: anxiety/panic attacks    Heartburn  She complains of belching, globus sensation, heartburn and water brash. This is a chronic problem. The current episode started more than 1 year ago. The problem occurs occasionally. The problem has been unchanged. The heartburn does not wake her from sleep. The heartburn does not limit her activity. The heartburn doesn't change with position. The symptoms are aggravated by certain foods and stress. Pertinent negatives include no fatigue. Risk factors include caffeine use. She has tried a PPI for the symptoms. The treatment provided significant relief.   Hyperlipidemia  This is a chronic problem. The current episode started more than 1 year ago. The problem is controlled. Factors aggravating her hyperlipidemia include fatty foods. Pertinent negatives include no shortness of breath. Current antihyperlipidemic treatment includes diet change. The current treatment provides moderate improvement of lipids. There are no compliance problems.  Risk factors for coronary artery disease include dyslipidemia and stress.   Palpitations   This is a new problem. The current episode started more than 1 month ago. The problem  "occurs every several days. The problem has been waxing and waning. The symptoms are aggravated by caffeine and stress. Associated symptoms include anxiety and an irregular heartbeat. Pertinent negatives include no diaphoresis, fever or shortness of breath. She has tried beta blockers for the symptoms. The treatment provided significant relief. Risk factors include stress and dyslipidemia. Her past medical history is significant for anxiety.       Objective   Vital Signs:   /72   Pulse 73   Ht 162.6 cm (64\")   Wt 75.4 kg (166 lb 3.2 oz)   SpO2 98%   BMI 28.53 kg/m²     Physical Exam  Vitals and nursing note reviewed.   Constitutional:       Appearance: Normal appearance. She is well-developed and overweight.      Comments: Bilateral, manual breast exam performed and no dimpling, puckering, masses or nodules palpated     HENT:      Head: Normocephalic and atraumatic.      Right Ear: Tympanic membrane, ear canal and external ear normal.      Left Ear: Tympanic membrane, ear canal and external ear normal.   Eyes:      Pupils: Pupils are equal, round, and reactive to light.   Cardiovascular:      Rate and Rhythm: Normal rate and regular rhythm.      Heart sounds: Normal heart sounds.   Pulmonary:      Effort: Pulmonary effort is normal.      Breath sounds: Normal breath sounds.   Abdominal:      General: Bowel sounds are normal.      Palpations: Abdomen is soft.   Musculoskeletal:         General: Normal range of motion.      Cervical back: Normal range of motion and neck supple.   Skin:     General: Skin is warm.      Capillary Refill: Capillary refill takes less than 2 seconds.   Neurological:      Mental Status: She is alert and oriented to person, place, and time.   Psychiatric:         Behavior: Behavior normal.        Result Review :     Common labs    Common Labsle 2/4/21 2/4/21 2/4/21    0906 0906 0906   Glucose  81    BUN  12    Creatinine  0.71    eGFR Non African Am  86    Sodium  137    Potassium  " 4.4    Chloride  102    Calcium  9.6    Albumin  4.00    Total Bilirubin  0.3    Alkaline Phosphatase  47    AST (SGOT)  16    ALT (SGPT)  16    WBC 4.55     Hemoglobin 12.6     Hematocrit 36.3     Platelets 362     Total Cholesterol   184   Triglycerides   59   HDL Cholesterol   64 (A)   LDL Cholesterol    109 (A)   (A) Abnormal value                      Assessment and Plan    Diagnoses and all orders for this visit:    1. Annual physical exam (Primary)    2. Gastroesophageal reflux disease without esophagitis  -     CBC & Differential; Future  -     Comprehensive Metabolic Panel; Future    3. Hypercholesterolemia  -     Lipid Panel; Future    4. Graves' disease  -     TSH; Future  -     T4, free; Future    5. Generalized anxiety disorder  -     LORazepam (ATIVAN) 0.5 MG tablet; Take 1 tablet by mouth Daily As Needed for Anxiety.  Dispense: 30 tablet; Refill: 0    6. Moderate episode of recurrent major depressive disorder (CMS/HCC)    7. Palpitations  -     Holter Monitor - 72 Hour Up To 15 Days; Future    8. Osteopenia of lumbar spine  -     DEXA Bone Density Axial; Future    9. High risk medication use  -     Urine Drug Screen - Urine, Clean Catch    10. Need for shingles vaccine  -     Shingrix Vaccine    11. Encounter for screening mammogram for malignant neoplasm of breast  -     Mammo Screening Digital Tomosynthesis Bilateral With CAD; Future      1.  Annual physical exam:  Continue on current medications as previously prescribed   Counseled on importance of healthy eating habits and regular physical activity regimen on improving overall physical and mental health    2.  Generalized anxiety disorder:  Continue lorazepam as previously prescribed  Reeducated on importance of using this medication as needed for panic attacks  Reeducated on possible side effects of this medication including not limited to increased risk for drowsiness, sedation and loss of inhibition  Continue with stress reducing  techniques    3.  Gastroesophageal reflux disease without esophagitis:  Complete CBC and chemistry panel as ordered and will notify of results when available  Continue on omeprazole as previously prescribed  Reeducated on possible side effects of long-term use of PPIs including but not limited to increased risk for osteoporosis    4.  Moderate episode of recurrent major depressive disorder:  Continue on Lexapro as previously prescribed  Seek emergency medical treatment for any new or worsening thoughts of hopelessness, helplessness or self-harm    5.  Hypercholesterolemia:  Complete fasting lipid panel as ordered and will notify of results when available  Adhere to a low-fat diet  Bake or air montez foods as opposed to pan frying or deep frying     6.  Graves' disease:  Complete TSH and free T4 as ordered and will notify of results when available  Continue on Synthroid as previously prescribed    7.  Palpitations:  Cardiology will call to schedule Holter monitor and will notify of results when available  Seek emergency medical treatment for any new or worsening palpitations, chest pain  Discussed at length how anxiety and depression can result in school manifestations including palpitations  Discussed possibility of resuming beta-blocker if no improvement of symptoms    8.  Osteopenia of lumbar spine:  Radiology will call to schedule bone density test will notify of results when available  Continue on calcium supplement as previously prescribed  Increase low impact exercises such as walking    9.  High risk medication use:  Complete UDS as ordered and will notify of results when available    10.  Need for shingles vaccine:  Shingrix vaccine given IM in office  Educated on possible side effects of this medication including not limited to increased risk for pain, swelling and redness of injection site  Vaccine series complete    11.  Encounter for screening mammogram for malignant neoplasm of the breast:  Radiology will  call to schedule bilateral mammogram  Encouraged bi-monthly self breast exams at home    I spent 31 minutes caring for Nerissa on this date of service. This time includes time spent by me in the following activities:preparing for the visit, reviewing tests, obtaining and/or reviewing a separately obtained history, performing a medically appropriate examination and/or evaluation , counseling and educating the patient/family/caregiver, ordering medications, tests, or procedures and documenting information in the medical record  Follow Up   Return in about 3 months (around 11/24/2021) for Recheck.  Patient was given instructions and counseling regarding her condition or for health maintenance advice. Please see specific information pulled into the AVS if appropriate.         This document has been electronically signed by YOANA Arguello on August 24, 2021 08:52 CDT

## 2021-08-26 ENCOUNTER — LAB (OUTPATIENT)
Dept: LAB | Facility: HOSPITAL | Age: 56
End: 2021-08-26

## 2021-08-26 DIAGNOSIS — K21.9 GASTROESOPHAGEAL REFLUX DISEASE WITHOUT ESOPHAGITIS: ICD-10-CM

## 2021-08-26 DIAGNOSIS — E78.00 HYPERCHOLESTEROLEMIA: ICD-10-CM

## 2021-08-26 DIAGNOSIS — E05.00 GRAVES' DISEASE: ICD-10-CM

## 2021-08-26 PROCEDURE — 80050 GENERAL HEALTH PANEL: CPT

## 2021-08-26 PROCEDURE — 36415 COLL VENOUS BLD VENIPUNCTURE: CPT

## 2021-08-26 PROCEDURE — 80061 LIPID PANEL: CPT

## 2021-08-26 PROCEDURE — 84439 ASSAY OF FREE THYROXINE: CPT

## 2021-08-27 ENCOUNTER — TELEPHONE (OUTPATIENT)
Dept: FAMILY MEDICINE CLINIC | Facility: CLINIC | Age: 56
End: 2021-08-27

## 2021-08-27 DIAGNOSIS — E87.5 HYPERKALEMIA: Primary | ICD-10-CM

## 2021-08-27 LAB
ALBUMIN SERPL-MCNC: 4.3 G/DL (ref 3.5–5.2)
ALBUMIN/GLOB SERPL: 1.4 G/DL
ALP SERPL-CCNC: 73 U/L (ref 39–117)
ALT SERPL W P-5'-P-CCNC: 17 U/L (ref 1–33)
ANION GAP SERPL CALCULATED.3IONS-SCNC: 9.8 MMOL/L (ref 5–15)
AST SERPL-CCNC: 25 U/L (ref 1–32)
BASOPHILS # BLD AUTO: 0.05 10*3/MM3 (ref 0–0.2)
BASOPHILS NFR BLD AUTO: 1 % (ref 0–1.5)
BILIRUB SERPL-MCNC: 0.2 MG/DL (ref 0–1.2)
BUN SERPL-MCNC: 12 MG/DL (ref 6–20)
BUN/CREAT SERPL: 17.1 (ref 7–25)
CALCIUM SPEC-SCNC: 9.3 MG/DL (ref 8.6–10.5)
CHLORIDE SERPL-SCNC: 103 MMOL/L (ref 98–107)
CHOLEST SERPL-MCNC: 187 MG/DL (ref 0–200)
CO2 SERPL-SCNC: 25.2 MMOL/L (ref 22–29)
CREAT SERPL-MCNC: 0.7 MG/DL (ref 0.57–1)
DEPRECATED RDW RBC AUTO: 43.9 FL (ref 37–54)
EOSINOPHIL # BLD AUTO: 0.27 10*3/MM3 (ref 0–0.4)
EOSINOPHIL NFR BLD AUTO: 5.3 % (ref 0.3–6.2)
ERYTHROCYTE [DISTWIDTH] IN BLOOD BY AUTOMATED COUNT: 12.5 % (ref 12.3–15.4)
GFR SERPL CREATININE-BSD FRML MDRD: 87 ML/MIN/1.73
GLOBULIN UR ELPH-MCNC: 3.1 GM/DL
GLUCOSE SERPL-MCNC: 77 MG/DL (ref 65–99)
HCT VFR BLD AUTO: 40.6 % (ref 34–46.6)
HDLC SERPL-MCNC: 77 MG/DL (ref 40–60)
HGB BLD-MCNC: 13.1 G/DL (ref 12–15.9)
IMM GRANULOCYTES # BLD AUTO: 0.01 10*3/MM3 (ref 0–0.05)
IMM GRANULOCYTES NFR BLD AUTO: 0.2 % (ref 0–0.5)
LDLC SERPL CALC-MCNC: 99 MG/DL (ref 0–100)
LDLC/HDLC SERPL: 1.27 {RATIO}
LYMPHOCYTES # BLD AUTO: 1.38 10*3/MM3 (ref 0.7–3.1)
LYMPHOCYTES NFR BLD AUTO: 27.1 % (ref 19.6–45.3)
MCH RBC QN AUTO: 30.8 PG (ref 26.6–33)
MCHC RBC AUTO-ENTMCNC: 32.3 G/DL (ref 31.5–35.7)
MCV RBC AUTO: 95.3 FL (ref 79–97)
MONOCYTES # BLD AUTO: 0.56 10*3/MM3 (ref 0.1–0.9)
MONOCYTES NFR BLD AUTO: 11 % (ref 5–12)
NEUTROPHILS NFR BLD AUTO: 2.82 10*3/MM3 (ref 1.7–7)
NEUTROPHILS NFR BLD AUTO: 55.4 % (ref 42.7–76)
NRBC BLD AUTO-RTO: 0 /100 WBC (ref 0–0.2)
PLATELET # BLD AUTO: 303 10*3/MM3 (ref 140–450)
PMV BLD AUTO: 10.1 FL (ref 6–12)
POTASSIUM SERPL-SCNC: 5.7 MMOL/L (ref 3.5–5.2)
PROT SERPL-MCNC: 7.4 G/DL (ref 6–8.5)
RBC # BLD AUTO: 4.26 10*6/MM3 (ref 3.77–5.28)
SODIUM SERPL-SCNC: 138 MMOL/L (ref 136–145)
T4 FREE SERPL-MCNC: 1.01 NG/DL (ref 0.93–1.7)
TRIGL SERPL-MCNC: 60 MG/DL (ref 0–150)
TSH SERPL DL<=0.05 MIU/L-ACNC: 1.97 UIU/ML (ref 0.27–4.2)
VLDLC SERPL-MCNC: 11 MG/DL (ref 5–40)
WBC # BLD AUTO: 5.09 10*3/MM3 (ref 3.4–10.8)

## 2021-08-27 NOTE — TELEPHONE ENCOUNTER
Per YOANA Kidd, Ms. Lopez has been called with recent lab results & recommendations.  Continue current medications and follow-up as planned or sooner if any problems.       ----- Message from YOANA Arguello sent at 8/27/2021  6:54 AM CDT -----  Her potassium was elevated.  She needs to decrease potassium rich foods such as potato skins, bananas and leafy green vegetables.  I would like her to have her potassium level repeated in 2 weeks.  All other labs were essentially normal.

## 2021-08-30 ENCOUNTER — TELEPHONE (OUTPATIENT)
Dept: FAMILY MEDICINE CLINIC | Facility: CLINIC | Age: 56
End: 2021-08-30

## 2021-08-30 NOTE — TELEPHONE ENCOUNTER
Avani    Ms. Lopez has called wanting to make sure you know that she needs a PA on her Ativan.    Something about her insurance has a new payee?     I told her that PA's can take anywhere from 24-72 hours to complete but I would let you know.     GREG Faulkner

## 2021-09-16 ENCOUNTER — OFFICE VISIT (OUTPATIENT)
Dept: FAMILY MEDICINE CLINIC | Facility: CLINIC | Age: 56
End: 2021-09-16

## 2021-09-16 ENCOUNTER — LAB (OUTPATIENT)
Dept: LAB | Facility: HOSPITAL | Age: 56
End: 2021-09-16

## 2021-09-16 VITALS
HEIGHT: 64 IN | BODY MASS INDEX: 28.46 KG/M2 | HEART RATE: 72 BPM | WEIGHT: 166.7 LBS | DIASTOLIC BLOOD PRESSURE: 72 MMHG | SYSTOLIC BLOOD PRESSURE: 110 MMHG | OXYGEN SATURATION: 98 %

## 2021-09-16 DIAGNOSIS — F41.1 GENERALIZED ANXIETY DISORDER: Primary | ICD-10-CM

## 2021-09-16 DIAGNOSIS — E87.5 HYPERKALEMIA: ICD-10-CM

## 2021-09-16 DIAGNOSIS — Z79.891 OPIOID CONTRACT EXISTS: ICD-10-CM

## 2021-09-16 PROCEDURE — 99212 OFFICE O/P EST SF 10 MIN: CPT | Performed by: NURSE PRACTITIONER

## 2021-09-16 PROCEDURE — 80048 BASIC METABOLIC PNL TOTAL CA: CPT

## 2021-09-16 RX ORDER — CARVEDILOL 3.12 MG/1
3.12 TABLET ORAL 2 TIMES DAILY
COMMUNITY
Start: 2021-09-02 | End: 2021-11-15 | Stop reason: ALTCHOICE

## 2021-09-16 RX ORDER — NALOXONE HYDROCHLORIDE 4 MG/.1ML
1 SPRAY NASAL AS NEEDED
Qty: 1 EACH | Refills: 1 | Status: SHIPPED | OUTPATIENT
Start: 2021-09-16

## 2021-09-16 RX ORDER — NALOXONE HYDROCHLORIDE 0.4 MG/ML
0.4 INJECTION, SOLUTION INTRAMUSCULAR; INTRAVENOUS; SUBCUTANEOUS
Status: CANCELLED | OUTPATIENT
Start: 2021-09-16

## 2021-09-16 RX ORDER — METHIMAZOLE 5 MG/1
5 TABLET ORAL DAILY
COMMUNITY
End: 2021-11-15

## 2021-09-16 RX ORDER — ESCITALOPRAM OXALATE 10 MG/1
TABLET ORAL
COMMUNITY
End: 2021-09-16

## 2021-09-16 RX ORDER — LORAZEPAM 0.5 MG/1
0.5 TABLET ORAL DAILY PRN
Qty: 30 TABLET | Refills: 0 | Status: SHIPPED | OUTPATIENT
Start: 2021-09-16

## 2021-09-16 NOTE — PROGRESS NOTES
"Chief Complaint  Med Refill    Subjective  Patient has been on Norco for chronic pain for several years and has been taking lorazepam for anxiety for several years.  Insurance company recently requested a prescription for Narcan be supplied with instructions on use in order to reduce the risk of opioid-induced overdose.  Her Norco as prescribed per pain management but she receives her lorazepam through this office.        Nerissa Lopez presents to [facility] [place]  Anxiety  Presents for follow-up visit. Patient reports no feeling of choking. Symptoms occur most days. The severity of symptoms is moderate. The quality of sleep is fair. Nighttime awakenings: one to two.     Compliance with medications is %.   Back Pain  This is a chronic problem. The current episode started more than 1 year ago. The problem occurs intermittently. The problem is unchanged. The pain is present in the lumbar spine and thoracic spine. The pain radiates to the right thigh. The pain is at a severity of 7/10. The pain is moderate. The pain is the same all the time. The symptoms are aggravated by standing, sitting, stress, position and lying down. Risk factors include poor posture, obesity, lack of exercise and menopause. Treatments tried: Norco  The treatment provided moderate relief.       Objective   Vital Signs:   /72   Pulse 72   Ht 162.6 cm (64\")   Wt 75.6 kg (166 lb 11.2 oz)   SpO2 98%   BMI 28.61 kg/m²     Physical Exam  Vitals and nursing note reviewed.   Constitutional:       Appearance: She is well-developed.   Cardiovascular:      Rate and Rhythm: Normal rate and regular rhythm.      Heart sounds: Normal heart sounds.   Pulmonary:      Effort: Pulmonary effort is normal.      Breath sounds: Normal breath sounds.   Musculoskeletal:         General: Normal range of motion.   Skin:     General: Skin is warm.   Neurological:      Mental Status: She is alert and oriented " to person, place, and time.   Psychiatric:         Behavior: Behavior normal.        Result Review :                 Assessment and Plan    Diagnoses and all orders for this visit:    1. Generalized anxiety disorder (Primary)  -     LORazepam (ATIVAN) 0.5 MG tablet; Take 1 tablet by mouth Daily As Needed for Anxiety.  Dispense: 30 tablet; Refill: 0    2. Opioid contract exists  -     naloxone (NARCAN) 4 MG/0.1ML nasal spray; 1 spray into the nostril(s) as directed by provider As Needed (opiod overdose).  Dispense: 1 each; Refill: 1    Other orders  -     Cancel: naloxone (NARCAN) 0.4 MG/ML injection; Infuse 1 mL into a venous catheter Every 5 (Five) Minutes As Needed for Opioid Reversal.      1.  Generalized anxiety disorder:  Continue lorazepam as previously prescribed and refill prescription sent to pharmacy  Currently only uses on a as needed basis and is able to usually make one prescription last several months  Discussed at length possible increased risk of respiratory depression and increased risk of opioid overdose if taken in combination with Norco    2.  Opioid contract exists:  Prescription for Narcan provided to patient  Educated on use of Narcan  Discussed importance of seeking emergency medical treatment should use of Narcan be required    I spent 13 minutes caring for Nerissa on this date of service. This time includes time spent by me in the following activities:preparing for the visit, obtaining and/or reviewing a separately obtained history, performing a medically appropriate examination and/or evaluation , counseling and educating the patient/family/caregiver and documenting information in the medical record  Follow Up {Instructions Charge Capture  Follow-up Communications :23}  Return if symptoms worsen or fail to improve, for Next scheduled follow up.  Patient was given instructions and counseling regarding her condition or for health maintenance advice. Please see specific information pulled  into the AVS if appropriate.         This document has been electronically signed by YOANA Arguello on September 16, 2021 13:52 CDT

## 2021-09-16 NOTE — PATIENT INSTRUCTIONS
Naloxone injection  What is this medicine?  NALOXONE (nal OX one) is a narcotic blocker. It is used to treat narcotic (opioid) drug overdose. It is used to temporarily reverse the effects of opioid medicines. This medicine has no effect in people who are not taking opioid medicines.  This medicine may be used for other purposes; ask your health care provider or pharmacist if you have questions.  COMMON BRAND NAME(S): Jena SWEET  What should I tell my health care provider before I take this medicine?  They need to know if you have any of these conditions:  · drug abuse or addiction  · heart disease  · an unusual or allergic reaction to naloxone, other medicines, foods, dyes, or preservatives  · pregnant or trying to get pregnantbreast-feeding  How should I use this medicine?  This medicine may be administered in a hospital, clinic, or can be used by the public to give aid to a person who has overdosed until emergency medical help is available. This medicine is for injection into the outer thigh. It can be injected through clothing if needed. Get emergency medical help right away after giving the first dose of this medicine, even if the person wakes up. You should be familiar with how to recognize the signs and symptoms of a narcotic overdose. Administer according to the printed instructions on the device label or the electronic voice instructions. You should practice using the Trainer injector before this medicine is needed.  Talk to your pediatrician regarding the use of this medicine in children. While this drug may be prescribed for children as young as  for selected conditions, precautions do apply. For infants less than 1 year of age, pinch the thigh muscle while administering.  Overdosage: If you think you have taken too much of this medicine contact a poison control center or emergency room at once.  NOTE: This medicine is only for you. Do not share this medicine with others.  What if I miss a  dose?  This does not apply.  What may interact with this medicine?  This medicine is only used during an emergency. No interactions are expected during emergency use.  This list may not describe all possible interactions. Give your health care provider a list of all the medicines, herbs, non-prescription drugs, or dietary supplements you use. Also tell them if you smoke, drink alcohol, or use illegal drugs. Some items may interact with your medicine.  What should I watch for while using this medicine?  Keep this medicine ready for use in the case of a narcotic overdose. Make sure that you have the phone number of your doctor or health care professional and local hospital ready. You may need to have additional doses of this medicine. Each injector contains a single dose. Some emergencies may require additional doses.  After use, bring the treated person to the nearest hospital or call 911. Make sure the treating health care professional knows that the person has received an injection of this medicine. You will receive additional instructions on what to do during and after use of this medicine before an emergency occurs.  What side effects may I notice from receiving this medicine?  Side effects that you should report to your doctor or health care professional as soon as possible:  · allergic reactions like skin rash, itching or hives, swelling of the face, lips, or tongue  · breathing problems  · fast, irregular heartbeat  · high blood pressure  · pain that was controlled by narcotic pain medicine  · seizures  Side effects that usually do not require medical attention (report to your doctor or health care professional if they continue or are bothersome):  · anxious  · chills  · diarrhea  · fever  · nausea, vomiting  · sweating  This list may not describe all possible side effects. Call your doctor for medical advice about side effects. You may report side effects to FDA at 9-000-FDA-5612.  Where should I keep my  medicine?  Keep out of the reach of children.  Store at room temperature between 15 and 25 degrees C (59 and 77 degrees F). If you are using this medicine at home, you will be instructed on how to store this medicine. Keep this medicine in its outer case until ready to use. Occasionally check the solution through the viewing window of the injector. The solution should be clear. If it is discolored, cloudy, or contains solid particles, replace it with a new injector. Remember to check the expiration date of this medicine regularly. Throw away any unused medicine after the expiration date.  NOTE: This sheet is a summary. It may not cover all possible information. If you have questions about this medicine, talk to your doctor, pharmacist, or health care provider.  © 2021 [employer]/Gold Standard (2016-12-27 16:45:38)

## 2021-09-17 LAB
ANION GAP SERPL CALCULATED.3IONS-SCNC: 10.7 MMOL/L (ref 5–15)
BUN SERPL-MCNC: 10 MG/DL (ref 6–20)
BUN/CREAT SERPL: 14.3 (ref 7–25)
CALCIUM SPEC-SCNC: 9.1 MG/DL (ref 8.6–10.5)
CHLORIDE SERPL-SCNC: 100 MMOL/L (ref 98–107)
CO2 SERPL-SCNC: 27.3 MMOL/L (ref 22–29)
CREAT SERPL-MCNC: 0.7 MG/DL (ref 0.57–1)
GFR SERPL CREATININE-BSD FRML MDRD: 87 ML/MIN/1.73
GLUCOSE SERPL-MCNC: 66 MG/DL (ref 65–99)
POTASSIUM SERPL-SCNC: 4 MMOL/L (ref 3.5–5.2)
SODIUM SERPL-SCNC: 138 MMOL/L (ref 136–145)

## 2021-09-18 DIAGNOSIS — M81.6 LOCALIZED OSTEOPOROSIS WITHOUT CURRENT PATHOLOGICAL FRACTURE: Primary | ICD-10-CM

## 2021-09-20 ENCOUNTER — TELEPHONE (OUTPATIENT)
Dept: FAMILY MEDICINE CLINIC | Facility: CLINIC | Age: 56
End: 2021-09-20

## 2021-09-20 NOTE — TELEPHONE ENCOUNTER
Per YOANA Kidd, Ms. Lopez has been called with recent lab results & recommendations.  Continue current medications and follow-up as planned or sooner if any problems.       ----- Message from YOANA Arguello sent at 9/18/2021  4:58 PM CDT -----  Labs normal, please call or send card!

## 2021-09-22 ENCOUNTER — TELEPHONE (OUTPATIENT)
Dept: FAMILY MEDICINE CLINIC | Facility: CLINIC | Age: 56
End: 2021-09-22

## 2021-11-15 ENCOUNTER — OFFICE VISIT (OUTPATIENT)
Dept: FAMILY MEDICINE CLINIC | Facility: CLINIC | Age: 56
End: 2021-11-15

## 2021-11-15 VITALS
DIASTOLIC BLOOD PRESSURE: 66 MMHG | HEIGHT: 64 IN | SYSTOLIC BLOOD PRESSURE: 112 MMHG | WEIGHT: 166 LBS | BODY MASS INDEX: 28.34 KG/M2 | OXYGEN SATURATION: 98 % | HEART RATE: 72 BPM

## 2021-11-15 DIAGNOSIS — E03.9 HYPOTHYROIDISM, UNSPECIFIED TYPE: ICD-10-CM

## 2021-11-15 DIAGNOSIS — F33.1 MODERATE EPISODE OF RECURRENT MAJOR DEPRESSIVE DISORDER (HCC): ICD-10-CM

## 2021-11-15 DIAGNOSIS — E78.00 HYPERCHOLESTEREMIA: ICD-10-CM

## 2021-11-15 DIAGNOSIS — G89.29 CHRONIC PAIN OF RIGHT HIP: ICD-10-CM

## 2021-11-15 DIAGNOSIS — F41.1 GENERALIZED ANXIETY DISORDER: Primary | ICD-10-CM

## 2021-11-15 DIAGNOSIS — M25.551 CHRONIC PAIN OF RIGHT HIP: ICD-10-CM

## 2021-11-15 DIAGNOSIS — M43.16 SPONDYLOLISTHESIS OF LUMBAR REGION: ICD-10-CM

## 2021-11-15 DIAGNOSIS — K21.9 GASTROESOPHAGEAL REFLUX DISEASE WITHOUT ESOPHAGITIS: ICD-10-CM

## 2021-11-15 PROCEDURE — 99213 OFFICE O/P EST LOW 20 MIN: CPT | Performed by: NURSE PRACTITIONER

## 2021-11-15 RX ORDER — ESCITALOPRAM OXALATE 10 MG/1
10 TABLET ORAL DAILY
Qty: 90 TABLET | Refills: 1 | Status: SHIPPED | OUTPATIENT
Start: 2021-11-15 | End: 2022-07-08 | Stop reason: SDUPTHER

## 2021-11-15 RX ORDER — MELOXICAM 15 MG/1
15 TABLET ORAL DAILY
Qty: 90 TABLET | Refills: 3 | Status: SHIPPED | OUTPATIENT
Start: 2021-11-15 | End: 2022-05-16

## 2021-11-15 RX ORDER — OMEPRAZOLE 20 MG/1
20 CAPSULE, DELAYED RELEASE ORAL DAILY
Qty: 90 CAPSULE | Refills: 3 | Status: SHIPPED | OUTPATIENT
Start: 2021-11-15 | End: 2022-10-12 | Stop reason: DRUGHIGH

## 2021-11-15 RX ORDER — LEVOTHYROXINE SODIUM 0.03 MG/1
25 TABLET ORAL DAILY
Qty: 90 TABLET | Refills: 3 | Status: SHIPPED | OUTPATIENT
Start: 2021-11-15 | End: 2022-05-03

## 2021-11-15 NOTE — PROGRESS NOTES
Chief Complaint  Anxiety (3 month check up, generalized anxiety disorder)    Subjective          Nerissa Lopez presents to [facility] [place]  Anxiety  Presents for follow-up visit. Symptoms include decreased concentration, depressed mood, excessive worry, irritability, malaise, muscle tension and restlessness. Patient reports no compulsions, confusion, feeling of choking, hyperventilation, obsessions, panic or suicidal ideas. Symptoms occur most days. The severity of symptoms is moderate. The quality of sleep is fair. Nighttime awakenings: one to two.     Her past medical history is significant for depression. Compliance with medications is %.   Thyroid Problem  Presents for follow-up visit. Symptoms include depressed mood. Patient reports no fatigue. The symptoms have been stable. Her past medical history is significant for hyperlipidemia.   Back Pain  This is a chronic problem. The current episode started more than 1 year ago. The problem occurs constantly. The problem is unchanged. The pain is present in the lumbar spine and thoracic spine. The pain radiates to the right thigh. The pain is at a severity of 9/10. The pain is severe. The pain is the same all the time. The symptoms are aggravated by standing, sitting, stress, position and lying down. Risk factors include poor posture, obesity, lack of exercise and menopause. She has tried muscle relaxant, NSAIDs and analgesics for the symptoms. The treatment provided mild relief.   Depression  Visit Type: follow-up  Patient presents with the following symptoms: decreased concentration, depressed mood, excessive worry, feelings of hopelessness, irritability, malaise, muscle tension and restlessness.  Patient is not experiencing: compulsions, confusion, hyperventilation, obsessions, panic, suicidal ideas, suicidal planning and thoughts of death.  Frequency of symptoms: constantly   Severity: severe   Sleep  "quality: fair  Nighttime awakenings: several    Heartburn  She complains of belching, globus sensation, heartburn and water brash. This is a chronic problem. The current episode started more than 1 year ago. The problem occurs occasionally. The problem has been unchanged. The heartburn does not wake her from sleep. The heartburn does not limit her activity. The heartburn doesn't change with position. The symptoms are aggravated by certain foods and stress. Pertinent negatives include no fatigue. Risk factors include caffeine use. She has tried a PPI for the symptoms. The treatment provided significant relief.   Hyperlipidemia  This is a chronic problem. The current episode started more than 1 year ago. The problem is controlled. Factors aggravating her hyperlipidemia include fatty foods. Current antihyperlipidemic treatment includes diet change. The current treatment provides moderate improvement of lipids. There are no compliance problems.  Risk factors for coronary artery disease include dyslipidemia and stress.       Objective   Vital Signs:   /66   Pulse 72   Ht 162.6 cm (64\")   Wt 75.3 kg (166 lb)   SpO2 98%   BMI 28.49 kg/m²     Physical Exam  Vitals and nursing note reviewed.   Constitutional:       Appearance: She is well-developed.   HENT:      Head: Normocephalic.      Right Ear: External ear normal.      Left Ear: External ear normal.   Eyes:      Pupils: Pupils are equal, round, and reactive to light.   Cardiovascular:      Rate and Rhythm: Normal rate and regular rhythm.      Heart sounds: Normal heart sounds.   Pulmonary:      Effort: Pulmonary effort is normal.      Breath sounds: Normal breath sounds.   Abdominal:      General: Bowel sounds are normal.      Palpations: Abdomen is soft.   Musculoskeletal:         General: Normal range of motion.      Cervical back: Normal range of motion and neck supple.   Skin:     General: Skin is warm.      Capillary Refill: Capillary refill takes less " than 2 seconds.   Neurological:      Mental Status: She is alert and oriented to person, place, and time.   Psychiatric:         Behavior: Behavior normal.        Result Review :     Common labs    Common Labsle 2/4/21 2/4/21 2/4/21 8/26/21 8/26/21 8/26/21 9/16/21    0906 0906 0906 0814 0814 0814    Glucose  81    77 66   BUN  12    12 10   Creatinine  0.71    0.70 0.70   eGFR Non  Am  86    87 87   Sodium  137    138 138   Potassium  4.4    5.7 (A) 4.0   Chloride  102    103 100   Calcium  9.6    9.3 9.1   Albumin  4.00    4.30    Total Bilirubin  0.3    0.2    Alkaline Phosphatase  47    73    AST (SGOT)  16    25    ALT (SGPT)  16    17    WBC 4.55   5.09      Hemoglobin 12.6   13.1      Hematocrit 36.3   40.6      Platelets 362   303      Total Cholesterol   184  187     Triglycerides   59  60     HDL Cholesterol   64 (A)  77 (A)     LDL Cholesterol    109 (A)  99     (A) Abnormal value                      Assessment and Plan    Diagnoses and all orders for this visit:    1. Generalized anxiety disorder (Primary)  -     escitalopram (LEXAPRO) 10 MG tablet; Take 1 tablet by mouth Daily.  Dispense: 90 tablet; Refill: 1    2. Gastroesophageal reflux disease without esophagitis  -     omeprazole (priLOSEC) 20 MG capsule; Take 1 capsule by mouth Daily.  Dispense: 90 capsule; Refill: 3    3. Moderate episode of recurrent major depressive disorder (HCC)    4. Hypercholesteremia    5. Hypothyroidism, unspecified type  -     levothyroxine (SYNTHROID, LEVOTHROID) 25 MCG tablet; Take 1 tablet by mouth Daily.  Dispense: 90 tablet; Refill: 3    6. Chronic pain of right hip  -     meloxicam (MOBIC) 15 MG tablet; Take 1 tablet by mouth Daily.  Dispense: 90 tablet; Refill: 3    7. Spondylolisthesis of lumbar region      1.  Generalized anxiety disorder:  Continue lorazepam as previously prescribed     2.  Gastroesophageal reflux disease without esophagitis:  Continue on omeprazole as previously prescribed  Avoid  triggers such as stress, spicy, fried, greasy foods     3.  Moderate episode of recurrent major depressive disorder:  Continue on Lexapro as previously prescribed  Seek emergency medical treatment for any new or worsening thoughts of hopelessness, helplessness or self-harm     4.  Hypercholesterolemia:  Complete fasting lipid panel as ordered and will notify of results when available  Continue to adhere to a diet low in saturated fats     5.    Hypothyroidism, unspecified type:  Continue on Synthroid as previously prescribed    6.  Spondylolisthesis of lumbar spine:  Continue on meloxicam as previously prescribed and refill prescription sent to pharmacy  Reeducated on importance of taking this medication with food in order to reduce gastrointestinal discomfort     I spent 20 minutes caring for Nerissa on this date of service. This time includes time spent by me in the following activities:preparing for the visit, reviewing tests, obtaining and/or reviewing a separately obtained history, performing a medically appropriate examination and/or evaluation , counseling and educating the patient/family/caregiver, ordering medications, tests, or procedures and documenting information in the medical record  Follow Up   Return in about 6 months (around 5/15/2022) for Recheck.  Patient was given instructions and counseling regarding her condition or for health maintenance advice. Please see specific information pulled into the AVS if appropriate.         This document has been electronically signed by YOANA Arguello on November 15, 2021 08:23 CST

## 2021-11-29 NOTE — TELEPHONE ENCOUNTER
Per YOANA Kidd,  has been called with recent DEXA Bone Density Scan results & recommendations.  Continue current medications and follow-up as planned or sooner if any problems.         ----- Message from YOANA Arguello sent at 9/18/2021  5:00 PM CDT -----  Bone density test did show osteoporosis.  She needs to make sure she is taking her calcium supplement with vitamin D at least twice daily with meals.  I have also sent in a referral to the bone health clinic for further treatment options.  They will contact her to schedule an appointment.    
MONI Shaw

## 2022-02-18 RX ORDER — FLUTICASONE PROPIONATE 50 MCG
2 SPRAY, SUSPENSION (ML) NASAL DAILY
Qty: 16 G | Refills: 2 | Status: SHIPPED | OUTPATIENT
Start: 2022-02-18

## 2022-03-31 ENCOUNTER — OFFICE VISIT (OUTPATIENT)
Dept: FAMILY MEDICINE CLINIC | Facility: CLINIC | Age: 57
End: 2022-03-31

## 2022-03-31 VITALS
SYSTOLIC BLOOD PRESSURE: 114 MMHG | OXYGEN SATURATION: 98 % | DIASTOLIC BLOOD PRESSURE: 62 MMHG | RESPIRATION RATE: 24 BRPM | BODY MASS INDEX: 29.79 KG/M2 | HEIGHT: 64 IN | HEART RATE: 68 BPM | WEIGHT: 174.5 LBS

## 2022-03-31 DIAGNOSIS — M79.671 RIGHT FOOT PAIN: Primary | ICD-10-CM

## 2022-03-31 PROCEDURE — 99213 OFFICE O/P EST LOW 20 MIN: CPT | Performed by: NURSE PRACTITIONER

## 2022-03-31 NOTE — PROGRESS NOTES
Chief Complaint  right foot swollen and sore neck    Subjective          Nerissa Lopez presents to [facility] [place] with right foot pain that is located in her arch, she has had this for about two months and  it is not getting better. Area is swollen and uncomfortable to walk on. She does not recall stepping on anything or having any injury to foot.           Pain  This is a new problem. The current episode started more than 1 month ago. The problem occurs constantly. The problem has been gradually worsening. Associated symptoms include arthralgias and myalgias. Nothing aggravates the symptoms. She has tried acetaminophen for the symptoms. The treatment provided no relief.     Outpatient Medications Prior to Visit   Medication Sig Dispense Refill   • Ascorbic Acid (Vitamin C) 500 MG capsule Take 1 each by mouth Daily.     • aspirin 81 MG EC tablet Take 81 mg by mouth Daily.     • Calcium Carbonate 1500 (600 Ca) MG tablet Take 1 tablet by mouth 2 (Two) Times a Day With Meals. 180 tablet 2   • cholecalciferol (VITAMIN D3) 25 MCG (1000 UT) tablet Take 1,000 Units by mouth Daily.     • coenzyme Q10 100 MG capsule Take 100 mg by mouth Daily.     • cyanocobalamin (VITAMIN B-12) 500 MCG tablet Take 500 mcg by mouth Daily.     • Diclofenac Sodium (VOLTAREN) 1 % gel gel Apply to painful joints BID x 2 wks then PRN     • escitalopram (LEXAPRO) 10 MG tablet Take 1 tablet by mouth Daily. 90 tablet 1   • fluticasone (FLONASE) 50 MCG/ACT nasal spray 2 sprays into the nostril(s) as directed by provider Daily. Administer 2 sprays in each nostril for each dose. 16 g 2   • HYDROcodone-acetaminophen (NORCO) 5-325 MG per tablet Take 1 tablet by mouth.     • levothyroxine (SYNTHROID, LEVOTHROID) 25 MCG tablet Take 1 tablet by mouth Daily. 90 tablet 3   • lidocaine (XYLOCAINE) 5 % ointment Apply  topically to the appropriate area as directed Every 2 (Two) Hours As Needed for Mild Pain .  "50 g 1   • LORazepam (ATIVAN) 0.5 MG tablet Take 1 tablet by mouth Daily As Needed for Anxiety. 30 tablet 0   • magnesium oxide (MAGOX) 400 (241.3 Mg) MG tablet tablet Take 800 mg by mouth Daily.     • meloxicam (MOBIC) 15 MG tablet Take 1 tablet by mouth Daily. 90 tablet 3   • Multiple Vitamins-Minerals (MULTIVITAMIN PO) Take 1 tablet by mouth daily.     • naloxone (NARCAN) 4 MG/0.1ML nasal spray 1 spray into the nostril(s) as directed by provider As Needed (opiod overdose). 1 each 1   • Omega-3 Fatty Acids (FISH OIL CONCENTRATE) 1000 MG capsule Take 1 capsule by mouth 2 (two) times a day.     • omeprazole (priLOSEC) 20 MG capsule Take 1 capsule by mouth Daily. 90 capsule 3   • polyethylene glycol (MIRALAX) 17 GM/SCOOP powder Take 17 g by mouth Daily. 765 g 5   • tiZANidine (ZANAFLEX) 4 MG tablet Take 1 tablet by mouth Every 6 (Six) Hours As Needed for Muscle Spasms. 360 tablet 2   • vitamin E 400 UNIT capsule Take 400 Units by mouth daily.       No facility-administered medications prior to visit.       Review of Systems   Musculoskeletal: Positive for arthralgias and myalgias.         Objective   Vital Signs:   Visit Vitals  /62 (BP Location: Right arm, Patient Position: Sitting, Cuff Size: Adult)   Pulse 68   Resp 24   Ht 162.6 cm (64\")   Wt 79.2 kg (174 lb 8 oz)   LMP  (LMP Unknown)   SpO2 98%   BMI 29.95 kg/m²     Physical Exam  Vitals and nursing note reviewed.   Constitutional:       Appearance: She is well-developed.   HENT:      Head: Normocephalic and atraumatic.   Eyes:      General: Lids are normal.      Conjunctiva/sclera: Conjunctivae normal.   Neck:      Thyroid: No thyroid mass or thyromegaly.      Trachea: Trachea normal. No tracheal tenderness.   Cardiovascular:      Rate and Rhythm: Normal rate.      Pulses: Normal pulses.      Heart sounds: Normal heart sounds.   Pulmonary:      Effort: Pulmonary effort is normal. No respiratory distress.      Breath sounds: Normal breath sounds. No " wheezing.   Abdominal:      General: There is no distension.      Palpations: Abdomen is soft. There is no mass.   Musculoskeletal:         General: Normal range of motion.      Cervical back: Normal range of motion. No edema.        Feet:    Feet:      Right foot:      Skin integrity: Skin integrity normal.      Comments: Swelling present, no redness noted   Lymphadenopathy:      Head:      Right side of head: No submental, submandibular or tonsillar adenopathy.      Left side of head: No submental, submandibular or tonsillar adenopathy.   Skin:     General: Skin is warm and dry.      Coloration: Skin is not pale.      Findings: No abrasion, erythema or lesion.   Neurological:      Mental Status: She is alert and oriented to person, place, and time.   Psychiatric:         Mood and Affect: Mood is not anxious. Affect is not inappropriate.         Speech: Speech normal.         Behavior: Behavior normal.         Thought Content: Thought content normal.         Judgment: Judgment normal. Judgment is not impulsive.        Result Review :                 Assessment and Plan    Diagnoses and all orders for this visit:    1. Right foot pain (Primary)  -     XR Foot 3+ View Right; Future      Declined steroid due to side effects    We will xray today and call her with results    Please call the office if you have any issues         Follow Up   Return if symptoms worsen or fail to improve, for Next scheduled follow up, Recheck.  Patient was given instructions and counseling regarding her condition or for health maintenance advice. Please see specific information pulled into the AVS if appropriate.           This document has been electronically signed by YOANA Callejas on March 31, 2022 11:21 CDT

## 2022-04-04 DIAGNOSIS — M77.31 CALCANEAL SPUR OF RIGHT FOOT: Primary | ICD-10-CM

## 2022-04-05 RX ORDER — TIZANIDINE 4 MG/1
4 TABLET ORAL EVERY 6 HOURS PRN
Qty: 360 TABLET | Refills: 2 | Status: SHIPPED | OUTPATIENT
Start: 2022-04-05 | End: 2022-12-29 | Stop reason: SDUPTHER

## 2022-04-05 NOTE — TELEPHONE ENCOUNTER
Incoming Refill Request      Medication requested (name and dose):    tiZANidine (ZANAFLEX) 4 MG tablet         Pharmacy where request should be sent: [facility]     Additional details provided by patient:     Best call back number:[phone]     Does the patient have less than a 3 day supply:  [] Yes  [x] No    Malgorzata Shaikh Rep  04/05/22, 16:40 CDT

## 2022-04-12 ENCOUNTER — TRANSCRIBE ORDERS (OUTPATIENT)
Dept: PHYSICAL THERAPY | Facility: HOSPITAL | Age: 57
End: 2022-04-12

## 2022-04-12 DIAGNOSIS — Z96.652 STATUS POST TOTAL KNEE REPLACEMENT, LEFT: Primary | ICD-10-CM

## 2022-04-25 ENCOUNTER — TRANSCRIBE ORDERS (OUTPATIENT)
Dept: HOME HEALTH SERVICES | Facility: HOME HEALTHCARE | Age: 57
End: 2022-04-25

## 2022-04-25 ENCOUNTER — HOME HEALTH ADMISSION (OUTPATIENT)
Dept: HOME HEALTH SERVICES | Facility: HOME HEALTHCARE | Age: 57
End: 2022-04-25

## 2022-04-25 DIAGNOSIS — Z96.652 AFTERCARE FOLLOWING LEFT KNEE JOINT REPLACEMENT SURGERY: Primary | ICD-10-CM

## 2022-04-25 DIAGNOSIS — Z47.1 AFTERCARE FOLLOWING LEFT KNEE JOINT REPLACEMENT SURGERY: Primary | ICD-10-CM

## 2022-04-26 ENCOUNTER — HOME CARE VISIT (OUTPATIENT)
Dept: HOME HEALTH SERVICES | Facility: CLINIC | Age: 57
End: 2022-04-26

## 2022-04-26 PROCEDURE — G0151 HHCP-SERV OF PT,EA 15 MIN: HCPCS

## 2022-04-26 NOTE — HOME HEALTH
"PATIENT HAD A LEFT TKA ON 4/25/2022 SECONDARY TO SEVERE ARTHRITIS LEFT KNEE. SHE HAS HAD PAIN FOR A LONG TIME LIMITING HER AT THIS TIME. PATIENT HAD A SAME DAY SURGERY AND CAME HOME THE SAME NIGHT AS HER SURGERY. SHE IS DOING OK SINCE BEING HOME AT THIS TIME.     PMHX: GRAVES DISEASE, OSTEOPOROSIS, GERD, ANXIETY, MYOPIA, HYPERCHOLESTOREMIA    PATIENT GOAL\" GET HER KNEE BETTER\"    PRIOR LEVEL OF FUNCTION: PATIENT WAS INDEPENDENT WITH ALL ACTIVITY     PATIENT DOES HAVE SOME NUMBNESS AROUND HER KNEE PROBABALY SECONDARY TO THE NERVE BLOCK AT THIS TIME"

## 2022-04-27 ENCOUNTER — HOME CARE VISIT (OUTPATIENT)
Dept: HOME HEALTH SERVICES | Facility: CLINIC | Age: 57
End: 2022-04-27

## 2022-04-27 VITALS
SYSTOLIC BLOOD PRESSURE: 108 MMHG | RESPIRATION RATE: 16 BRPM | TEMPERATURE: 98.3 F | HEART RATE: 74 BPM | DIASTOLIC BLOOD PRESSURE: 62 MMHG | OXYGEN SATURATION: 98 %

## 2022-04-27 PROCEDURE — G0157 HHC PT ASSISTANT EA 15: HCPCS

## 2022-04-29 ENCOUNTER — HOME CARE VISIT (OUTPATIENT)
Dept: HOME HEALTH SERVICES | Facility: CLINIC | Age: 57
End: 2022-04-29

## 2022-04-29 VITALS
HEART RATE: 97 BPM | OXYGEN SATURATION: 97 % | SYSTOLIC BLOOD PRESSURE: 122 MMHG | DIASTOLIC BLOOD PRESSURE: 78 MMHG | RESPIRATION RATE: 18 BRPM | TEMPERATURE: 98.1 F

## 2022-04-29 DIAGNOSIS — E03.9 HYPOTHYROIDISM, UNSPECIFIED TYPE: ICD-10-CM

## 2022-04-29 PROCEDURE — G0157 HHC PT ASSISTANT EA 15: HCPCS

## 2022-04-30 NOTE — HOME HEALTH
pt up amb. from bathroom upon arrival w/ daughter present, and reports still having difficulty lifting L leg, ans usinf R leg to help lift L leg up into bed; pt reports removed pain pump yesterday;

## 2022-05-02 ENCOUNTER — HOME CARE VISIT (OUTPATIENT)
Dept: HOME HEALTH SERVICES | Facility: CLINIC | Age: 57
End: 2022-05-02

## 2022-05-02 VITALS
RESPIRATION RATE: 18 BRPM | SYSTOLIC BLOOD PRESSURE: 136 MMHG | OXYGEN SATURATION: 98 % | TEMPERATURE: 97.9 F | DIASTOLIC BLOOD PRESSURE: 60 MMHG | HEART RATE: 76 BPM

## 2022-05-02 PROCEDURE — G0157 HHC PT ASSISTANT EA 15: HCPCS

## 2022-05-03 DIAGNOSIS — J06.9 VIRAL UPPER RESPIRATORY TRACT INFECTION: Primary | ICD-10-CM

## 2022-05-03 RX ORDER — AZITHROMYCIN 250 MG/1
TABLET, FILM COATED ORAL
Qty: 6 TABLET | Refills: 0 | Status: SHIPPED | OUTPATIENT
Start: 2022-05-03 | End: 2022-05-16

## 2022-05-03 RX ORDER — LEVOTHYROXINE SODIUM 0.03 MG/1
25 TABLET ORAL DAILY
Qty: 30 TABLET | Refills: 1 | Status: SHIPPED | OUTPATIENT
Start: 2022-05-03 | End: 2022-06-28 | Stop reason: SDUPTHER

## 2022-05-04 ENCOUNTER — HOME CARE VISIT (OUTPATIENT)
Dept: HOME HEALTH SERVICES | Facility: CLINIC | Age: 57
End: 2022-05-04

## 2022-05-04 VITALS
OXYGEN SATURATION: 97 % | DIASTOLIC BLOOD PRESSURE: 68 MMHG | HEART RATE: 68 BPM | RESPIRATION RATE: 18 BRPM | SYSTOLIC BLOOD PRESSURE: 122 MMHG

## 2022-05-04 PROCEDURE — G0157 HHC PT ASSISTANT EA 15: HCPCS

## 2022-05-05 ENCOUNTER — HOME CARE VISIT (OUTPATIENT)
Dept: HOME HEALTH SERVICES | Facility: CLINIC | Age: 57
End: 2022-05-05

## 2022-05-05 VITALS
TEMPERATURE: 98.3 F | OXYGEN SATURATION: 98 % | DIASTOLIC BLOOD PRESSURE: 80 MMHG | RESPIRATION RATE: 18 BRPM | HEART RATE: 82 BPM | SYSTOLIC BLOOD PRESSURE: 142 MMHG

## 2022-05-05 PROCEDURE — G0157 HHC PT ASSISTANT EA 15: HCPCS

## 2022-05-09 ENCOUNTER — HOME CARE VISIT (OUTPATIENT)
Dept: HOME HEALTH SERVICES | Facility: CLINIC | Age: 57
End: 2022-05-09

## 2022-05-09 PROCEDURE — G0151 HHCP-SERV OF PT,EA 15 MIN: HCPCS

## 2022-05-12 ENCOUNTER — APPOINTMENT (OUTPATIENT)
Dept: PHYSICAL THERAPY | Facility: HOSPITAL | Age: 57
End: 2022-05-12

## 2022-05-13 VITALS
OXYGEN SATURATION: 98 % | DIASTOLIC BLOOD PRESSURE: 62 MMHG | HEART RATE: 68 BPM | SYSTOLIC BLOOD PRESSURE: 124 MMHG | RESPIRATION RATE: 20 BRPM

## 2022-05-13 NOTE — HOME HEALTH
Subjective: Patient reports doing pretty well this date. States that she is ready for her staples to come out d/t continuing to feel tight in the front of her knee. States that she has been doing all of her exercises regularly and feels that she is making progress overall. Patient reports that she feels she will be ready to transition to outpatient services and comfortable with her ability to walk coming a long way from immediately following surgery. Reports she would [facility] go to [facility] [place] for ongoing therapy d/t it being close to home. Will help patient get set up for outpatient therapy.     PT performed removal of staples per MD orders on this date. Minimal bleeding and no further dressing required over incision. Steri strips applied to help maintain incsiion integrity and educated patient that strips would fall off on their own with regualr activity and to leave in place until this occurs. No issues to report at this time and patient to follow up with Dr. Smith tomorrow for post-op follow up visit.

## 2022-05-13 NOTE — CASE COMMUNICATION
Please forward to = Dr. Wilfredo Smith    [facility] Discharge    Reason for Referral: PATIENT HAD A LEFT TKA ON 4/25/2022 SECONDARY TO SEVERE ARTHRITIS LEFT KNEE. SHE HAS HAD PAIN FOR A LONG TIME LIMITING HER AT THIS TIME. PATIENT HAD A SAME DAY SURGERY AND CAME HOME THE SAME NIGHT AS HER SURGERY. SHE IS DOING OK SINCE BEING HOME AT THIS TIME.     Prior vs Current Level of Function:    Bed mobility: min A at eval ; indep at DC    Transfers: SBA at eval; mod i at DC    Gait: 40 ft w/ CGA and use of RW at eval; 150 ft w/ mod i and use of RW at DC, able to navigate 2 steps to enter and exit home safely w/ SBA at DC    ADLs: min A for ADLs at eval; mod i for ADLs at DC    Condition: good    Reason: goals met; transitioning to outpatient     Discharged to: home w/ care of self and family    Next MD Visit: 5/12/22 with Dr. Wilfredo Smith

## 2022-05-16 ENCOUNTER — OFFICE VISIT (OUTPATIENT)
Dept: FAMILY MEDICINE CLINIC | Facility: CLINIC | Age: 57
End: 2022-05-16

## 2022-05-16 VITALS
DIASTOLIC BLOOD PRESSURE: 68 MMHG | BODY MASS INDEX: 29.53 KG/M2 | SYSTOLIC BLOOD PRESSURE: 102 MMHG | HEIGHT: 64 IN | WEIGHT: 173 LBS

## 2022-05-16 DIAGNOSIS — E78.00 HYPERCHOLESTEREMIA: ICD-10-CM

## 2022-05-16 DIAGNOSIS — K21.9 GASTROESOPHAGEAL REFLUX DISEASE WITHOUT ESOPHAGITIS: ICD-10-CM

## 2022-05-16 DIAGNOSIS — F33.1 MODERATE EPISODE OF RECURRENT MAJOR DEPRESSIVE DISORDER: ICD-10-CM

## 2022-05-16 DIAGNOSIS — F41.1 GENERALIZED ANXIETY DISORDER: Primary | ICD-10-CM

## 2022-05-16 DIAGNOSIS — E89.0 POSTABLATIVE HYPOTHYROIDISM: ICD-10-CM

## 2022-05-16 DIAGNOSIS — M43.16 SPONDYLOLISTHESIS OF LUMBAR REGION: ICD-10-CM

## 2022-05-16 PROCEDURE — 99213 OFFICE O/P EST LOW 20 MIN: CPT | Performed by: NURSE PRACTITIONER

## 2022-05-16 RX ORDER — CELECOXIB 200 MG/1
CAPSULE ORAL EVERY 12 HOURS SCHEDULED
COMMUNITY
End: 2022-08-25

## 2022-05-16 RX ORDER — BENZONATATE 100 MG/1
100 CAPSULE ORAL 3 TIMES DAILY PRN
Qty: 60 CAPSULE | Refills: 0 | Status: SHIPPED | OUTPATIENT
Start: 2022-05-16 | End: 2022-08-25

## 2022-05-16 NOTE — PROGRESS NOTES
"Chief Complaint  Anxiety (6 month check up), Hyperlipidemia, Heartburn, Thyroid Problem, Depression, and Back Pain    Subjective   Six month follow-up for anxiety, GERD, hypothyroidism, anxiety, depression and chronic back pain.  Three weeks ago had a left total knee replacement.  \"I feel so much better.  It is still sore but I do not have all of the grinding and popping.\"        Nerissa Lopez presents to [facility] [place]  Anxiety  Presents for follow-up visit. Symptoms include decreased concentration, depressed mood, excessive worry, irritability, malaise, muscle tension and restlessness. Patient reports no compulsions, confusion, feeling of choking, hyperventilation, obsessions, panic or suicidal ideas. Symptoms occur most days. The severity of symptoms is moderate. The quality of sleep is fair. Nighttime awakenings: one to two.     Her past medical history is significant for depression. Compliance with medications is %.   Thyroid Problem  Presents for follow-up visit. Symptoms include depressed mood. Patient reports no fatigue. The symptoms have been stable. Her past medical history is significant for hyperlipidemia.   Back Pain  This is a chronic problem. The current episode started more than 1 year ago. The problem occurs constantly. The problem is unchanged. The pain is present in the lumbar spine and thoracic spine. The pain radiates to the right thigh. The pain is at a severity of 9/10. The pain is severe. The pain is the same all the time. The symptoms are aggravated by standing, sitting, stress, position and lying down. Risk factors include poor posture, obesity, lack of exercise and menopause. She has tried muscle relaxant, NSAIDs and analgesics for the symptoms. The treatment provided mild relief.   Depression  Visit Type: follow-up  Patient presents with the following symptoms: decreased concentration, depressed mood, excessive worry, feelings of " hopelessness, irritability, malaise, muscle tension and restlessness.  Patient is not experiencing: compulsions, confusion, hyperventilation, obsessions, panic, suicidal ideas, suicidal planning and thoughts of death.  Frequency of symptoms: constantly   Severity: severe   Sleep quality: fair  Nighttime awakenings: several    Heartburn  She complains of belching, globus sensation, heartburn and water brash. This is a chronic problem. The current episode started more than 1 year ago. The problem occurs occasionally. The problem has been unchanged. The heartburn does not wake her from sleep. The heartburn does not limit her activity. The heartburn doesn't change with position. The symptoms are aggravated by certain foods and stress. Pertinent negatives include no fatigue. Risk factors include caffeine use. She has tried a PPI for the symptoms. The treatment provided significant relief.   Hyperlipidemia  This is a chronic problem. The current episode started more than 1 year ago. The problem is controlled. Factors aggravating her hyperlipidemia include fatty foods. Current antihyperlipidemic treatment includes diet change. The current treatment provides moderate improvement of lipids. There are no compliance problems.  Risk factors for coronary artery disease include dyslipidemia and stress.     Current Outpatient Medications on File Prior to Visit   Medication Sig Dispense Refill   • Ascorbic Acid (Vitamin C) 500 MG capsule Take 1 each by mouth Daily.     • aspirin 325 MG tablet Take 325 mg by mouth 2 (two) times a day.     • Calcium Carbonate 1500 (600 Ca) MG tablet Take 1 tablet by mouth 2 (Two) Times a Day With Meals. 180 tablet 2   • celecoxib (CeleBREX) 200 MG capsule Every 12 (Twelve) Hours.     • cholecalciferol (VITAMIN D3) 25 MCG (1000 UT) tablet Take 1,000 Units by mouth Daily.     • coenzyme Q10 100 MG capsule Take 200 mg by mouth Daily.     • cyanocobalamin (VITAMIN B-12) 500 MCG tablet Take 500 mcg by  mouth Daily.     • Diclofenac Sodium (VOLTAREN) 1 % gel gel Apply to painful joints BID x 2 wks then PRN     • docusate sodium (COLACE) 100 MG capsule Take 200 mg by mouth 2 (Two) Times a Day.     • escitalopram (LEXAPRO) 10 MG tablet Take 1 tablet by mouth Daily. 90 tablet 1   • fluticasone (FLONASE) 50 MCG/ACT nasal spray 2 sprays into the nostril(s) as directed by provider Daily. Administer 2 sprays in each nostril for each dose. 16 g 2   • HYDROcodone-acetaminophen (NORCO) 5-325 MG per tablet Take 1 tablet by mouth.     • levothyroxine (SYNTHROID, LEVOTHROID) 25 MCG tablet TAKE 1 TABLET BY MOUTH DAILY 30 tablet 1   • lidocaine (XYLOCAINE) 5 % ointment Apply  topically to the appropriate area as directed Every 2 (Two) Hours As Needed for Mild Pain . 50 g 1   • LORazepam (ATIVAN) 0.5 MG tablet Take 1 tablet by mouth Daily As Needed for Anxiety. 30 tablet 0   • magnesium oxide (MAGOX) 400 (241.3 Mg) MG tablet tablet Take 800 mg by mouth Daily.     • meloxicam (MOBIC) 15 MG tablet Take 1 tablet by mouth Daily. 90 tablet 3   • Multiple Vitamins-Minerals (CENTRUM VITAMINTS PO) Take 1 tablet by mouth Daily.     • multivitamin with minerals tablet tablet Take 1 tablet by mouth Daily.     • naloxone (NARCAN) 4 MG/0.1ML nasal spray 1 spray into the nostril(s) as directed by provider As Needed (opiod overdose). 1 each 1   • Omega-3 Fatty Acids (FISH OIL CONCENTRATE) 1000 MG capsule Take 1 capsule by mouth 2 (two) times a day.     • omeprazole (priLOSEC) 20 MG capsule Take 1 capsule by mouth Daily. 90 capsule 3   • ondansetron (ZOFRAN) 4 MG tablet Take 8 mg by mouth 2 (Two) Times a Day As Needed for Nausea.     • polyethylene glycol (MIRALAX) 17 GM/SCOOP powder Take 17 g by mouth Daily. 765 g 5   • tiZANidine (ZANAFLEX) 4 MG tablet Take 1 tablet by mouth Every 6 (Six) Hours As Needed for Muscle Spasms. 360 tablet 2   • Turmeric 500 MG capsule Take 1 tablet by mouth Daily.     • vitamin E 400 UNIT capsule Take 400 Units by  "mouth daily.     • Multiple Vitamins-Minerals (MULTIVITAMIN PO) Take 1 tablet by mouth daily.     • oxyCODONE-acetaminophen (PERCOCET) 7.5-325 MG per tablet Take 1 tablet by mouth Every 6 (Six) Hours As Needed for Moderate Pain .     • traMADol (ULTRAM) 50 MG tablet Take 50 mg by mouth Every 6 (Six) Hours As Needed for Moderate Pain .     • [DISCONTINUED] aspirin 81 MG EC tablet Take 81 mg by mouth Daily.     • [DISCONTINUED] azithromycin (Zithromax Z-Alejandro) 250 MG tablet Take 2 tablets by mouth on day 1, then 1 tablet daily on days 2-5 6 tablet 0   • [DISCONTINUED] gabapentin (NEURONTIN) 300 MG capsule Take 300 mg by mouth Daily.       No current facility-administered medications on file prior to visit.     Allergies   Allergen Reactions   • Sulfa Antibiotics Arrhythmia   • Kenalog [Triamcinolone Acetonide] Palpitations   • Medrol [Methylprednisolone] Palpitations   • Prednisone Palpitations       Objective   Vital Signs:  /68   Ht 162.6 cm (64\")   Wt 78.5 kg (173 lb)   BMI 29.70 kg/m²           Physical Exam  Vitals and nursing note reviewed.   Constitutional:       Appearance: Normal appearance. She is well-developed.      Comments: Ambulation assisted via rolling walker   Cardiovascular:      Rate and Rhythm: Normal rate and regular rhythm.      Heart sounds: Normal heart sounds.   Pulmonary:      Effort: Pulmonary effort is normal.      Breath sounds: Normal breath sounds.   Musculoskeletal:         General: Tenderness present.      Cervical back: Normal range of motion and neck supple.      Left knee: Swelling present. Decreased range of motion. Tenderness present.        Legs:    Skin:     General: Skin is warm.      Capillary Refill: Capillary refill takes less than 2 seconds.   Neurological:      Mental Status: She is alert and oriented to person, place, and time.   Psychiatric:         Behavior: Behavior normal.        Result Review :     Common labs    Common Labsle 8/26/21 8/26/21 8/26/21 9/16/21 "    0814 0814 0814    Glucose   77 66   BUN   12 10   Creatinine   0.70 0.70   eGFR Non African Am   87 87   Sodium   138 138   Potassium   5.7 (A) 4.0   Chloride   103 100   Calcium   9.3 9.1   Albumin   4.30    Total Bilirubin   0.2    Alkaline Phosphatase   73    AST (SGOT)   25    ALT (SGPT)   17    WBC 5.09      Hemoglobin 13.1      Hematocrit 40.6      Platelets 303      Total Cholesterol  187     Triglycerides  60     HDL Cholesterol  77 (A)     LDL Cholesterol   99     (A) Abnormal value                      Assessment and Plan    Diagnoses and all orders for this visit:    1. Generalized anxiety disorder (Primary)    2. Gastroesophageal reflux disease without esophagitis    3. Moderate episode of recurrent major depressive disorder (HCC)    4. Hypercholesteremia    5. Postablative hypothyroidism    6. Spondylolisthesis of lumbar region    Other orders  -     benzonatate (Tessalon Perles) 100 MG capsule; Take 1 capsule by mouth 3 (Three) Times a Day As Needed for Cough.  Dispense: 60 capsule; Refill: 0        1.  Generalized anxiety disorder:  Controlled  Continue Ativan as needed    2.  Gastroesophageal reflux disease without esophagitis:  Controlled  Continue on omeprazole as previously prescribed     3.  Moderate episode of recurrent major depressive disorder:  Controlled  Continue on Lexapro as previously prescribed  Seek emergency medical treatment for any new or worsening thoughts of hopelessness, helplessness or self-harm     4.  Hypercholesterolemia:  Continue low-fat diet     5.    Hypothyroidism, unspecified type:  Continue Synthroid     6.  Spondylolisthesis of lumbar spine:  Discontinue meloxicam  Continue on Norco as previously per , orthopedic surgeon        I spent 22 minutes caring for Nerissa on this date of service. This time includes time spent by me in the following activities:preparing for the visit, reviewing tests, obtaining and/or reviewing a separately obtained history,  performing a medically appropriate examination and/or evaluation , counseling and educating the patient/family/caregiver and documenting information in the medical record  Follow Up   Return in about 3 months (around 8/25/2022) for Annual physical.  Patient was given instructions and counseling regarding her condition or for health maintenance advice. Please see specific information pulled into the AVS if appropriate.         This document has been electronically signed by YOANA Arguello on May 16, 2022 08:32 CDT

## 2022-06-28 DIAGNOSIS — E03.9 HYPOTHYROIDISM, UNSPECIFIED TYPE: ICD-10-CM

## 2022-06-28 RX ORDER — LEVOTHYROXINE SODIUM 0.03 MG/1
25 TABLET ORAL DAILY
Qty: 30 TABLET | Refills: 1 | OUTPATIENT
Start: 2022-06-28

## 2022-06-28 RX ORDER — LEVOTHYROXINE SODIUM 0.03 MG/1
25 TABLET ORAL DAILY
Qty: 90 TABLET | Refills: 1 | Status: SHIPPED | OUTPATIENT
Start: 2022-06-28 | End: 2022-12-29 | Stop reason: SDUPTHER

## 2022-07-08 DIAGNOSIS — F41.1 GENERALIZED ANXIETY DISORDER: ICD-10-CM

## 2022-07-08 DIAGNOSIS — R40.0 DAYTIME SLEEPINESS: Primary | ICD-10-CM

## 2022-07-08 RX ORDER — ESCITALOPRAM OXALATE 10 MG/1
10 TABLET ORAL DAILY
Qty: 90 TABLET | Refills: 1 | Status: SHIPPED | OUTPATIENT
Start: 2022-07-08 | End: 2023-01-09 | Stop reason: SDUPTHER

## 2022-08-02 ENCOUNTER — OFFICE VISIT (OUTPATIENT)
Dept: SLEEP MEDICINE | Facility: HOSPITAL | Age: 57
End: 2022-08-02

## 2022-08-02 VITALS
HEART RATE: 69 BPM | OXYGEN SATURATION: 96 % | WEIGHT: 173.2 LBS | BODY MASS INDEX: 29.57 KG/M2 | HEIGHT: 64 IN | DIASTOLIC BLOOD PRESSURE: 77 MMHG | SYSTOLIC BLOOD PRESSURE: 116 MMHG

## 2022-08-02 DIAGNOSIS — R06.83 SNORING: Primary | ICD-10-CM

## 2022-08-02 DIAGNOSIS — G47.19 EXCESSIVE DAYTIME SLEEPINESS: ICD-10-CM

## 2022-08-02 DIAGNOSIS — G25.81 RESTLESS LEGS SYNDROME (RLS): ICD-10-CM

## 2022-08-02 DIAGNOSIS — G47.00 FREQUENT NOCTURNAL AWAKENING: ICD-10-CM

## 2022-08-02 PROCEDURE — 99213 OFFICE O/P EST LOW 20 MIN: CPT | Performed by: NURSE PRACTITIONER

## 2022-08-02 RX ORDER — DIGOXIN 125 MCG
125 TABLET ORAL DAILY
COMMUNITY
Start: 2022-06-30

## 2022-08-02 NOTE — PROGRESS NOTES
New Patient Sleep Medicine Consultation    Encounter Date: 8/2/2022         Patient's PCP: Marti Richardson APRN  Referring provider: Marti Richardson APRN  Reason for consultation chief complaint: snoring, awakening gasping for breath, unrefreshing sleep and insomnia, daytime sleepiness     Nerissa Lopez is a 56 y.o. female whose bedtime is ~ 2030.Lays in bed and watches tv, reads or plays on Ipad until about 2300. She  falls asleep after 15-30 minutes, and is up 4 times per night.Up to the bathroom, tossing and turning. Sometimes able to fall back asleep quickly.  She wakes up ~ 0730. Same on weekend. She endorses 5 hours of sleep. She drinks 2 cups of coffee, 0 teas, and 3 sodas per day. She drinks 0 alcoholic beverages per week.      Nerissa Lopez admits to snoring, unrestful sleep, gasping during sleep, excessive daytime sleepiness, frequent unexplained arousals, sleeping less than 6 hours per night, Disturbed or restless sleep, memory loss, Up to the bathroom at night, restless legs at night and difficulty staying asleep. States urge to move legs 2-3 nights per week. If bad enough she gets up and moves to recliner. She denies cataplexy, sleep paralysis, or hypnagogic hallucinations. She takes no medicine for sleep. She has no sleepiness with driving. She naps rarely. She has never had a sleep study. She sleeps in a bed alone.     Takes Lexapro at night.   Takes hydrocodone four times daily for arthritis. Takes Zanaflex as needed.   Very rarely takes Ativan.     She sees cardiologist Dr. Camacho. Recently put on digoxin for tachycardia.     She is a never smoker.       Marital status:    Occupation: does not work   Children: 3  FH of sleep disorders: son and sister have sleep apnea       Past Medical History:   Diagnosis Date   • Anal fistula    • Anemia    • Astigmatism    • Breast cyst    • Broken heart syndrome 03/02/2016    when mother passed away   • Chronic pain disorder    • Colitis    • Death of  family member     Mother passes away.   • Depression    • Generalized anxiety disorder    • GERD (gastroesophageal reflux disease)    • Goiter    • Graves disease    • Heartburn    • Hematuria    • Hyperthyroidism    • Lesion of eyelid     LLL   • Low back pain    • Lumbosacral spondylosis    • Menopausal syndrome    • Myopia    • Osteoporosis    • Pain in back     Lumbar region   • Pain in joint involving left lower leg    • Pain in wrist    • Panic attack    • Presbyopia    • Right upper quadrant pain    • Takotsubo cardiomyopathy    • Thoracic spondylosis      Social History     Socioeconomic History   • Marital status:    Tobacco Use   • Smoking status: Never Smoker   • Smokeless tobacco: Never Used   Substance and Sexual Activity   • Alcohol use: No   • Drug use: No   • Sexual activity: Defer     Family History   Problem Relation Age of Onset   • Heart disease Mother    • Hyperlipidemia Mother    • Hypertension Mother    • Osteoporosis Mother    • Cancer Mother    • Cancer Father    • Diabetes Sister    • Thyroid disease Sister    • COPD Sister    • Hypertension Sister    • Migraines Sister    • Diabetes Brother    • COPD Brother    • Cancer Maternal Grandmother         Ovarian Cancer   • Breast cancer Other    • Cancer Other    • Other Other         Gall Bladder disease   • Breast cancer Other    • Breast cancer Maternal Aunt          Review of Systems:  Constitutional: negative  Eyes: negative  Ears, nose, mouth, throat, and face: negative  Respiratory: negative  Cardiovascular: negative  Gastrointestinal: negative  Genitourinary:negative  Integument/breast: negative  Hematologic/lymphatic: negative  Musculoskeletal:negative  Neurological: negative  Behavioral/Psych: negative  Endocrine: negative  Allergic/Immunologic: negative Patient advised to discuss any positive ROS with PCP.      Logan - 8      Vitals:    08/02/22 0944   BP: 116/77   Pulse: 69   SpO2: 96%           08/02/22 0944   Weight: 78.6  "kg (173 lb 3.2 oz)       Body mass index is 29.71 kg/m². BMI is >= 25 and <30. (Overweight) The following options were offered after discussion;: nutrition counseling/recommendations      Neck circumference: 13\"          General: Alert. Cooperative. Well developed. No acute distress.             Head:  Normocephalic. Symmetrical. Atraumatic.              Eyes: Sclera clear. No icterus. Normal EOM.             Ears: No deformities. Normal hearing.             Nose: No septal deviation. No drainage.          Throat: No oral lesions. No thrush. Moist mucous membranes. Trachea midline    Tongue is normal    Dentition is fair       Pharynx: Posterior pharyngeal pillars are narrow    Mallampati score of III (soft and hard palate and base of uvula visible)    Pharynx is nonerythematous   Chest Wall:  Normal shape. Symmetric expansion with respiration. No tenderness.          Lungs:  Clear to auscultation bilaterally. No wheezes. No rhonchi. No rales. Respirations regular, even and unlabored.            Heart:  Regular rhythm and normal rate. Normal S1 and S2. No murmur.  Extremities:  Moves all extremities well. No edema.               Skin: Dry. Intact. No bleeding. No rash.           Neuro: Moves all 4 extremities and cranial nerves grossly intact.  Psychiatric: Normal mood and affect.      Current Outpatient Medications:   •  Ascorbic Acid (Vitamin C) 500 MG capsule, Take 1 each by mouth Daily., Disp: , Rfl:   •  aspirin 325 MG tablet, Take 325 mg by mouth 2 (two) times a day., Disp: , Rfl:   •  Calcium Carbonate 1500 (600 Ca) MG tablet, Take 1 tablet by mouth 2 (Two) Times a Day With Meals., Disp: 180 tablet, Rfl: 2  •  cholecalciferol (VITAMIN D3) 25 MCG (1000 UT) tablet, Take 1,000 Units by mouth Daily., Disp: , Rfl:   •  coenzyme Q10 100 MG capsule, Take 200 mg by mouth Daily., Disp: , Rfl:   •  cyanocobalamin (VITAMIN B-12) 500 MCG tablet, Take 500 mcg by mouth Daily., Disp: , Rfl:   •  Diclofenac Sodium (VOLTAREN) " 1 % gel gel, Apply to painful joints BID x 2 wks then PRN, Disp: , Rfl:   •  docusate sodium (COLACE) 100 MG capsule, Take 200 mg by mouth 2 (Two) Times a Day., Disp: , Rfl:   •  escitalopram (LEXAPRO) 10 MG tablet, Take 1 tablet by mouth Daily., Disp: 90 tablet, Rfl: 1  •  fluticasone (FLONASE) 50 MCG/ACT nasal spray, 2 sprays into the nostril(s) as directed by provider Daily. Administer 2 sprays in each nostril for each dose., Disp: 16 g, Rfl: 2  •  HYDROcodone-acetaminophen (NORCO) 5-325 MG per tablet, Take 1 tablet by mouth., Disp: , Rfl:   •  levothyroxine (SYNTHROID, LEVOTHROID) 25 MCG tablet, Take 1 tablet by mouth Daily., Disp: 90 tablet, Rfl: 1  •  lidocaine (XYLOCAINE) 5 % ointment, Apply  topically to the appropriate area as directed Every 2 (Two) Hours As Needed for Mild Pain ., Disp: 50 g, Rfl: 1  •  LORazepam (ATIVAN) 0.5 MG tablet, Take 1 tablet by mouth Daily As Needed for Anxiety., Disp: 30 tablet, Rfl: 0  •  magnesium oxide (MAGOX) 400 (241.3 Mg) MG tablet tablet, Take 800 mg by mouth Daily., Disp: , Rfl:   •  Multiple Vitamins-Minerals (CENTRUM VITAMINTS PO), Take 1 tablet by mouth Daily., Disp: , Rfl:   •  naloxone (NARCAN) 4 MG/0.1ML nasal spray, 1 spray into the nostril(s) as directed by provider As Needed (opiod overdose)., Disp: 1 each, Rfl: 1  •  Omega-3 Fatty Acids (FISH OIL CONCENTRATE) 1000 MG capsule, Take 1 capsule by mouth 2 (two) times a day., Disp: , Rfl:   •  omeprazole (priLOSEC) 20 MG capsule, Take 1 capsule by mouth Daily., Disp: 90 capsule, Rfl: 3  •  ondansetron (ZOFRAN) 4 MG tablet, Take 8 mg by mouth 2 (Two) Times a Day As Needed for Nausea., Disp: , Rfl:   •  polyethylene glycol (MIRALAX) 17 GM/SCOOP powder, Take 17 g by mouth Daily., Disp: 765 g, Rfl: 5  •  tiZANidine (ZANAFLEX) 4 MG tablet, Take 1 tablet by mouth Every 6 (Six) Hours As Needed for Muscle Spasms., Disp: 360 tablet, Rfl: 2  •  vitamin E 400 UNIT capsule, Take 400 Units by mouth daily., Disp: , Rfl:   •   benzonatate (Tessalon Perles) 100 MG capsule, Take 1 capsule by mouth 3 (Three) Times a Day As Needed for Cough., Disp: 60 capsule, Rfl: 0  •  celecoxib (CeleBREX) 200 MG capsule, Every 12 (Twelve) Hours., Disp: , Rfl:   •  digoxin (LANOXIN) 125 MCG tablet, Take 125 mcg by mouth Daily., Disp: , Rfl:   •  oxyCODONE-acetaminophen (PERCOCET) 7.5-325 MG per tablet, Take 1 tablet by mouth Every 6 (Six) Hours As Needed for Moderate Pain ., Disp: , Rfl:   •  Turmeric 500 MG capsule, Take 1 tablet by mouth Daily., Disp: , Rfl:     Lab Results   Component Value Date    WBC 5.09 08/26/2021    HGB 13.1 08/26/2021    HCT 40.6 08/26/2021    MCV 95.3 08/26/2021     08/26/2021     Lab Results   Component Value Date    GLUCOSE 66 09/16/2021    CALCIUM 9.1 09/16/2021     09/16/2021    K 4.0 09/16/2021    CO2 27.3 09/16/2021     09/16/2021    BUN 10 09/16/2021    CREATININE 0.70 09/16/2021    EGFRIFNONA 87 09/16/2021    BCR 14.3 09/16/2021    ANIONGAP 10.7 09/16/2021     Lab Results   Component Value Date    INR 1.1 03/02/2016    PROTIME 14.1 03/02/2016     Lab Results   Component Value Date    CKTOTAL 27 (L) 03/08/2016    CKMB <0.2 03/08/2016    TROPONINI <0.012 02/22/2017       No results found for: PHART, SRQ9GPM, PO2ART]    Contraindications to home sleep test: Sleep related movement disorder like periodic limb movement disorder and Ongoing narcotic therapy especially with infusion pumps    Assessment and Plan:    1. Excessive daytime sleepiness-   1. Check PSG  2. Good sleep hygiene   3. Drowsy driving tips- do not drive if feeling sleepy   4. RTC in 2 weeks with study results   2.   Snoring- New to me, additional work-up planned    1.   As above   3.   Restless leg syndrome/PLMD   1. PSG   2. Continue to monitor   4.   Insomnia - sleep onset and or maintenance    1.   Good sleep hygiene       I obtained a brief history from the patient, reviewed the medical problems and current medications, and made medical  decisions regarding treatment based on that information.   I spent 26 minutes caring for Nerissa on this date of service. This time includes time spent by me in the following activities: preparing for the visit, obtaining and/or reviewing a separately obtained history, performing a medically appropriate examination and/or evaluation, counseling and educating the patient/family/caregiver, ordering medications, tests, or procedures and documenting information in the medical record.  I answered all of her questions and she verbalized understanding. Discussed involved good sleep hygiene, sleep apnea, health impact of sleep apnea, in lab sleep testing, and follow-up.           RTC 2 weeks after study for results.             This document has been electronically signed by YOANA Rodriguez on August 2, 2022 09:49 CDT          This document has been electronically signed by YOANA Rodriguez on August 2, 2022         CC: Marti Richardson APRN Smith, Gina M, APRN

## 2022-08-08 ENCOUNTER — HOSPITAL ENCOUNTER (OUTPATIENT)
Dept: SLEEP MEDICINE | Facility: HOSPITAL | Age: 57
Discharge: HOME | End: 2022-08-08
Admitting: NURSE PRACTITIONER

## 2022-08-08 DIAGNOSIS — G47.19 EXCESSIVE DAYTIME SLEEPINESS: ICD-10-CM

## 2022-08-08 DIAGNOSIS — G25.81 RESTLESS LEGS SYNDROME (RLS): ICD-10-CM

## 2022-08-08 DIAGNOSIS — R06.83 SNORING: ICD-10-CM

## 2022-08-08 DIAGNOSIS — G47.00 FREQUENT NOCTURNAL AWAKENING: ICD-10-CM

## 2022-08-08 PROCEDURE — 95810 POLYSOM 6/> YRS 4/> PARAM: CPT

## 2022-08-08 PROCEDURE — 95810 POLYSOM 6/> YRS 4/> PARAM: CPT | Performed by: PSYCHIATRY & NEUROLOGY

## 2022-08-25 ENCOUNTER — OFFICE VISIT (OUTPATIENT)
Dept: FAMILY MEDICINE CLINIC | Facility: CLINIC | Age: 57
End: 2022-08-25

## 2022-08-25 VITALS
OXYGEN SATURATION: 98 % | HEART RATE: 68 BPM | WEIGHT: 174.8 LBS | SYSTOLIC BLOOD PRESSURE: 102 MMHG | HEIGHT: 64 IN | BODY MASS INDEX: 29.84 KG/M2 | DIASTOLIC BLOOD PRESSURE: 60 MMHG

## 2022-08-25 DIAGNOSIS — K21.9 GASTROESOPHAGEAL REFLUX DISEASE WITHOUT ESOPHAGITIS: ICD-10-CM

## 2022-08-25 DIAGNOSIS — E78.00 HYPERCHOLESTEREMIA: ICD-10-CM

## 2022-08-25 DIAGNOSIS — Z12.31 ENCOUNTER FOR SCREENING MAMMOGRAM FOR MALIGNANT NEOPLASM OF BREAST: ICD-10-CM

## 2022-08-25 DIAGNOSIS — E89.0 POSTABLATIVE HYPOTHYROIDISM: ICD-10-CM

## 2022-08-25 DIAGNOSIS — F41.1 GENERALIZED ANXIETY DISORDER: ICD-10-CM

## 2022-08-25 DIAGNOSIS — R30.0 DYSURIA: ICD-10-CM

## 2022-08-25 DIAGNOSIS — Z00.00 ANNUAL PHYSICAL EXAM: Primary | ICD-10-CM

## 2022-08-25 DIAGNOSIS — F33.1 MODERATE EPISODE OF RECURRENT MAJOR DEPRESSIVE DISORDER: ICD-10-CM

## 2022-08-25 PROBLEM — I95.9 LOW BLOOD PRESSURE: Status: ACTIVE | Noted: 2021-06-22

## 2022-08-25 PROBLEM — E03.9 HYPOTHYROIDISM: Status: ACTIVE | Noted: 2021-06-22

## 2022-08-25 PROCEDURE — 99396 PREV VISIT EST AGE 40-64: CPT | Performed by: NURSE PRACTITIONER

## 2022-08-25 NOTE — PROGRESS NOTES
"Chief Complaint  Annual Exam    Subjective   Annual physical exam.  Has high cholesterol, hypothyroidism, chronic back pain and anxiety and depression.  Anxiety and depression have greatly improved with Lexapro.  \"It has done wonders for me.\"  Has been complaining of intermittent episodes of burning and discomfort with urination.      Nerissa Lopez presents to [facility] [place] is not  HPI:  Annual physical exam     Anxiety  Presents for follow-up visit. Onset was 1 to 5 years ago. The problem has been gradually improving. Symptoms include decreased concentration, depressed mood, excessive worry, malaise, muscle tension and restlessness. Patient reports no compulsions, confusion, feeling of choking, hyperventilation, obsessions, panic or suicidal ideas. Symptoms occur occasionally. The severity of symptoms is moderate. The quality of sleep is fair. Nighttime awakenings: one to two.     Her past medical history is significant for depression. Compliance with medications is %.   Thyroid Problem  Presents for follow-up visit. Symptoms include depressed mood. Patient reports no fatigue. The symptoms have been stable. Her past medical history is significant for hyperlipidemia.   Back Pain  This is a chronic problem. The current episode started more than 1 year ago. The problem occurs constantly. The problem is unchanged. The pain is present in the lumbar spine and thoracic spine. The pain radiates to the right thigh. The pain is at a severity of 9/10. The pain is severe. The pain is the same all the time. The symptoms are aggravated by standing, sitting, stress, position and lying down. Risk factors include poor posture, obesity, lack of exercise and menopause. She has tried muscle relaxant, NSAIDs and analgesics for the symptoms. The treatment provided mild relief.   Depression  Visit Type: follow-up  Patient presents with the following symptoms: decreased " concentration, depressed mood, excessive worry, feelings of hopelessness, malaise, muscle tension and restlessness.  Patient is not experiencing: compulsions, confusion, hyperventilation, obsessions, panic, suicidal ideas, suicidal planning and thoughts of death.  Frequency of symptoms: constantly   Severity: severe   Sleep quality: fair  Nighttime awakenings: several    Heartburn  She complains of belching, globus sensation, heartburn and water brash. This is a chronic problem. The current episode started more than 1 year ago. The problem occurs occasionally. The problem has been unchanged. The heartburn does not wake her from sleep. The heartburn does not limit her activity. The heartburn doesn't change with position. The symptoms are aggravated by certain foods and stress. Pertinent negatives include no fatigue. Risk factors include caffeine use. She has tried a PPI for the symptoms. The treatment provided significant relief.   Hyperlipidemia  This is a chronic problem. The current episode started more than 1 year ago. The problem is controlled. Factors aggravating her hyperlipidemia include fatty foods. Current antihyperlipidemic treatment includes diet change. The current treatment provides moderate improvement of lipids. There are no compliance problems.  Risk factors for coronary artery disease include dyslipidemia and stress.   Urinary Tract Infection   This is a recurrent problem. The current episode started more than 1 month ago. The problem occurs intermittently. The problem has been waxing and waning. The quality of the pain is described as burning. The pain is mild. There has been no fever. Associated symptoms include frequency and urgency. She has tried increased fluids for the symptoms. The treatment provided mild relief.     Current Outpatient Medications on File Prior to Visit   Medication Sig Dispense Refill   • Ascorbic Acid (Vitamin C) 500 MG capsule Take 1 each by mouth Daily.     • aspirin 325  MG tablet Take 325 mg by mouth 2 (two) times a day.     • Calcium Carbonate 1500 (600 Ca) MG tablet Take 1 tablet by mouth 2 (Two) Times a Day With Meals. 180 tablet 2   • cholecalciferol (VITAMIN D3) 25 MCG (1000 UT) tablet Take 1,000 Units by mouth Daily.     • coenzyme Q10 100 MG capsule Take 200 mg by mouth Daily.     • cyanocobalamin (VITAMIN B-12) 500 MCG tablet Take 500 mcg by mouth Daily.     • Diclofenac Sodium (VOLTAREN) 1 % gel gel Apply to painful joints BID x 2 wks then PRN     • digoxin (LANOXIN) 125 MCG tablet Take 125 mcg by mouth Daily.     • docusate sodium (COLACE) 100 MG capsule Take 200 mg by mouth 2 (Two) Times a Day.     • escitalopram (LEXAPRO) 10 MG tablet Take 1 tablet by mouth Daily. 90 tablet 1   • fluticasone (FLONASE) 50 MCG/ACT nasal spray 2 sprays into the nostril(s) as directed by provider Daily. Administer 2 sprays in each nostril for each dose. 16 g 2   • HYDROcodone-acetaminophen (NORCO) 5-325 MG per tablet Take 1 tablet by mouth.     • levothyroxine (SYNTHROID, LEVOTHROID) 25 MCG tablet Take 1 tablet by mouth Daily. 90 tablet 1   • lidocaine (XYLOCAINE) 5 % ointment Apply  topically to the appropriate area as directed Every 2 (Two) Hours As Needed for Mild Pain . 50 g 1   • LORazepam (ATIVAN) 0.5 MG tablet Take 1 tablet by mouth Daily As Needed for Anxiety. 30 tablet 0   • magnesium oxide (MAGOX) 400 (241.3 Mg) MG tablet tablet Take 800 mg by mouth Daily.     • Multiple Vitamins-Minerals (CENTRUM VITAMINTS PO) Take 1 tablet by mouth Daily.     • naloxone (NARCAN) 4 MG/0.1ML nasal spray 1 spray into the nostril(s) as directed by provider As Needed (opiod overdose). 1 each 1   • Omega-3 Fatty Acids (FISH OIL CONCENTRATE) 1000 MG capsule Take 1 capsule by mouth 2 (two) times a day.     • omeprazole (priLOSEC) 20 MG capsule Take 1 capsule by mouth Daily. 90 capsule 3   • ondansetron (ZOFRAN) 4 MG tablet Take 8 mg by mouth 2 (Two) Times a Day As Needed for Nausea.     • polyethylene  "glycol (MIRALAX) 17 GM/SCOOP powder Take 17 g by mouth Daily. 765 g 5   • tiZANidine (ZANAFLEX) 4 MG tablet Take 1 tablet by mouth Every 6 (Six) Hours As Needed for Muscle Spasms. 360 tablet 2   • vitamin E 400 UNIT capsule Take 400 Units by mouth daily.     • [DISCONTINUED] benzonatate (Tessalon Perles) 100 MG capsule Take 1 capsule by mouth 3 (Three) Times a Day As Needed for Cough. 60 capsule 0   • [DISCONTINUED] celecoxib (CeleBREX) 200 MG capsule Every 12 (Twelve) Hours.     • [DISCONTINUED] Turmeric 500 MG capsule Take 1 tablet by mouth Daily.       No current facility-administered medications on file prior to visit.     Allergies   Allergen Reactions   • Sulfa Antibiotics Arrhythmia   • Kenalog [Triamcinolone Acetonide] Palpitations   • Medrol [Methylprednisolone] Palpitations   • Prednisone Palpitations       Objective   Vital Signs:  /60   Pulse 68   Ht 162.6 cm (64\")   Wt 79.3 kg (174 lb 12.8 oz)   SpO2 98%   BMI 30.00 kg/m²   Estimated body mass index is 30 kg/m² as calculated from the following:    Height as of this encounter: 162.6 cm (64\").    Weight as of this encounter: 79.3 kg (174 lb 12.8 oz).      Physical Exam  Vitals and nursing note reviewed.   Constitutional:       Appearance: Normal appearance. She is well-developed. She is obese.   HENT:      Head: Normocephalic and atraumatic.      Right Ear: Tympanic membrane, ear canal and external ear normal.      Left Ear: Tympanic membrane, ear canal and external ear normal.      Nose: Nose normal.      Mouth/Throat:      Mouth: Mucous membranes are moist.      Pharynx: Oropharynx is clear.   Eyes:      Extraocular Movements: Extraocular movements intact.      Conjunctiva/sclera: Conjunctivae normal.      Pupils: Pupils are equal, round, and reactive to light.   Cardiovascular:      Rate and Rhythm: Normal rate and regular rhythm.      Pulses: Normal pulses.      Heart sounds: Normal heart sounds.   Pulmonary:      Effort: Pulmonary effort " is normal.      Breath sounds: Normal breath sounds.   Chest:      Comments: Bilateral, manual breast exam performed and no dimpling, puckering, masses or nodules palpated    Abdominal:      General: Bowel sounds are normal.      Palpations: Abdomen is soft.   Musculoskeletal:         General: Normal range of motion.      Cervical back: Normal range of motion and neck supple.        Legs:       Comments: Well-healed surgical incision from left total knee replacement   Skin:     General: Skin is warm.      Capillary Refill: Capillary refill takes less than 2 seconds.   Neurological:      Mental Status: She is alert and oriented to person, place, and time.   Psychiatric:         Behavior: Behavior normal.        Result Review :  The following data was reviewed by: YOANA Arguello on 08/25/2022:  Common labs    Common Labsle 9/16/21   Glucose 66   BUN 10   Creatinine 0.70   eGFR Non African Am 87   Sodium 138   Potassium 4.0   Chloride 100   Calcium 9.1                     Assessment and Plan   Diagnoses and all orders for this visit:    1. Annual physical exam (Primary)    2. Hypercholesteremia  -     Lipid panel; Future    3. Postablative hypothyroidism  -     TSH; Future  -     T4, free; Future    4. Gastroesophageal reflux disease without esophagitis  -     CBC & Differential; Future  -     Comprehensive Metabolic Panel; Future    5. Generalized anxiety disorder    6. Moderate episode of recurrent major depressive disorder (HCC)    7. Dysuria  -     Urinalysis With Culture If Indicated -; Future    8. Encounter for screening mammogram for malignant neoplasm of breast  -     Mammo Screening Digital Tomosynthesis Bilateral With CAD; Future      1.  Annual physical exam:  Continue on current medications as previously prescribed   Counseling on importance of heathy eating habits and regular physical activity regimen on improving overall physical and mental health.     2.  Hypercholesterolemia:  Continue omega-3  fatty acid  Complete fasting lipid panel as ordered and will notify of results when available  Continue to adhere to a low-fat diet    3.  Post ablative hypothyroidism:  Complete TSH and free T4 as ordered and will notify of results when available  Continue levothyroxine as previously prescribed    4.  Gastroesophageal reflux disease without esophagitis:  Complete CBC chemistry panel as ordered and will notify of results when available  Continue omeprazole as previously prescribed  Avoid eating approximately 2 hours prior to bedtime to reduce nighttime flareups    5.  Generalized anxiety disorder:  Continue Lexapro as previously prescribed  Continue lorazepam on a as needed basis  Continue stress reducing techniques such as counting to 10 when becoming anxious    6.  Moderate episode of recurrent major depressive disorder:  Continue Lexapro as previously prescribed  Seek emergency medical treatment for any new or worsening thoughts of hopelessness, helplessness or self-harm    7.  Dysuria:  Complete urinalysis as ordered and will notify of results when available  Increase fluids, especially water, to help promote hydration and elimination    8.  Encounter for screening mammogram for malignant neoplasm of breast:  Radiology will call to schedule bilateral mammogram  Encouraged bi-monthly self breast exams at home       Follow Up   Return in about 6 months (around 2/25/2023) for Recheck.  Patient was given instructions and counseling regarding her condition or for health maintenance advice. Please see specific information pulled into the AVS if appropriate.         This document has been electronically signed by YOANA Arguello on August 25, 2022 08:43 CDT

## 2022-08-26 ENCOUNTER — APPOINTMENT (OUTPATIENT)
Dept: SLEEP MEDICINE | Facility: HOSPITAL | Age: 57
End: 2022-08-26

## 2022-08-30 ENCOUNTER — LAB (OUTPATIENT)
Dept: LAB | Facility: HOSPITAL | Age: 57
End: 2022-08-30

## 2022-08-30 DIAGNOSIS — E78.00 HYPERCHOLESTEREMIA: ICD-10-CM

## 2022-08-30 DIAGNOSIS — R30.0 DYSURIA: ICD-10-CM

## 2022-08-30 DIAGNOSIS — K21.9 GASTROESOPHAGEAL REFLUX DISEASE WITHOUT ESOPHAGITIS: ICD-10-CM

## 2022-08-30 DIAGNOSIS — E89.0 POSTABLATIVE HYPOTHYROIDISM: ICD-10-CM

## 2022-08-30 PROCEDURE — 84439 ASSAY OF FREE THYROXINE: CPT

## 2022-08-30 PROCEDURE — 36415 COLL VENOUS BLD VENIPUNCTURE: CPT

## 2022-08-30 PROCEDURE — 80050 GENERAL HEALTH PANEL: CPT

## 2022-08-30 PROCEDURE — 80061 LIPID PANEL: CPT

## 2022-08-30 PROCEDURE — 81003 URINALYSIS AUTO W/O SCOPE: CPT

## 2022-08-31 ENCOUNTER — TELEPHONE (OUTPATIENT)
Dept: FAMILY MEDICINE CLINIC | Facility: CLINIC | Age: 57
End: 2022-08-31

## 2022-08-31 LAB
ALBUMIN SERPL-MCNC: 4.1 G/DL (ref 3.5–5.2)
ALBUMIN/GLOB SERPL: 1.3 G/DL
ALP SERPL-CCNC: 77 U/L (ref 39–117)
ALT SERPL W P-5'-P-CCNC: 13 U/L (ref 1–33)
ANION GAP SERPL CALCULATED.3IONS-SCNC: 8.3 MMOL/L (ref 5–15)
AST SERPL-CCNC: 21 U/L (ref 1–32)
BASOPHILS # BLD AUTO: 0.05 10*3/MM3 (ref 0–0.2)
BASOPHILS NFR BLD AUTO: 1 % (ref 0–1.5)
BILIRUB SERPL-MCNC: 0.4 MG/DL (ref 0–1.2)
BILIRUB UR QL STRIP: NEGATIVE
BUN SERPL-MCNC: 8 MG/DL (ref 6–20)
BUN/CREAT SERPL: 13.3 (ref 7–25)
CALCIUM SPEC-SCNC: 9.2 MG/DL (ref 8.6–10.5)
CHLORIDE SERPL-SCNC: 103 MMOL/L (ref 98–107)
CHOLEST SERPL-MCNC: 201 MG/DL (ref 0–200)
CLARITY UR: CLEAR
CO2 SERPL-SCNC: 26.7 MMOL/L (ref 22–29)
COLOR UR: YELLOW
CREAT SERPL-MCNC: 0.6 MG/DL (ref 0.57–1)
DEPRECATED RDW RBC AUTO: 39 FL (ref 37–54)
EGFRCR SERPLBLD CKD-EPI 2021: 106.2 ML/MIN/1.73
EOSINOPHIL # BLD AUTO: 0.16 10*3/MM3 (ref 0–0.4)
EOSINOPHIL NFR BLD AUTO: 3.2 % (ref 0.3–6.2)
ERYTHROCYTE [DISTWIDTH] IN BLOOD BY AUTOMATED COUNT: 12.3 % (ref 12.3–15.4)
GLOBULIN UR ELPH-MCNC: 3.1 GM/DL
GLUCOSE SERPL-MCNC: 81 MG/DL (ref 65–99)
GLUCOSE UR STRIP-MCNC: NEGATIVE MG/DL
HCT VFR BLD AUTO: 39.6 % (ref 34–46.6)
HDLC SERPL-MCNC: 59 MG/DL (ref 40–60)
HGB BLD-MCNC: 13.5 G/DL (ref 12–15.9)
HGB UR QL STRIP.AUTO: NEGATIVE
IMM GRANULOCYTES # BLD AUTO: 0.01 10*3/MM3 (ref 0–0.05)
IMM GRANULOCYTES NFR BLD AUTO: 0.2 % (ref 0–0.5)
KETONES UR QL STRIP: NEGATIVE
LDLC SERPL CALC-MCNC: 123 MG/DL (ref 0–100)
LDLC/HDLC SERPL: 2.05 {RATIO}
LEUKOCYTE ESTERASE UR QL STRIP.AUTO: NEGATIVE
LYMPHOCYTES # BLD AUTO: 1.44 10*3/MM3 (ref 0.7–3.1)
LYMPHOCYTES NFR BLD AUTO: 29.1 % (ref 19.6–45.3)
MCH RBC QN AUTO: 30.1 PG (ref 26.6–33)
MCHC RBC AUTO-ENTMCNC: 34.1 G/DL (ref 31.5–35.7)
MCV RBC AUTO: 88.4 FL (ref 79–97)
MONOCYTES # BLD AUTO: 0.35 10*3/MM3 (ref 0.1–0.9)
MONOCYTES NFR BLD AUTO: 7.1 % (ref 5–12)
NEUTROPHILS NFR BLD AUTO: 2.94 10*3/MM3 (ref 1.7–7)
NEUTROPHILS NFR BLD AUTO: 59.4 % (ref 42.7–76)
NITRITE UR QL STRIP: NEGATIVE
NRBC BLD AUTO-RTO: 0 /100 WBC (ref 0–0.2)
PH UR STRIP.AUTO: 8.5 [PH] (ref 5–8)
PLATELET # BLD AUTO: 351 10*3/MM3 (ref 140–450)
PMV BLD AUTO: 10.2 FL (ref 6–12)
POTASSIUM SERPL-SCNC: 4.9 MMOL/L (ref 3.5–5.2)
PROT SERPL-MCNC: 7.2 G/DL (ref 6–8.5)
PROT UR QL STRIP: ABNORMAL
RBC # BLD AUTO: 4.48 10*6/MM3 (ref 3.77–5.28)
SODIUM SERPL-SCNC: 138 MMOL/L (ref 136–145)
SP GR UR STRIP: 1.01 (ref 1–1.03)
T4 FREE SERPL-MCNC: 0.9 NG/DL (ref 0.93–1.7)
TRIGL SERPL-MCNC: 106 MG/DL (ref 0–150)
TSH SERPL DL<=0.05 MIU/L-ACNC: 2.1 UIU/ML (ref 0.27–4.2)
UROBILINOGEN UR QL STRIP: ABNORMAL
VLDLC SERPL-MCNC: 19 MG/DL (ref 5–40)
WBC NRBC COR # BLD: 4.95 10*3/MM3 (ref 3.4–10.8)

## 2022-08-31 NOTE — TELEPHONE ENCOUNTER
Per YOANA Kidd, Ms. Lopez has been called with recent lab results & recommendations.  Continue current medications and follow-up as planned or sooner if any problems.       ----- Message from YOANA Arguello sent at 8/31/2022  6:55 AM CDT -----  Urinalysis negative for urinary tract infection but does show trace of protein.  She needs to increase water in her diet.  TSH remains within normal limits but free T4 slightly low.  Continue medications as prescribed and I will continue to monitor thyroid studies on a routine basis.  Slight elevation in total cholesterol and bad cholesterol.  She needs to adhere to a low-fat diet and continue low impact exercise such as walking.  All other labs were essentially normal.

## 2022-10-12 DIAGNOSIS — K21.9 GASTROESOPHAGEAL REFLUX DISEASE WITHOUT ESOPHAGITIS: Primary | ICD-10-CM

## 2022-10-12 RX ORDER — OMEPRAZOLE 40 MG/1
40 CAPSULE, DELAYED RELEASE ORAL DAILY
Qty: 90 CAPSULE | Refills: 3 | Status: SHIPPED | OUTPATIENT
Start: 2022-10-12

## 2022-12-29 DIAGNOSIS — E03.9 HYPOTHYROIDISM, UNSPECIFIED TYPE: ICD-10-CM

## 2022-12-29 RX ORDER — TIZANIDINE 4 MG/1
4 TABLET ORAL EVERY 6 HOURS PRN
Qty: 360 TABLET | Refills: 2 | Status: SHIPPED | OUTPATIENT
Start: 2022-12-29

## 2022-12-29 RX ORDER — LEVOTHYROXINE SODIUM 0.03 MG/1
25 TABLET ORAL DAILY
Qty: 90 TABLET | Refills: 1 | Status: SHIPPED | OUTPATIENT
Start: 2022-12-29

## 2023-01-04 ENCOUNTER — LAB (OUTPATIENT)
Dept: LAB | Facility: HOSPITAL | Age: 58
End: 2023-01-04
Payer: COMMERCIAL

## 2023-01-04 ENCOUNTER — OFFICE VISIT (OUTPATIENT)
Dept: FAMILY MEDICINE CLINIC | Facility: CLINIC | Age: 58
End: 2023-01-04
Payer: COMMERCIAL

## 2023-01-04 VITALS
HEIGHT: 64 IN | BODY MASS INDEX: 29.26 KG/M2 | SYSTOLIC BLOOD PRESSURE: 112 MMHG | DIASTOLIC BLOOD PRESSURE: 74 MMHG | OXYGEN SATURATION: 96 % | HEART RATE: 67 BPM | WEIGHT: 171.4 LBS

## 2023-01-04 DIAGNOSIS — R10.31 RLQ ABDOMINAL PAIN: Primary | ICD-10-CM

## 2023-01-04 LAB
ALBUMIN SERPL-MCNC: 4.5 G/DL (ref 3.5–5.2)
ALBUMIN/GLOB SERPL: 1.5 G/DL
ALP SERPL-CCNC: 80 U/L (ref 39–117)
ALT SERPL W P-5'-P-CCNC: 17 U/L (ref 1–33)
ANION GAP SERPL CALCULATED.3IONS-SCNC: 10.9 MMOL/L (ref 5–15)
AST SERPL-CCNC: 21 U/L (ref 1–32)
BACTERIA UR QL AUTO: ABNORMAL /HPF
BILIRUB SERPL-MCNC: 0.4 MG/DL (ref 0–1.2)
BILIRUB UR QL STRIP: NEGATIVE
BUN SERPL-MCNC: 9 MG/DL (ref 6–20)
BUN/CREAT SERPL: 12.9 (ref 7–25)
CALCIUM SPEC-SCNC: 9.1 MG/DL (ref 8.6–10.5)
CHLORIDE SERPL-SCNC: 106 MMOL/L (ref 98–107)
CLARITY UR: CLEAR
CO2 SERPL-SCNC: 25.1 MMOL/L (ref 22–29)
COLOR UR: YELLOW
CREAT SERPL-MCNC: 0.7 MG/DL (ref 0.57–1)
EGFRCR SERPLBLD CKD-EPI 2021: 101.6 ML/MIN/1.73
GLOBULIN UR ELPH-MCNC: 3 GM/DL
GLUCOSE SERPL-MCNC: 94 MG/DL (ref 65–99)
GLUCOSE UR STRIP-MCNC: NEGATIVE MG/DL
HGB UR QL STRIP.AUTO: ABNORMAL
HYALINE CASTS UR QL AUTO: ABNORMAL /LPF
KETONES UR QL STRIP: NEGATIVE
LEUKOCYTE ESTERASE UR QL STRIP.AUTO: NEGATIVE
NITRITE UR QL STRIP: NEGATIVE
PH UR STRIP.AUTO: 7.5 [PH] (ref 5–8)
POTASSIUM SERPL-SCNC: 4.4 MMOL/L (ref 3.5–5.2)
PROT SERPL-MCNC: 7.5 G/DL (ref 6–8.5)
PROT UR QL STRIP: NEGATIVE
RBC # UR STRIP: ABNORMAL /HPF
REF LAB TEST METHOD: ABNORMAL
SODIUM SERPL-SCNC: 142 MMOL/L (ref 136–145)
SP GR UR STRIP: 1.01 (ref 1–1.03)
SQUAMOUS #/AREA URNS HPF: ABNORMAL /HPF
UROBILINOGEN UR QL STRIP: ABNORMAL
WBC # UR STRIP: ABNORMAL /HPF

## 2023-01-04 PROCEDURE — 81001 URINALYSIS AUTO W/SCOPE: CPT | Performed by: NURSE PRACTITIONER

## 2023-01-04 PROCEDURE — 1160F RVW MEDS BY RX/DR IN RCRD: CPT | Performed by: NURSE PRACTITIONER

## 2023-01-04 PROCEDURE — 85025 COMPLETE CBC W/AUTO DIFF WBC: CPT | Performed by: NURSE PRACTITIONER

## 2023-01-04 PROCEDURE — 99213 OFFICE O/P EST LOW 20 MIN: CPT | Performed by: NURSE PRACTITIONER

## 2023-01-04 PROCEDURE — 1159F MED LIST DOCD IN RCRD: CPT | Performed by: NURSE PRACTITIONER

## 2023-01-04 PROCEDURE — 80053 COMPREHEN METABOLIC PANEL: CPT | Performed by: NURSE PRACTITIONER

## 2023-01-04 NOTE — PROGRESS NOTES
Chief Complaint  Abdominal Pain    Subjective  Over the past month has been complaining with intermittent right lower quadrant pain that has become daily over the last 3 days.      Nerissa Lopez presents to [facility] [place]  Abdominal Pain  This is a new problem. The current episode started more than 1 month ago. The onset quality is gradual. The problem occurs constantly. The problem has been gradually worsening. The pain is located in the RLQ. The pain is moderate. The quality of the pain is colicky. The abdominal pain does not radiate. Pertinent negatives include no anorexia, constipation, diarrhea, dysuria, frequency, hematuria, nausea or vomiting. Nothing aggravates the pain. The pain is relieved by nothing. She has tried nothing for the symptoms. The treatment provided no relief.     1/4/2023  Allergies   Allergen Reactions   • Sulfa Antibiotics Arrhythmia   • Kenalog [Triamcinolone Acetonide] Palpitations   • Medrol [Methylprednisolone] Palpitations   • Prednisone Palpitations       Objective   Vital Signs:  /74   Pulse 67   Ht 162.6 cm (64\")   Wt 77.7 kg (171 lb 6.4 oz)   SpO2 96%   BMI 29.42 kg/m²   Estimated body mass index is 29.42 kg/m² as calculated from the following:    Height as of this encounter: 162.6 cm (64\").    Weight as of this encounter: 77.7 kg (171 lb 6.4 oz).      Physical Exam  Vitals and nursing note reviewed.   Constitutional:       Appearance: She is well-developed.   Cardiovascular:      Rate and Rhythm: Normal rate and regular rhythm.      Heart sounds: Normal heart sounds.   Pulmonary:      Effort: Pulmonary effort is normal.      Breath sounds: Normal breath sounds.   Abdominal:      General: Bowel sounds are normal.      Palpations: Abdomen is soft.      Tenderness: There is no abdominal tenderness. There is no right CVA tenderness.   Musculoskeletal:         General: Normal range of motion.      Cervical back: Normal  range of motion and neck supple.   Skin:     General: Skin is warm.      Capillary Refill: Capillary refill takes less than 2 seconds.   Neurological:      Mental Status: She is alert and oriented to person, place, and time.   Psychiatric:         Behavior: Behavior normal.        Result Review :                Assessment and Plan   Diagnoses and all orders for this visit:    1. RLQ abdominal pain (Primary)  -     XR Abdomen Flat & Upright  -     CBC & Differential  -     Comprehensive Metabolic Panel  -     Urinalysis With Culture If Indicated - Urine, Clean Catch    1.  Right lower quadrant abdominal pain:  Complete CBC and chemistry panel as ordered and will notify results when available  Complete urinalysis as ordered will notify results when available  Complete flat upright abdominal x-ray as ordered and will notify of results when available  Discussed possibility of CT of the abdomen and pelvis pending outcome of test result  Encouraged to seek emergency medical treatment for any new or worsening abdominal pain, hematochezia or melena       Follow Up   Return if symptoms worsen or fail to improve, for Next scheduled follow up.  Patient was given instructions and counseling regarding her condition or for health maintenance advice. Please see specific information pulled into the AVS if appropriate.         This document has been electronically signed by YOANA Arguello on January 4, 2023 11:26 CST

## 2023-01-05 ENCOUNTER — TELEPHONE (OUTPATIENT)
Dept: FAMILY MEDICINE CLINIC | Facility: CLINIC | Age: 58
End: 2023-01-05
Payer: COMMERCIAL

## 2023-01-05 LAB
BASOPHILS # BLD AUTO: 0.04 10*3/MM3 (ref 0–0.2)
BASOPHILS NFR BLD AUTO: 0.9 % (ref 0–1.5)
DEPRECATED RDW RBC AUTO: 41.7 FL (ref 37–54)
EOSINOPHIL # BLD AUTO: 0.13 10*3/MM3 (ref 0–0.4)
EOSINOPHIL NFR BLD AUTO: 2.9 % (ref 0.3–6.2)
ERYTHROCYTE [DISTWIDTH] IN BLOOD BY AUTOMATED COUNT: 12.4 % (ref 12.3–15.4)
HCT VFR BLD AUTO: 40.6 % (ref 34–46.6)
HGB BLD-MCNC: 13.3 G/DL (ref 12–15.9)
IMM GRANULOCYTES # BLD AUTO: 0.01 10*3/MM3 (ref 0–0.05)
IMM GRANULOCYTES NFR BLD AUTO: 0.2 % (ref 0–0.5)
LYMPHOCYTES # BLD AUTO: 1.45 10*3/MM3 (ref 0.7–3.1)
LYMPHOCYTES NFR BLD AUTO: 32.2 % (ref 19.6–45.3)
MCH RBC QN AUTO: 30 PG (ref 26.6–33)
MCHC RBC AUTO-ENTMCNC: 32.8 G/DL (ref 31.5–35.7)
MCV RBC AUTO: 91.6 FL (ref 79–97)
MONOCYTES # BLD AUTO: 0.32 10*3/MM3 (ref 0.1–0.9)
MONOCYTES NFR BLD AUTO: 7.1 % (ref 5–12)
NEUTROPHILS NFR BLD AUTO: 2.56 10*3/MM3 (ref 1.7–7)
NEUTROPHILS NFR BLD AUTO: 56.7 % (ref 42.7–76)
NRBC BLD AUTO-RTO: 0 /100 WBC (ref 0–0.2)
PLATELET # BLD AUTO: 366 10*3/MM3 (ref 140–450)
PMV BLD AUTO: 10.6 FL (ref 6–12)
RBC # BLD AUTO: 4.43 10*6/MM3 (ref 3.77–5.28)
WBC NRBC COR # BLD: 4.51 10*3/MM3 (ref 3.4–10.8)

## 2023-01-05 NOTE — TELEPHONE ENCOUNTER
Per YOANA Kidd, Ms. Lopez has been called with recent lab results & recommendations.  Continue current medications and follow-up as planned or sooner if any problems.       ----- Message from YOANA Arguello sent at 1/5/2023  6:40 AM CST -----  Urinalysis showed trace amount of blood but negative for urinary tract infection.  Lab work was normal.  I am still waiting on the results of her x-ray.

## 2023-01-06 ENCOUNTER — TELEPHONE (OUTPATIENT)
Dept: FAMILY MEDICINE CLINIC | Facility: CLINIC | Age: 58
End: 2023-01-06
Payer: COMMERCIAL

## 2023-01-06 NOTE — TELEPHONE ENCOUNTER
Per YOANA Kidd, Ms. Lopez has been called with recent Abdominal x-ray results & recommendations.  Continue current medications and follow-up as planned or sooner if any problems.     She states she is still having some pain, maybe not as bad as before but it is still there      ----- Message from YOANA Arguello sent at 1/6/2023  2:08 PM CST -----  Abdominal x-ray was unremarkable.  If pain persist I can proceed with trying to order a CT however her abdomen and pelvis

## 2023-01-06 NOTE — PROGRESS NOTES
Per YOANA Kidd, Ms. Lopez has been called with recent Abdominal x-ray results & recommendations.  Continue current medications and follow-up as planned or sooner if any problems.     She states she is still having some pain, maybe not as bad as before but it is still there

## 2023-01-09 DIAGNOSIS — F41.1 GENERALIZED ANXIETY DISORDER: ICD-10-CM

## 2023-01-09 DIAGNOSIS — R10.31 RLQ ABDOMINAL PAIN: Primary | ICD-10-CM

## 2023-01-09 RX ORDER — ESCITALOPRAM OXALATE 10 MG/1
10 TABLET ORAL DAILY
Qty: 90 TABLET | Refills: 1 | Status: SHIPPED | OUTPATIENT
Start: 2023-01-09

## 2023-01-26 ENCOUNTER — HOSPITAL ENCOUNTER (OUTPATIENT)
Dept: CT IMAGING | Facility: HOSPITAL | Age: 58
Discharge: HOME | End: 2023-01-26
Admitting: NURSE PRACTITIONER
Payer: COMMERCIAL

## 2023-01-26 DIAGNOSIS — R10.31 RLQ ABDOMINAL PAIN: ICD-10-CM

## 2023-01-26 PROCEDURE — 25010000002 IOPAMIDOL 61 % SOLUTION: Performed by: NURSE PRACTITIONER

## 2023-01-26 PROCEDURE — 74177 CT ABD & PELVIS W/CONTRAST: CPT

## 2023-01-26 RX ADMIN — IOPAMIDOL 90 ML: 612 INJECTION, SOLUTION INTRAVENOUS at 10:36

## 2023-01-27 ENCOUNTER — TELEPHONE (OUTPATIENT)
Dept: FAMILY MEDICINE CLINIC | Facility: CLINIC | Age: 58
End: 2023-01-27
Payer: COMMERCIAL

## 2023-01-27 NOTE — TELEPHONE ENCOUNTER
Per YOANA Kidd, Ms. Lopez  has been called with recent CT abdomen results & recommendations.  Continue current medications and follow-up as planned or sooner if any problems.     Please refer to Dr. Marroquin, she thinks she has seen him before      ----- Message from YOANA Arguello sent at 1/26/2023  4:10 PM CST -----  CT shows mild right pelvocatelectasis (dilated renal pelvis and calyces due to urine) but findings are similar to previous exam.  Pain is more than likely from the bilateral L5 pars defect (stress fracture) with grade 1 anterior listhesis.  She needs to follow-up with a neurosurgeon.  Please ask who she would like to see?  In the meantime no heavy lifting or bending of anything heavier than a gallon of milk

## 2023-01-27 NOTE — PROGRESS NOTES
Per YOANA Kidd, Ms. Lopez  has been called with recent CT abdomen results & recommendations.  Continue current medications and follow-up as planned or sooner if any problems.     Please refer to Dr. Marroquin, she thinks she has seen him before

## 2023-01-30 DIAGNOSIS — M43.06 LUMBAR PARS DEFECT: Primary | ICD-10-CM

## 2023-02-24 ENCOUNTER — OFFICE VISIT (OUTPATIENT)
Dept: FAMILY MEDICINE CLINIC | Facility: CLINIC | Age: 58
End: 2023-02-24
Payer: COMMERCIAL

## 2023-02-24 VITALS
SYSTOLIC BLOOD PRESSURE: 108 MMHG | OXYGEN SATURATION: 97 % | WEIGHT: 168.9 LBS | DIASTOLIC BLOOD PRESSURE: 66 MMHG | HEIGHT: 64 IN | HEART RATE: 66 BPM | BODY MASS INDEX: 28.83 KG/M2

## 2023-02-24 DIAGNOSIS — K21.9 GASTROESOPHAGEAL REFLUX DISEASE WITHOUT ESOPHAGITIS: ICD-10-CM

## 2023-02-24 DIAGNOSIS — F33.1 MODERATE EPISODE OF RECURRENT MAJOR DEPRESSIVE DISORDER: ICD-10-CM

## 2023-02-24 DIAGNOSIS — E78.00 HYPERCHOLESTEREMIA: Primary | ICD-10-CM

## 2023-02-24 DIAGNOSIS — E89.0 POSTABLATIVE HYPOTHYROIDISM: ICD-10-CM

## 2023-02-24 DIAGNOSIS — R53.82 CHRONIC FATIGUE: ICD-10-CM

## 2023-02-24 DIAGNOSIS — F41.1 GENERALIZED ANXIETY DISORDER: ICD-10-CM

## 2023-02-24 DIAGNOSIS — M54.50 LUMBAR BACK PAIN: ICD-10-CM

## 2023-02-24 PROCEDURE — 99214 OFFICE O/P EST MOD 30 MIN: CPT | Performed by: NURSE PRACTITIONER

## 2023-02-24 PROCEDURE — 96372 THER/PROPH/DIAG INJ SC/IM: CPT | Performed by: NURSE PRACTITIONER

## 2023-02-24 RX ORDER — CYANOCOBALAMIN 1000 UG/ML
1000 INJECTION, SOLUTION INTRAMUSCULAR; SUBCUTANEOUS
Status: SHIPPED | OUTPATIENT
Start: 2023-02-24

## 2023-02-24 RX ADMIN — CYANOCOBALAMIN 1000 MCG: 1000 INJECTION, SOLUTION INTRAMUSCULAR; SUBCUTANEOUS at 08:50

## 2023-02-24 NOTE — PROGRESS NOTES
Chief Complaint  Anxiety (Follow up ) and Hypothyroidism    Subjective        Nerissa Lopez presents to [facility] - [place]  History of Present Illness  HPI:  Annual physical exam   Back Pain  This is a recurrent problem. The current episode started more than 1 year ago. The problem occurs constantly. The problem has been gradually worsening since onset. The pain is present in the gluteal, lumbar spine, sacro-iliac and thoracic spine. The quality of the pain is described as aching, burning, cramping and shooting. The pain radiates to the right thigh. The pain is at a severity of 6/10. The pain is severe. The pain is the same all the time. The symptoms are aggravated by bending, position, sitting, standing and twisting. Stiffness is present all day. Associated symptoms include leg pain and tingling. Pertinent negatives include no abdominal pain. Risk factors include history of osteoporosis, menopause and poor posture. She has tried muscle relaxant, NSAIDs and analgesics for the symptoms. The treatment provided mild relief.   Anxiety  Presents for follow-up visit. Onset was 1 to 5 years ago. The problem has been gradually improving. Symptoms include decreased concentration, depressed mood, excessive worry, malaise, muscle tension and restlessness. Patient reports no compulsions, confusion, feeling of choking, hyperventilation, obsessions, panic or suicidal ideas. Symptoms occur occasionally. The severity of symptoms is moderate. The quality of sleep is fair. Nighttime awakenings: one to two.     Her past medical history is significant for depression. Compliance with medications is %.   Thyroid Problem  Presents for follow-up visit. Symptoms include depressed mood and fatigue. The symptoms have been stable. Her past medical history is significant for hyperlipidemia.   Depression  Visit Type: follow-up  Patient presents with the following symptoms: decreased  concentration, depressed mood, excessive worry, feelings of hopelessness, malaise, muscle tension and restlessness.  Patient is not experiencing: compulsions, confusion, hyperventilation, obsessions, panic, suicidal ideas, suicidal planning and thoughts of death.  Frequency of symptoms: constantly   Severity: severe   Sleep quality: fair  Nighttime awakenings: several    Heartburn  She complains of belching, globus sensation, heartburn and water brash. She reports no abdominal pain. This is a chronic problem. The current episode started more than 1 year ago. The problem occurs occasionally. The problem has been unchanged. The heartburn does not wake her from sleep. The heartburn does not limit her activity. The heartburn doesn't change with position. The symptoms are aggravated by certain foods and stress. Associated symptoms include fatigue. Risk factors include caffeine use. She has tried a PPI for the symptoms. The treatment provided significant relief.   Hyperlipidemia  This is a chronic problem. The current episode started more than 1 year ago. The problem is controlled. Factors aggravating her hyperlipidemia include fatty foods. Associated symptoms include leg pain. Current antihyperlipidemic treatment includes diet change. The current treatment provides moderate improvement of lipids. There are no compliance problems.  Risk factors for coronary artery disease include dyslipidemia and stress.   Fatigue  This is a chronic problem. The current episode started more than 1 month ago. The problem occurs constantly. The problem has been gradually worsening. Associated symptoms include fatigue. Pertinent negatives include no abdominal pain. The symptoms are aggravated by stress and exertion. She has tried rest for the symptoms. The treatment provided mild relief.     Current Outpatient Medications on File Prior to Visit   Medication Sig Dispense Refill   • Ascorbic Acid (Vitamin C) 500 MG capsule Take 1 each by mouth  Daily.     • aspirin 325 MG tablet Take 325 mg by mouth 2 (two) times a day.     • Calcium Carbonate 1500 (600 Ca) MG tablet Take 1 tablet by mouth 2 (Two) Times a Day With Meals. 180 tablet 2   • cholecalciferol (VITAMIN D3) 25 MCG (1000 UT) tablet Take 1,000 Units by mouth Daily.     • coenzyme Q10 100 MG capsule Take 200 mg by mouth Daily.     • cyanocobalamin (VITAMIN B-12) 500 MCG tablet Take 500 mcg by mouth Daily.     • Diclofenac Sodium (VOLTAREN) 1 % gel gel Apply to painful joints BID x 2 wks then PRN     • digoxin (LANOXIN) 125 MCG tablet Take 125 mcg by mouth Daily.     • docusate sodium (COLACE) 100 MG capsule Take 200 mg by mouth 2 (Two) Times a Day.     • escitalopram (LEXAPRO) 10 MG tablet Take 1 tablet by mouth Daily. 90 tablet 1   • fluticasone (FLONASE) 50 MCG/ACT nasal spray 2 sprays into the nostril(s) as directed by provider Daily. Administer 2 sprays in each nostril for each dose. 16 g 2   • HYDROcodone-acetaminophen (NORCO) 5-325 MG per tablet Take 1 tablet by mouth.     • levothyroxine (SYNTHROID, LEVOTHROID) 25 MCG tablet Take 1 tablet by mouth Daily. 90 tablet 1   • lidocaine (XYLOCAINE) 5 % ointment Apply  topically to the appropriate area as directed Every 2 (Two) Hours As Needed for Mild Pain . 50 g 1   • LORazepam (ATIVAN) 0.5 MG tablet Take 1 tablet by mouth Daily As Needed for Anxiety. 30 tablet 0   • magnesium oxide (MAGOX) 400 (241.3 Mg) MG tablet tablet Take 800 mg by mouth Daily.     • meloxicam (MOBIC) 15 MG tablet Take 15 mg by mouth Daily.     • Multiple Vitamins-Minerals (CENTRUM VITAMINTS PO) Take 1 tablet by mouth Daily.     • naloxone (NARCAN) 4 MG/0.1ML nasal spray 1 spray into the nostril(s) as directed by provider As Needed (opiod overdose). 1 each 1   • Omega-3 Fatty Acids (FISH OIL CONCENTRATE) 1000 MG capsule Take 1 capsule by mouth 2 (two) times a day.     • omeprazole (priLOSEC) 40 MG capsule Take 1 capsule by mouth Daily. 90 capsule 3   • ondansetron (ZOFRAN) 4 MG  "tablet Take 8 mg by mouth 2 (Two) Times a Day As Needed for Nausea.     • polyethylene glycol (MIRALAX) 17 GM/SCOOP powder Take 17 g by mouth Daily. 765 g 5   • tiZANidine (ZANAFLEX) 4 MG tablet Take 1 tablet by mouth Every 6 (Six) Hours As Needed for Muscle Spasms. 360 tablet 2   • vitamin E 400 UNIT capsule Take 400 Units by mouth daily.       No current facility-administered medications on file prior to visit.     Allergies   Allergen Reactions   • Sulfa Antibiotics Arrhythmia   • Kenalog [Triamcinolone Acetonide] Palpitations   • Medrol [Methylprednisolone] Palpitations   • Prednisone Palpitations       Objective   Vital Signs:  /66   Pulse 66   Ht 162.6 cm (64\")   Wt 76.6 kg (168 lb 14.4 oz)   SpO2 97%   BMI 28.99 kg/m²   Estimated body mass index is 28.99 kg/m² as calculated from the following:    Height as of this encounter: 162.6 cm (64\").    Weight as of this encounter: 76.6 kg (168 lb 14.4 oz).         Physical Exam  Vitals and nursing note reviewed.   Constitutional:       Appearance: Normal appearance. She is well-developed.   HENT:      Head: Normocephalic.   Cardiovascular:      Rate and Rhythm: Normal rate and regular rhythm.      Heart sounds: Normal heart sounds.   Pulmonary:      Effort: Pulmonary effort is normal.      Breath sounds: Normal breath sounds.   Abdominal:      General: Bowel sounds are normal.      Palpations: Abdomen is soft.   Musculoskeletal:      Cervical back: Normal range of motion and neck supple.      Lumbar back: Tenderness and bony tenderness present. Decreased range of motion.   Skin:     General: Skin is warm.      Capillary Refill: Capillary refill takes less than 2 seconds.   Neurological:      Mental Status: She is alert and oriented to person, place, and time.   Psychiatric:         Behavior: Behavior normal.        Result Review :  The following data was reviewed by: YOANA Arguello on 02/24/2023:  CMP    CMP 8/30/22 1/4/23   Glucose 81 94   BUN 8 9 "   Creatinine 0.60 0.70   eGFR 106.2 101.6   Sodium 138 142   Potassium 4.9 4.4   Chloride 103 106   Calcium 9.2 9.1   Total Protein 7.2 7.5   Albumin 4.10 4.5   Globulin 3.1 3.0   Total Bilirubin 0.4 0.4   Alkaline Phosphatase 77 80   AST (SGOT) 21 21   ALT (SGPT) 13 17   Albumin/Globulin Ratio 1.3 1.5   BUN/Creatinine Ratio 13.3 12.9   Anion Gap 8.3 10.9      Comments are available for some flowsheets but are not being displayed.           CBC    CBC 8/30/22 1/4/23   WBC 4.95 4.51   RBC 4.48 4.43   Hemoglobin 13.5 13.3   Hematocrit 39.6 40.6   MCV 88.4 91.6   MCH 30.1 30.0   MCHC 34.1 32.8   RDW 12.3 12.4   Platelets 351 366           TSH    TSH 8/30/22   TSH 2.100                        Assessment and Plan   Diagnoses and all orders for this visit:    1. Hypercholesteremia (Primary)  -     Lipid panel; Future    2. Postablative hypothyroidism  -     TSH; Future  -     T4, free; Future    3. Gastroesophageal reflux disease without esophagitis  -     CBC & Differential; Future  -     Comprehensive Metabolic Panel; Future    4. Moderate episode of recurrent major depressive disorder (HCC)    5. Generalized anxiety disorder    6. Chronic fatigue  -     cyanocobalamin injection 1,000 mcg    7. Lumbar back pain      1.  Hypercholesterolemia:  Complete fasting lipid panel as ordered will notify of results when available  Continue to adhere to a low-fat diet    2.  Postablative hypothyroidism:  Complete TSH and free T4 as ordered, notify of results when available  Continue levothyroxine as previously prescribed    3.  Gastroesophageal reflux disease without esophagitis:  Complete CBC and chemistry panel as ordered and will notify of results when available  Continue omeprazole as previously prescribed  Avoid lying down for minimum of 2 hours after eating to help reduce flareups    4.  Moderate episode of recurrent major depressive disorder:  Continue Lexapro as previously prescribed    5.  Generalized anxiety  disorder:  Continue lorazepam as previously prescribed    6.  Chronic fatigue:  Vitamin B12 1000 mcg given IM in office  Continue oral vitamin B12 supplement as previously prescribed  Encouraged frequent rest.  Throughout the day    7.  Lumbar back pain:  Continue meloxicam as previously prescribed  Continue Norco as prescribed for pain management       Follow Up   Return in about 6 months (around 8/24/2023) for Annual physical.  Patient was given instructions and counseling regarding her condition or for health maintenance advice. Please see specific information pulled into the AVS if appropriate.         This document has been electronically signed by YOANA Arguello on February 27, 2023 07:10 CST

## 2023-02-27 DIAGNOSIS — M43.06 PARS DEFECT OF LUMBAR SPINE: Primary | ICD-10-CM

## 2023-03-14 ENCOUNTER — TELEPHONE (OUTPATIENT)
Dept: FAMILY MEDICINE CLINIC | Facility: CLINIC | Age: 58
End: 2023-03-14
Payer: COMMERCIAL

## 2023-03-14 DIAGNOSIS — M43.06 PARS DEFECT OF LUMBAR SPINE: Primary | ICD-10-CM

## 2023-03-14 DIAGNOSIS — M54.50 CHRONIC MIDLINE LOW BACK PAIN WITHOUT SCIATICA: ICD-10-CM

## 2023-03-14 DIAGNOSIS — G89.29 CHRONIC MIDLINE LOW BACK PAIN WITHOUT SCIATICA: ICD-10-CM

## 2023-03-14 NOTE — TELEPHONE ENCOUNTER
Patient called back and stated that she had started Prolia before Covid started and seemed to help, but she quit because she did not want to be out any more than she had too at that time.  She wanted to know if it was possible for her to get started with Prolia again?

## 2023-03-15 DIAGNOSIS — M81.6 LOCALIZED OSTEOPOROSIS WITHOUT CURRENT PATHOLOGICAL FRACTURE: Primary | ICD-10-CM

## 2023-03-15 NOTE — TELEPHONE ENCOUNTER
Notified patient of new referral being placed. Notified patient if someone hasn't contact her from the [facility] office then to call and let us know so we could see what is going on. Patient verbally understood.

## 2023-04-10 ENCOUNTER — LAB (OUTPATIENT)
Dept: LAB | Facility: HOSPITAL | Age: 58
End: 2023-04-10
Payer: COMMERCIAL

## 2023-04-10 DIAGNOSIS — E89.0 POSTABLATIVE HYPOTHYROIDISM: ICD-10-CM

## 2023-04-10 DIAGNOSIS — E78.00 HYPERCHOLESTEREMIA: ICD-10-CM

## 2023-04-10 DIAGNOSIS — K21.9 GASTROESOPHAGEAL REFLUX DISEASE WITHOUT ESOPHAGITIS: ICD-10-CM

## 2023-04-10 LAB
ALBUMIN SERPL-MCNC: 4.2 G/DL (ref 3.5–5.2)
ALBUMIN/GLOB SERPL: 1.4 G/DL
ALP SERPL-CCNC: 70 U/L (ref 39–117)
ALT SERPL W P-5'-P-CCNC: 14 U/L (ref 1–33)
ANION GAP SERPL CALCULATED.3IONS-SCNC: 10.8 MMOL/L (ref 5–15)
AST SERPL-CCNC: 20 U/L (ref 1–32)
BASOPHILS # BLD AUTO: 0.06 10*3/MM3 (ref 0–0.2)
BASOPHILS NFR BLD AUTO: 1.2 % (ref 0–1.5)
BILIRUB SERPL-MCNC: 0.3 MG/DL (ref 0–1.2)
BUN SERPL-MCNC: 10 MG/DL (ref 6–20)
BUN/CREAT SERPL: 14.5 (ref 7–25)
CALCIUM SPEC-SCNC: 9.6 MG/DL (ref 8.6–10.5)
CHLORIDE SERPL-SCNC: 102 MMOL/L (ref 98–107)
CHOLEST SERPL-MCNC: 209 MG/DL (ref 0–200)
CO2 SERPL-SCNC: 25.2 MMOL/L (ref 22–29)
CREAT SERPL-MCNC: 0.69 MG/DL (ref 0.57–1)
DEPRECATED RDW RBC AUTO: 40.8 FL (ref 37–54)
EGFRCR SERPLBLD CKD-EPI 2021: 102 ML/MIN/1.73
EOSINOPHIL # BLD AUTO: 0.21 10*3/MM3 (ref 0–0.4)
EOSINOPHIL NFR BLD AUTO: 4.2 % (ref 0.3–6.2)
ERYTHROCYTE [DISTWIDTH] IN BLOOD BY AUTOMATED COUNT: 12.5 % (ref 12.3–15.4)
GLOBULIN UR ELPH-MCNC: 3.1 GM/DL
GLUCOSE SERPL-MCNC: 105 MG/DL (ref 65–99)
HCT VFR BLD AUTO: 38.6 % (ref 34–46.6)
HDLC SERPL-MCNC: 61 MG/DL (ref 40–60)
HGB BLD-MCNC: 13.4 G/DL (ref 12–15.9)
IMM GRANULOCYTES # BLD AUTO: 0.01 10*3/MM3 (ref 0–0.05)
IMM GRANULOCYTES NFR BLD AUTO: 0.2 % (ref 0–0.5)
LDLC SERPL CALC-MCNC: 133 MG/DL (ref 0–100)
LDLC/HDLC SERPL: 2.16 {RATIO}
LYMPHOCYTES # BLD AUTO: 1.48 10*3/MM3 (ref 0.7–3.1)
LYMPHOCYTES NFR BLD AUTO: 29.4 % (ref 19.6–45.3)
MCH RBC QN AUTO: 31.3 PG (ref 26.6–33)
MCHC RBC AUTO-ENTMCNC: 34.7 G/DL (ref 31.5–35.7)
MCV RBC AUTO: 90.2 FL (ref 79–97)
MONOCYTES # BLD AUTO: 0.43 10*3/MM3 (ref 0.1–0.9)
MONOCYTES NFR BLD AUTO: 8.5 % (ref 5–12)
NEUTROPHILS NFR BLD AUTO: 2.85 10*3/MM3 (ref 1.7–7)
NEUTROPHILS NFR BLD AUTO: 56.5 % (ref 42.7–76)
NRBC BLD AUTO-RTO: 0 /100 WBC (ref 0–0.2)
PLATELET # BLD AUTO: 366 10*3/MM3 (ref 140–450)
PMV BLD AUTO: 10.4 FL (ref 6–12)
POTASSIUM SERPL-SCNC: 4.5 MMOL/L (ref 3.5–5.2)
PROT SERPL-MCNC: 7.3 G/DL (ref 6–8.5)
RBC # BLD AUTO: 4.28 10*6/MM3 (ref 3.77–5.28)
SODIUM SERPL-SCNC: 138 MMOL/L (ref 136–145)
T4 FREE SERPL-MCNC: 0.92 NG/DL (ref 0.93–1.7)
TRIGL SERPL-MCNC: 82 MG/DL (ref 0–150)
TSH SERPL DL<=0.05 MIU/L-ACNC: 1.95 UIU/ML (ref 0.27–4.2)
VLDLC SERPL-MCNC: 15 MG/DL (ref 5–40)
WBC NRBC COR # BLD: 5.04 10*3/MM3 (ref 3.4–10.8)

## 2023-04-10 PROCEDURE — 80061 LIPID PANEL: CPT

## 2023-04-10 PROCEDURE — 84439 ASSAY OF FREE THYROXINE: CPT

## 2023-04-10 PROCEDURE — 80050 GENERAL HEALTH PANEL: CPT

## 2023-04-10 PROCEDURE — 36415 COLL VENOUS BLD VENIPUNCTURE: CPT

## 2023-04-12 ENCOUNTER — TELEPHONE (OUTPATIENT)
Dept: FAMILY MEDICINE CLINIC | Facility: CLINIC | Age: 58
End: 2023-04-12
Payer: COMMERCIAL

## 2023-04-12 NOTE — TELEPHONE ENCOUNTER
Per YOANA Kidd, Ms. Lopez has been called with recent lab results & recommendations.  Continue current medications and follow-up as planned or sooner if any problems.       ----- Message from YOANA Arguello sent at 4/11/2023  7:23 AM CDT -----  Free T4 slightly low but has improved and TSH remains within normal limits.  We will repeat thyroid studies at next appointment.  Total cholesterol and good cholesterol have increased slightly.  She needs to reduce saturated fats and in her diet and increase low-impact exercises such as walking.  I will repeat fasting lipid panel at next appointment.  All other labs were essentially normal.

## 2023-05-31 ENCOUNTER — TELEPHONE (OUTPATIENT)
Dept: FAMILY MEDICINE CLINIC | Facility: CLINIC | Age: 58
End: 2023-05-31

## 2023-05-31 NOTE — TELEPHONE ENCOUNTER
Patient went to Urgent Care on Saturday. She has sinus issues/drainage/red, itchy eyes. They told her that it was pink eye but she thinks it is more like allergies. They gave her drops for pink eye. Can you call in something for allergies to [facility]. Phone is [phone].

## 2023-08-09 RX ORDER — MELOXICAM 15 MG/1
15 TABLET ORAL DAILY
Qty: 60 TABLET | Refills: 1 | Status: SHIPPED | OUTPATIENT
Start: 2023-08-09

## 2023-08-28 ENCOUNTER — OFFICE VISIT (OUTPATIENT)
Dept: FAMILY MEDICINE CLINIC | Facility: CLINIC | Age: 58
End: 2023-08-28
Payer: COMMERCIAL

## 2023-08-28 VITALS
WEIGHT: 170.9 LBS | HEART RATE: 75 BPM | SYSTOLIC BLOOD PRESSURE: 108 MMHG | HEIGHT: 64 IN | DIASTOLIC BLOOD PRESSURE: 72 MMHG | OXYGEN SATURATION: 98 % | BODY MASS INDEX: 29.18 KG/M2

## 2023-08-28 DIAGNOSIS — R53.82 CHRONIC FATIGUE: ICD-10-CM

## 2023-08-28 DIAGNOSIS — M54.50 CHRONIC MIDLINE LOW BACK PAIN WITHOUT SCIATICA: ICD-10-CM

## 2023-08-28 DIAGNOSIS — E78.00 HYPERCHOLESTEREMIA: ICD-10-CM

## 2023-08-28 DIAGNOSIS — G89.29 CHRONIC MIDLINE LOW BACK PAIN WITHOUT SCIATICA: ICD-10-CM

## 2023-08-28 DIAGNOSIS — Z12.31 SCREENING MAMMOGRAM FOR BREAST CANCER: ICD-10-CM

## 2023-08-28 DIAGNOSIS — E89.0 POSTABLATIVE HYPOTHYROIDISM: ICD-10-CM

## 2023-08-28 DIAGNOSIS — L98.9 SKIN LESION: ICD-10-CM

## 2023-08-28 DIAGNOSIS — Z00.00 ANNUAL PHYSICAL EXAM: Primary | ICD-10-CM

## 2023-08-28 DIAGNOSIS — K21.9 GASTROESOPHAGEAL REFLUX DISEASE WITHOUT ESOPHAGITIS: ICD-10-CM

## 2023-08-28 DIAGNOSIS — F41.1 GENERALIZED ANXIETY DISORDER: ICD-10-CM

## 2023-08-28 RX ORDER — CYANOCOBALAMIN 1000 UG/ML
1000 INJECTION, SOLUTION INTRAMUSCULAR; SUBCUTANEOUS
Status: SHIPPED | OUTPATIENT
Start: 2023-08-28

## 2023-08-28 NOTE — PROGRESS NOTES
"Chief Complaint  Annual Exam    Subjective        Nerissa Lopez presents to [facility] [place]  Annual physical exam.  Continues to complain of chronic fatigue and tiredness.  \"There are some days where I just cannot even hardly put one foot in front of the other.\"    History of Present Illness  HPI: Annual physical exam  Back Pain  This is a recurrent problem. The current episode started more than 1 year ago. The problem occurs constantly. The problem has been gradually worsening since onset. The pain is present in the gluteal, lumbar spine, sacro-iliac and thoracic spine. The quality of the pain is described as aching, burning, cramping and shooting. The pain radiates to the right thigh. The pain is at a severity of 6/10. The pain is severe. The pain is The same all the time. The symptoms are aggravated by bending, position, sitting, standing and twisting. Stiffness is present All day. Associated symptoms include leg pain and tingling. Pertinent negatives include no abdominal pain. Risk factors include history of osteoporosis, menopause and poor posture. She has tried muscle relaxant, NSAIDs and analgesics for the symptoms. The treatment provided mild relief.   Anxiety  Presents for follow-up visit. Symptoms include decreased concentration, depressed mood, excessive worry, malaise, muscle tension and restlessness. Patient reports no compulsions, confusion, feeling of choking, hyperventilation, obsessions, panic or suicidal ideas. Symptoms occur occasionally. The severity of symptoms is moderate. The patient sleeps 6 hours per night. The quality of sleep is fair. Nighttime awakenings: one to two.     Her past medical history is significant for depression. Compliance with medications is %.   Thyroid Problem  Presents for follow-up visit. Symptoms include depressed mood and fatigue. The symptoms have been stable. Her past medical history is significant for " hyperlipidemia.   Depression  Visit Type: follow-up  Patient presents with the following symptoms: decreased concentration, depressed mood, excessive worry, feelings of hopelessness, malaise, muscle tension and restlessness.  Patient is not experiencing: compulsions, confusion, hyperventilation, obsessions, panic, suicidal ideas, suicidal planning and thoughts of death.  Frequency of symptoms: constantly   Severity: severe   Sleep quality: fair  Nighttime awakenings: several    Heartburn  She complains of belching, globus sensation, heartburn and water brash. She reports no abdominal pain. This is a chronic problem. The current episode started more than 1 year ago. The problem occurs occasionally. The problem has been unchanged. The heartburn does not wake her from sleep. The heartburn does not limit her activity. The heartburn doesn't change with position. The symptoms are aggravated by certain foods and stress. Associated symptoms include fatigue. Risk factors include caffeine use. She has tried a PPI for the symptoms. The treatment provided significant relief.   Hyperlipidemia  This is a chronic problem. The current episode started more than 1 year ago. The problem is controlled. Factors aggravating her hyperlipidemia include fatty foods. Associated symptoms include leg pain. Current antihyperlipidemic treatment includes diet change. The current treatment provides moderate improvement of lipids. There are no compliance problems.  Risk factors for coronary artery disease include dyslipidemia and stress.   Fatigue  This is a chronic problem. The current episode started more than 1 month ago. The problem occurs constantly. The problem has been gradually worsening. Associated symptoms include fatigue. Pertinent negatives include no abdominal pain. The symptoms are aggravated by stress and exertion. She has tried rest for the symptoms. The treatment provided mild relief.     Current Outpatient Medications on File  Prior to Visit   Medication Sig Dispense Refill    Ascorbic Acid (Vitamin C) 500 MG capsule Take 1 each by mouth Daily.      aspirin 325 MG tablet Take 1 tablet by mouth 2 (two) times a day.      Calcium Carbonate 1500 (600 Ca) MG tablet Take 1 tablet by mouth 2 (Two) Times a Day With Meals. 180 tablet 2    cholecalciferol (VITAMIN D3) 25 MCG (1000 UT) tablet Take 1 tablet by mouth Daily.      digoxin (LANOXIN) 125 MCG tablet Take 1 tablet by mouth Daily.      docusate sodium (COLACE) 100 MG capsule Take 2 capsules by mouth 2 (Two) Times a Day.      escitalopram (LEXAPRO) 10 MG tablet Take 1 tablet by mouth Daily. 90 tablet 1    fluticasone (FLONASE) 50 MCG/ACT nasal spray 2 sprays into the nostril(s) as directed by provider Daily. Administer 2 sprays in each nostril for each dose. 16 g 2    HYDROcodone-acetaminophen (NORCO) 5-325 MG per tablet Take 1 tablet by mouth.      levothyroxine (SYNTHROID, LEVOTHROID) 25 MCG tablet Take 1 tablet by mouth Daily. 90 tablet 1    LORazepam (ATIVAN) 0.5 MG tablet Take 1 tablet by mouth Daily As Needed for Anxiety. 30 tablet 0    magnesium oxide (MAGOX) 400 (241.3 Mg) MG tablet tablet Take 2 tablets by mouth Daily.      meloxicam (MOBIC) 15 MG tablet Take 1 tablet by mouth Daily. 60 tablet 1    Multiple Vitamins-Minerals (CENTRUM VITAMINTS PO) Take 1 tablet by mouth Daily.      naloxone (NARCAN) 4 MG/0.1ML nasal spray 1 spray into the nostril(s) as directed by provider As Needed (opiod overdose). 1 each 1    Omega-3 Fatty Acids (FISH OIL CONCENTRATE) 1000 MG capsule Take 1 capsule by mouth 2 (two) times a day.      omeprazole (priLOSEC) 40 MG capsule Take 1 capsule by mouth Daily. 90 capsule 3    polyethylene glycol (MIRALAX) 17 GM/SCOOP powder Take 17 g by mouth Daily. 765 g 5    tiZANidine (ZANAFLEX) 4 MG tablet Take 1 tablet by mouth Every 6 (Six) Hours As Needed for Muscle Spasms. 360 tablet 2    vitamin E 400 UNIT capsule Take 1 capsule by mouth Daily.      [DISCONTINUED]  "brompheniramine-pseudoephedrine-DM 30-2-10 MG/5ML syrup Take 5 mL by mouth 4 (Four) Times a Day As Needed for Congestion, Cough or Allergies. 120 mL 0    [DISCONTINUED] coenzyme Q10 100 MG capsule Take 2 capsules by mouth Daily.      [DISCONTINUED] cyanocobalamin (VITAMIN B-12) 500 MCG tablet Take 1 tablet by mouth Daily.      [DISCONTINUED] HYDROcodone-acetaminophen (NORCO) 5-325 MG per tablet Take 1 tablet by mouth.       Current Facility-Administered Medications on File Prior to Visit   Medication Dose Route Frequency Provider Last Rate Last Admin    [DISCONTINUED] cyanocobalamin injection 1,000 mcg  1,000 mcg Intramuscular Q28 Days Marti Richardson, YOANA   1,000 mcg at 02/24/23 0850     Allergies   Allergen Reactions    Sulfa Antibiotics Arrhythmia    Kenalog [Triamcinolone Acetonide] Palpitations    Medrol [Methylprednisolone] Palpitations    Prednisone Palpitations       Objective   Vital Signs:  /72   Pulse 75   Ht 162.6 cm (64\")   Wt 77.5 kg (170 lb 14.4 oz)   SpO2 98%   BMI 29.33 kg/mý   Estimated body mass index is 29.33 kg/mý as calculated from the following:    Height as of this encounter: 162.6 cm (64\").    Weight as of this encounter: 77.5 kg (170 lb 14.4 oz).         Physical Exam  Vitals and nursing note reviewed.   Constitutional:       Appearance: Normal appearance. She is well-developed.   HENT:      Head: Normocephalic and atraumatic.        Right Ear: Tympanic membrane, ear canal and external ear normal.      Left Ear: Tympanic membrane, ear canal and external ear normal.      Nose: Nose normal.      Mouth/Throat:      Mouth: Mucous membranes are moist.      Pharynx: Oropharynx is clear.   Eyes:      Extraocular Movements: Extraocular movements intact.      Conjunctiva/sclera: Conjunctivae normal.      Pupils: Pupils are equal, round, and reactive to light.   Cardiovascular:      Rate and Rhythm: Normal rate and regular rhythm.      Pulses: Normal pulses.      Heart sounds: Normal heart " sounds.   Pulmonary:      Effort: Pulmonary effort is normal.      Breath sounds: Normal breath sounds.   Chest:      Comments: Radiology will call to schedule bilateral mammogram  Encouraged monthly self breast exams at home        Abdominal:      General: Bowel sounds are normal.      Palpations: Abdomen is soft.   Musculoskeletal:         General: Normal range of motion.      Cervical back: Normal range of motion and neck supple.   Skin:     General: Skin is warm.      Capillary Refill: Capillary refill takes less than 2 seconds.   Neurological:      Mental Status: She is alert and oriented to person, place, and time.   Psychiatric:         Behavior: Behavior normal.      Result Review :                   Assessment and Plan   Diagnoses and all orders for this visit:    1. Annual physical exam (Primary)    2. Hypercholesteremia  -     Lipid panel; Future    3. Postablative hypothyroidism  -     TSH; Future  -     T4, free; Future    4. Generalized anxiety disorder    5. Chronic midline low back pain without sciatica    6. Gastroesophageal reflux disease without esophagitis  -     CBC & Differential; Future  -     Comprehensive Metabolic Panel; Future    7. Chronic fatigue  -     Vitamin D,25-Hydroxy; Future  -     Vitamin B12; Future  -     cyanocobalamin injection 1,000 mcg    8. Skin lesion  -     Ambulatory Referral to Dermatology    9. Screening mammogram for breast cancer  -     Mammo Screening Digital Tomosynthesis Bilateral With CAD; Future      1.  Annual physical exam:  Continue on current medications as previously prescribed   Counseling on importance of heathy eating habits and regular physical activity regimen on improving overall physical and mental health.     2.  Hypercholesterolemia:  Complete fasting lipid panel as ordered and will notify of results when available  Continue fish oil as previously prescribed  Continue to adhere to a low-fat diet    3.  Postablative hypothyroidism:  Complete TSH  and free T4 as ordered and will notify of results when available  Continue levothyroxine as previously prescribed    4.  Generalized anxiety disorder:  Continue Lexapro as previously prescribed  Continue alprazolam as previously prescribed    5.  Chronic midline low back pain without sciatica:  Continue Norco as previously prescribed by pain management    6.  Gastroesophageal reflux disease without esophagitis:  Complete ABC chemistry panel as ordered and will notify of results when available  Continue omeprazole as previously prescribed    7.  Chronic fatigue:  Complete vitamin D and vitamin B12 level as ordered and will notify of results when available  Vitamin B12 1000 mcg given IM in office    8.  Skin lesion:  Referral placed to dermatology will contact her to schedule appointment    9.  Screening mammogram for breast cancer:  Radiology will call to schedule bilateral mammogram  Encouraged monthly self breast exams at home         Follow Up   Return in about 6 months (around 2/28/2024) for Recheck.  Patient was given instructions and counseling regarding her condition or for health maintenance advice. Please see specific information pulled into the AVS if appropriate.         This document has been electronically signed by YOANA Arguello on August 28, 2023 08:21 CDT

## 2023-09-12 ENCOUNTER — LAB (OUTPATIENT)
Dept: LAB | Facility: HOSPITAL | Age: 58
End: 2023-09-12
Payer: COMMERCIAL

## 2023-09-12 DIAGNOSIS — E89.0 POSTABLATIVE HYPOTHYROIDISM: ICD-10-CM

## 2023-09-12 DIAGNOSIS — K21.9 GASTROESOPHAGEAL REFLUX DISEASE WITHOUT ESOPHAGITIS: ICD-10-CM

## 2023-09-12 DIAGNOSIS — E78.00 HYPERCHOLESTEREMIA: ICD-10-CM

## 2023-09-12 DIAGNOSIS — R53.82 CHRONIC FATIGUE: ICD-10-CM

## 2023-09-12 LAB
25(OH)D3 SERPL-MCNC: 32 NG/ML (ref 30–100)
ALBUMIN SERPL-MCNC: 4.3 G/DL (ref 3.5–5.2)
ALBUMIN/GLOB SERPL: 1.4 G/DL
ALP SERPL-CCNC: 69 U/L (ref 39–117)
ALT SERPL W P-5'-P-CCNC: 15 U/L (ref 1–33)
ANION GAP SERPL CALCULATED.3IONS-SCNC: 10 MMOL/L (ref 5–15)
AST SERPL-CCNC: 18 U/L (ref 1–32)
BASOPHILS # BLD AUTO: 0.06 10*3/MM3 (ref 0–0.2)
BASOPHILS NFR BLD AUTO: 1.2 % (ref 0–1.5)
BILIRUB SERPL-MCNC: 0.3 MG/DL (ref 0–1.2)
BUN SERPL-MCNC: 13 MG/DL (ref 6–20)
BUN/CREAT SERPL: 17.6 (ref 7–25)
CALCIUM SPEC-SCNC: 9.6 MG/DL (ref 8.6–10.5)
CHLORIDE SERPL-SCNC: 104 MMOL/L (ref 98–107)
CHOLEST SERPL-MCNC: 209 MG/DL (ref 0–200)
CO2 SERPL-SCNC: 26 MMOL/L (ref 22–29)
CREAT SERPL-MCNC: 0.74 MG/DL (ref 0.57–1)
DEPRECATED RDW RBC AUTO: 43.5 FL (ref 37–54)
EGFRCR SERPLBLD CKD-EPI 2021: 95.1 ML/MIN/1.73
EOSINOPHIL # BLD AUTO: 0.22 10*3/MM3 (ref 0–0.4)
EOSINOPHIL NFR BLD AUTO: 4.4 % (ref 0.3–6.2)
ERYTHROCYTE [DISTWIDTH] IN BLOOD BY AUTOMATED COUNT: 12.9 % (ref 12.3–15.4)
GLOBULIN UR ELPH-MCNC: 3.1 GM/DL
GLUCOSE SERPL-MCNC: 88 MG/DL (ref 65–99)
HCT VFR BLD AUTO: 40.6 % (ref 34–46.6)
HDLC SERPL-MCNC: 71 MG/DL (ref 40–60)
HGB BLD-MCNC: 13.7 G/DL (ref 12–15.9)
IMM GRANULOCYTES # BLD AUTO: 0.01 10*3/MM3 (ref 0–0.05)
IMM GRANULOCYTES NFR BLD AUTO: 0.2 % (ref 0–0.5)
LDLC SERPL CALC-MCNC: 123 MG/DL (ref 0–100)
LDLC/HDLC SERPL: 1.71 {RATIO}
LYMPHOCYTES # BLD AUTO: 1.74 10*3/MM3 (ref 0.7–3.1)
LYMPHOCYTES NFR BLD AUTO: 35.2 % (ref 19.6–45.3)
MCH RBC QN AUTO: 30.9 PG (ref 26.6–33)
MCHC RBC AUTO-ENTMCNC: 33.7 G/DL (ref 31.5–35.7)
MCV RBC AUTO: 91.6 FL (ref 79–97)
MONOCYTES # BLD AUTO: 0.35 10*3/MM3 (ref 0.1–0.9)
MONOCYTES NFR BLD AUTO: 7.1 % (ref 5–12)
NEUTROPHILS NFR BLD AUTO: 2.57 10*3/MM3 (ref 1.7–7)
NEUTROPHILS NFR BLD AUTO: 51.9 % (ref 42.7–76)
NRBC BLD AUTO-RTO: 0 /100 WBC (ref 0–0.2)
PLATELET # BLD AUTO: 345 10*3/MM3 (ref 140–450)
PMV BLD AUTO: 10.5 FL (ref 6–12)
POTASSIUM SERPL-SCNC: 5 MMOL/L (ref 3.5–5.2)
PROT SERPL-MCNC: 7.4 G/DL (ref 6–8.5)
RBC # BLD AUTO: 4.43 10*6/MM3 (ref 3.77–5.28)
SODIUM SERPL-SCNC: 140 MMOL/L (ref 136–145)
T4 FREE SERPL-MCNC: 0.89 NG/DL (ref 0.93–1.7)
TRIGL SERPL-MCNC: 83 MG/DL (ref 0–150)
TSH SERPL DL<=0.05 MIU/L-ACNC: 2.05 UIU/ML (ref 0.27–4.2)
VIT B12 BLD-MCNC: 514 PG/ML (ref 211–946)
VLDLC SERPL-MCNC: 15 MG/DL (ref 5–40)
WBC NRBC COR # BLD: 4.95 10*3/MM3 (ref 3.4–10.8)

## 2023-09-12 PROCEDURE — 36415 COLL VENOUS BLD VENIPUNCTURE: CPT

## 2023-09-12 PROCEDURE — 82306 VITAMIN D 25 HYDROXY: CPT

## 2023-09-12 PROCEDURE — 84439 ASSAY OF FREE THYROXINE: CPT

## 2023-09-12 PROCEDURE — 80050 GENERAL HEALTH PANEL: CPT

## 2023-09-12 PROCEDURE — 82607 VITAMIN B-12: CPT

## 2023-09-12 PROCEDURE — 80061 LIPID PANEL: CPT

## 2023-09-19 ENCOUNTER — TELEPHONE (OUTPATIENT)
Dept: FAMILY MEDICINE CLINIC | Facility: CLINIC | Age: 58
End: 2023-09-19
Payer: COMMERCIAL

## 2023-09-19 NOTE — TELEPHONE ENCOUNTER
-Per YOANA Kidd, Ms. Lopez has been called with recent lab results & recommendations.  Continue current medications and follow-up as planned or sooner if any problems.     ---- Message from YOANA Arguello sent at 9/19/2023  7:23 AM CDT -----  Total cholesterol, good cholesterol and bad cholesterol remain slightly elevated.  Continue to adhere to a low-fat diet and continue low impact exercises such as walking.  TSH within normal limits but free T4 remains slightly decreased.  We will repeat thyroid studies at next appointment.  All other labs were essentially normal.

## 2023-09-22 DIAGNOSIS — K21.9 GASTROESOPHAGEAL REFLUX DISEASE WITHOUT ESOPHAGITIS: ICD-10-CM

## 2023-09-22 RX ORDER — OMEPRAZOLE 40 MG/1
40 CAPSULE, DELAYED RELEASE ORAL DAILY
Qty: 90 CAPSULE | Refills: 3 | Status: SHIPPED | OUTPATIENT
Start: 2023-09-22

## 2023-09-22 RX ORDER — TIZANIDINE 4 MG/1
4 TABLET ORAL EVERY 6 HOURS PRN
Qty: 360 TABLET | Refills: 2 | Status: SHIPPED | OUTPATIENT
Start: 2023-09-22

## 2023-12-28 NOTE — THERAPY DISCHARGE NOTE
Outpatient Physical Therapy Ortho /Discharge Summary  [facility]     Patient Name: Nerissa Lopez  : 1965  MRN: [id number]  Today's Date: 2017      Visit Date: 2017      Visit /  D/Cfrom PT w/ HEP and 1 free month at fitness formula  MD 18        Visit Dx:    ICD-10-CM ICD-9-CM   1. Sciatica of left side M54.32 724.3       Patient Active Problem List   Diagnosis   • Hyperthyroidism   • Osteoporosis   • Vitamin D deficiency   • Menopausal syndrome (hot flashes)   • Anal fistula   • Anemia   • Astigmatism   • Death of family member   • Gastroesophageal reflux disease   • Generalized anxiety disorder   • Goiter   • Graves disease   • Hematuria   • Lesion of eyelid   • Lumbosacral spondylolysis   • Menopausal syndrome   • Myopia   • Lumbar back pain   • Panic attack   • Presbyopia   • Takotsubo cardiomyopathy   • Thoracic spondylosis        Past Medical History:   Diagnosis Date   • Anal fistula    • Anemia    • Astigmatism    • Broken heart syndrome 2016    when mother passed away   • Death of family member     Mother passes away.   • Generalized anxiety disorder    • GERD (gastroesophageal reflux disease)    • Goiter    • Graves disease    • Heartburn    • Hematuria    • Hyperthyroidism    • Lesion of eyelid     LLL   • Lumbosacral spondylosis    • Menopausal syndrome    • Myopia    • Osteoporosis    • Pain in back     Lumbar region   • Pain in joint involving left lower leg    • Pain in wrist    • Panic attack    • Presbyopia    • Right upper quadrant pain    • Takotsubo cardiomyopathy    • Thoracic spondylosis         Past Surgical History:   Procedure Laterality Date   • CARDIAC CATHETERIZATION  2016    Normal coronaries with no obstructive disease. Nonischemic stress induced cardiomyopathy known as Takotsubo syndrome.   • COLONOSCOPY  2016    Removal of anal fistula   • CYST REMOVAL Right     right breast   • EYELID CARCINOMA EXCISION  06/15/2015   •  Principal Discharge DX:	Tinea pedis   HYSTERECTOMY  04/14/2014    Total abdominal hysterectomy, bilateral salpingo-oophorectomy. Menorrhagia and leiomyomatous uterus.   • SKIN BIOPSY  06/29/2014   • TUBAL ABDOMINAL LIGATION     • WOUND CLOSURE  07/20/2014    Layer closure of wound.   • WOUND CLOSURE  01/16/2014    Layer closure of wound                             PT Assessment/Plan       06/20/17 0929       PT Assessment    Assessment Comments tolerated pool very well  -KW     PT Plan    PT Plan Comments will D/C from PT w/ HEP and fitness mebership  -KW       User Key  (r) = Recorded By, (t) = Taken By, (c) = Cosigned By    Initials Name Provider Type    LEILA Alanis, PT Physical Therapist                    Exercises       06/20/17 0900          Subjective Comments    Subjective Comments Report R hip pain. will see MD in 10 days. Pt will D/C from PT  -KW      Subjective Pain    Able to rate subjective pain? yes  -KW      Pre-Treatment Pain Level 5  -KW      Post-Treatment Pain Level 3  -KW      Aquatics    Aquatics performed? Yes  -KW      Aquatics LE    Water Walk forward;side;backward  -KW      Vertical Traction 5 min  -KW      Clams 30x  -KW      Hip Abd/Add 30x  -KW      Hip Flex/Ext 30x  -KW        User Key  (r) = Recorded By, (t) = Taken By, (c) = Cosigned By    Initials Name Provider Type    LEILA Alanis, PT Physical Therapist                               PT OP Goals       06/20/17 0900       PT Short Term Goals    STG Date to Achieve 06/27/17  -KW     STG 1 Tolerate 45 minute treatment session, no increase in pain  -KW     STG 1 Progress Met  -KW     STG 2 Able to stand 40 minutes or greater, no increase in pain  -KW     STG 2 Progress Not Met  -KW     STG 3 Able to sit 40 minutes or greater, no increase in pain  -KW     STG 3 Progress Not Met  -KW     STG 4 Able to walk 0.75 miles or greater, no increase in pain  -KW     STG 4 Progress Not Met  -KW     STG 5 Ambulate 4 reciprocal stairs, no increase in pain.  -KW     STG 5  Progress Not Met  -KW     Long Term Goals    LTG Date to Achieve 07/04/17  -KW     LTG 1 Modified Oswestry to 25% or less.  -KW     LTG 1 Progress Not Met  -KW     LTG 2 Able to stand 55 minutes or greater, no increase in pain  -KW     LTG 2 Progress Not Met  -KW     LTG 3 Able to sit 55 minutes or greater, no increase in pain  -KW     LTG 3 Progress Not Met  -KW     LTG 4 Able to walk 1 mile or greater, no increase in pain  -KW     LTG 4 Progress Not Met  -KW     LTG 5 B hip flexion to 4+/5 or greater  -KW     LTG 5 Progress Not Met  -KW     LTG 6 B knee extension to 4+/5 or greater  -KW     LTG 6 Progress Not Met  -KW     LTG 7 B knee flexion to 4+/5 or greater  -KW     LTG 7 Progress Not Met  -KW     Time Calculation    PT Goal Re-Cert Due Date 07/04/17  -KW       User Key  (r) = Recorded By, (t) = Taken By, (c) = Cosigned By    Initials Name Provider Type    LEILA Alanis, AYAZ Physical Therapist                Therapy Education       06/20/17 0929          Therapy Education    Given HEP  -KW      Program Reinforced  -KW      How Provided Verbal;Demonstration  -KW      Provided to Patient  -KW      Level of Understanding Verbalized;Demonstrated  -KW        User Key  (r) = Recorded By, (t) = Taken By, (c) = Cosigned By    Initials Name Provider Type    LEILA Alanis, AYAZ Physical Therapist                Time Calculation:   Start Time: 0845  Stop Time: 0930  Time Calculation (min): 45 min  Total Timed Code Minutes- PT: 45 minute(s)    Therapy Charges for Today     Code Description Service Date Service Provider Modifiers Qty    [id number] HC PT AQUATIC THERAPY EA 15 MIN 6/20/2017 Yani Alanis, PT GP 3                OP PT Discharge Summary  Date of Discharge: 06/20/17  Reason for Discharge: Patient/Caregiver request  Outcomes Achieved: Patient able to partially acheive established goals  Discharge Instructions: D/C w/ HEP and fitness formula month      Yani Alanis, PT  6/20/2017        1